# Patient Record
Sex: FEMALE | Race: WHITE | NOT HISPANIC OR LATINO | Employment: UNEMPLOYED | URBAN - NONMETROPOLITAN AREA
[De-identification: names, ages, dates, MRNs, and addresses within clinical notes are randomized per-mention and may not be internally consistent; named-entity substitution may affect disease eponyms.]

---

## 2017-06-14 ENCOUNTER — TRANSCRIBE ORDERS (OUTPATIENT)
Dept: CARDIOLOGY | Facility: CLINIC | Age: 42
End: 2017-06-14

## 2017-06-14 DIAGNOSIS — R55 NEAR SYNCOPE: Primary | ICD-10-CM

## 2017-06-15 ENCOUNTER — CONSULT (OUTPATIENT)
Dept: CARDIOLOGY | Facility: CLINIC | Age: 42
End: 2017-06-15

## 2017-06-15 VITALS
HEIGHT: 64 IN | DIASTOLIC BLOOD PRESSURE: 74 MMHG | BODY MASS INDEX: 27.83 KG/M2 | WEIGHT: 163 LBS | HEART RATE: 100 BPM | SYSTOLIC BLOOD PRESSURE: 102 MMHG

## 2017-06-15 DIAGNOSIS — R00.2 PALPITATIONS: ICD-10-CM

## 2017-06-15 DIAGNOSIS — R00.0 TACHYCARDIA: ICD-10-CM

## 2017-06-15 DIAGNOSIS — R55 NEAR SYNCOPE: Primary | ICD-10-CM

## 2017-06-15 DIAGNOSIS — R42 DIZZINESS: ICD-10-CM

## 2017-06-15 DIAGNOSIS — G43.109 MIGRAINE AURA WITHOUT HEADACHE: ICD-10-CM

## 2017-06-15 DIAGNOSIS — R01.1 MURMUR, CARDIAC: ICD-10-CM

## 2017-06-15 PROCEDURE — 93000 ELECTROCARDIOGRAM COMPLETE: CPT | Performed by: INTERNAL MEDICINE

## 2017-06-15 PROCEDURE — 99244 OFF/OP CNSLTJ NEW/EST MOD 40: CPT | Performed by: INTERNAL MEDICINE

## 2017-06-15 RX ORDER — AMOXICILLIN 500 MG/1
500 CAPSULE ORAL 2 TIMES DAILY
COMMUNITY
End: 2017-07-19

## 2017-06-15 RX ORDER — CYCLOBENZAPRINE HCL 10 MG
20 TABLET ORAL DAILY
COMMUNITY

## 2017-06-15 RX ORDER — TOPIRAMATE 50 MG/1
75 TABLET, FILM COATED ORAL 2 TIMES DAILY
COMMUNITY

## 2017-06-15 RX ORDER — BUTALBITAL, ACETAMINOPHEN AND CAFFEINE 50; 325; 40 MG/1; MG/1; MG/1
1 TABLET ORAL EVERY 4 HOURS PRN
COMMUNITY

## 2017-06-15 NOTE — PROGRESS NOTES
CARDIAC COMPLAINTS  Dizziness and palpitations      Subjective   Dolores Dejesus is a 41 y.o. female came in today for her initial cardiac evaluation.  She has history of significant migraine headache for which she has been on medication for a long time and is now taking Botox injections.  She gets it about every 3 months.  About 2 weeks ago, she was driving with her daughter when she suddenly started hearing ringing noise in both the ears, followed by vision changes there as she started having bright light both sides, and she started feeling dizzy lightheaded and felt like almost going to pass out.  She was holding onto the steering wheel, and in the next few minutes the vision changes but getting better and that ringing noise started getting less.  She started feeling nervous and shaky.  She ate some candies and pulled into a gas station.  She drank some pop after which she started feeling little better.  For the next 2 days, she had multiple episodes of dizziness lasting for a few seconds to minutes.  She was seen at your office, lab work was normal.  Echocardiogram was done as well as a Holter monitor.  Other than slight tachycardia, it was unremarkable.  She did talk with the neurologist and is scheduled to have an EEG done in the next couple weeks.  She is also having a lot of stress, with one of her son who is 2015 years old ,was diagnosed with Kawasaki disease when he was 2 months old. He had stents placed when he was 2 years old.  Had stents recently after an MI and also has an ICD placed.    Past Surgical History:   Procedure Laterality Date   • CONVERTED (HISTORICAL) HOLTER  06/01/2017    @[facility].AVG HR 98 BPM   • ECHO - CONVERTED  06/14/2017    @[facility]. EF 60%. Mild AI       Current Outpatient Prescriptions   Medication Sig Dispense Refill   • amoxicillin (AMOXIL) 500 MG capsule Take 500 mg by mouth 2 (Two) Times a Day.     • butalbital-acetaminophen-caffeine (FIORICET) -40 MG per tablet Take 1 tablet by  "mouth Every 4 (Four) Hours As Needed for Headache.     • cyclobenzaprine (FLEXERIL) 10 MG tablet 2 tablets daily     • topiramate (TOPAMAX) 50 MG tablet 2 tablets a day     • metoprolol tartrate (LOPRESSOR) 25 MG tablet Take 1 tablet by mouth 2 (Two) Times a Day. 60 tablet 11     No current facility-administered medications for this visit.            ALLERGIES:  Carbamates and Carisoprodol    Past Medical History:   Diagnosis Date   • Anxiety    • Asthma    • Chronic kidney disease     hx of kidney stones   • Depression    • History of ear surgery    • Hypoglycemia    • Migraines    • S/P ACL surgery    • S/P removal of ovarian cyst        History   Smoking Status   • Never Smoker   Smokeless Tobacco   • Never Used        Review of Systems   Constitution: Positive for malaise/fatigue. Negative for decreased appetite.   HENT: Positive for tinnitus. Negative for congestion and nosebleeds.    Eyes: Negative for blurred vision and visual disturbance.   Cardiovascular: Positive for palpitations. Negative for chest pain.   Respiratory: Negative for cough.    Endocrine: Negative for cold intolerance.   Hematologic/Lymphatic: Negative for adenopathy.   Skin: Negative for nail changes.   Musculoskeletal: Negative for arthritis and joint pain.   Gastrointestinal: Negative for bloating and dysphagia.   Genitourinary: Negative for bladder incontinence and hesitancy.   Neurological: Positive for dizziness. Negative for excessive daytime sleepiness.   Psychiatric/Behavioral: Negative for altered mental status.       Diabetes- No  Thyroid- normal    Objective     /74 (BP Location: Right arm)  Pulse 100  Ht 64\" (162.6 cm)  Wt 163 lb (73.9 kg)  BMI 27.98 kg/m2    Physical Exam   Constitutional: She appears well-developed.   HENT:   Head: Normocephalic.   Eyes: Pupils are equal, round, and reactive to light.   Neck: Normal range of motion.   Cardiovascular: Normal rate.    Murmur heard.  Pulmonary/Chest: Effort normal. "   Abdominal: Soft. Bowel sounds are normal.   Musculoskeletal: Normal range of motion.   Neurological: She is alert.   Skin: Skin is warm.   Psychiatric: She has a normal mood and affect.         ECG 12 Lead  Date/Time: 6/15/2017 2:45 PM  Performed by: TERESA PENNINGTON  Authorized by: TERESA PENNINGTON   Previous ECG: no previous ECG available  Rhythm: sinus tachycardia  Rate: tachycardic  QRS axis: normal  Clinical impression: abnormal ECG              Assessment/Plan   She appears to be tachycardic.  Blood pressure is normal.  EKG shows sinus tachycardia with no significant changes.  Her clinical examination is unremarkable.  Had a long talk with her about the symptoms.  Not sure whether this is an atypical migraine, or any seizure disorders.  I started her on a low dose of beta blockers to control the heart rate, and hopefully helps with the migraine also.  We will put her extended monitor to see any arrhythmias, since she did not have any symptoms during the Holter monitor.  I also scheduled her to repeat the echocardiogram with bubble study to rule out PFO.  Based on the results of these tests, further recommendations will be made.  Dolores was seen today for establish care and dizziness.    Diagnoses and all orders for this visit:    Near syncope  -     Cardiac Event Monitor; Future    Dizziness  -     Cardiac Event Monitor; Future    Palpitations    Migraine aura without headache  -     Adult Transthoracic Echo Complete With Contrast; Future    Murmur, cardiac    Tachycardia  -     metoprolol tartrate (LOPRESSOR) 25 MG tablet; Take 1 tablet by mouth 2 (Two) Times a Day.  -     Adult Transthoracic Echo Complete With Contrast; Future                    Electronically signed by Teresa Pennington MD Minerva 15, 2017 2:39 PM

## 2017-06-22 ENCOUNTER — OUTSIDE FACILITY SERVICE (OUTPATIENT)
Dept: CARDIOLOGY | Facility: CLINIC | Age: 42
End: 2017-06-22

## 2017-06-22 ENCOUNTER — HOSPITAL ENCOUNTER (OUTPATIENT)
Dept: CARDIOLOGY | Facility: HOSPITAL | Age: 42
Discharge: HOME | End: 2017-06-22
Attending: INTERNAL MEDICINE | Admitting: INTERNAL MEDICINE

## 2017-06-22 DIAGNOSIS — G43.109 MIGRAINE AURA WITHOUT HEADACHE: ICD-10-CM

## 2017-06-22 DIAGNOSIS — R00.0 TACHYCARDIA: ICD-10-CM

## 2017-06-22 PROCEDURE — C8929 TTE W OR WO FOL WCON,DOPPLER: HCPCS

## 2017-06-22 PROCEDURE — 93306 TTE W/DOPPLER COMPLETE: CPT | Performed by: INTERNAL MEDICINE

## 2017-06-22 RX ORDER — SODIUM CHLORIDE 9 MG/ML
8 INJECTION INTRAMUSCULAR; INTRAVENOUS; SUBCUTANEOUS AS NEEDED
Status: DISCONTINUED | OUTPATIENT
Start: 2017-06-22 | End: 2017-06-23 | Stop reason: HOSPADM

## 2017-06-22 RX ADMIN — SODIUM CHLORIDE 8 ML: 9 INJECTION, SOLUTION INTRAMUSCULAR; INTRAVENOUS; SUBCUTANEOUS at 12:04

## 2017-06-30 ENCOUNTER — TELEPHONE (OUTPATIENT)
Dept: CARDIOLOGY | Facility: CLINIC | Age: 42
End: 2017-06-30

## 2017-06-30 DIAGNOSIS — R55 SYNCOPE, UNSPECIFIED SYNCOPE TYPE: Primary | ICD-10-CM

## 2017-07-10 ENCOUNTER — OUTSIDE FACILITY SERVICE (OUTPATIENT)
Dept: CARDIOLOGY | Facility: CLINIC | Age: 42
End: 2017-07-10

## 2017-07-10 PROCEDURE — 93228 REMOTE 30 DAY ECG REV/REPORT: CPT | Performed by: INTERNAL MEDICINE

## 2017-07-19 ENCOUNTER — OFFICE VISIT (OUTPATIENT)
Dept: CARDIOLOGY | Facility: CLINIC | Age: 42
End: 2017-07-19

## 2017-07-19 VITALS
WEIGHT: 162 LBS | SYSTOLIC BLOOD PRESSURE: 110 MMHG | DIASTOLIC BLOOD PRESSURE: 64 MMHG | HEIGHT: 64 IN | HEART RATE: 88 BPM | BODY MASS INDEX: 27.66 KG/M2

## 2017-07-19 DIAGNOSIS — R00.0 TACHYCARDIA: ICD-10-CM

## 2017-07-19 DIAGNOSIS — R55 NEAR SYNCOPE: Primary | ICD-10-CM

## 2017-07-19 DIAGNOSIS — R42 DIZZINESS: ICD-10-CM

## 2017-07-19 DIAGNOSIS — G43.109 MIGRAINE AURA WITHOUT HEADACHE: ICD-10-CM

## 2017-07-19 DIAGNOSIS — E16.2 HYPOGLYCEMIA: ICD-10-CM

## 2017-07-19 PROCEDURE — 99213 OFFICE O/P EST LOW 20 MIN: CPT | Performed by: INTERNAL MEDICINE

## 2017-07-19 RX ORDER — FLUDROCORTISONE ACETATE 0.1 MG/1
0.1 TABLET ORAL DAILY
Qty: 30 TABLET | Refills: 8 | Status: SHIPPED | OUTPATIENT
Start: 2017-07-19 | End: 2018-01-23 | Stop reason: SDUPTHER

## 2017-07-19 NOTE — PROGRESS NOTES
Chief Complaint   Patient presents with   • Follow-up      Still having near syncopal episodes, 1-2 since last here. Pt contributes to her blood sugar dropping.    • Med Refill     No refills needed.        CARDIAC COMPLAINTS  Dizziness and syncope        Subjective   Dolores Dejesus is a 41 y.o. female him in today for her follow-up visit.  She was referred to me for syncopal episodes.  She was found to be little tachycardic.  Low-dose of beta blockers were started.  Her echocardiogram with bubble study showed no evidence of shunt.  Her extended monitor did not show any major arrhythmia.  She did have near syncopal episode while waiting the monitor, but the monitor did not show any arrhythmia during that time.  She was scheduled to undergo a tilt table.  Apparently it was done early in the morning and she is not able to keep the appointment.  She came today stating she had about 2 episode of near syncopal episode but did not pass out.  Still has some dizzy spell.  She thinks it could be due to low sugar.  She doesn't have any diaphoresis prior to that.              Cardiac History  Past Surgical History:   Procedure Laterality Date   • CONVERTED (HISTORICAL) HOLTER  06/01/2017    @[facility].AVG HR 98 BPM   • ECHO - CONVERTED  06/14/2017    @[facility]. EF 60%. Mild AI   • ECHO - CONVERTED  06/22/2017    EF 60%. MIld- Mod AI. No Shunt   • OTHER SURGICAL HISTORY  07/10/2017    Extended Monitor- AVG HR 81 BPM. [place] an Syncope with no EKG changes       Current Outpatient Prescriptions   Medication Sig Dispense Refill   • butalbital-acetaminophen-caffeine (FIORICET) -40 MG per tablet Take 1 tablet by mouth Every 4 (Four) Hours As Needed for Headache.     • cyclobenzaprine (FLEXERIL) 10 MG tablet 2 tablets daily     • metoprolol tartrate (LOPRESSOR) 25 MG tablet Take 1 tablet by mouth 2 (Two) Times a Day. 60 tablet 11   • topiramate (TOPAMAX) 50 MG tablet 2 tablets a day     • fludrocortisone 0.1 MG tablet Take 1 tablet by mouth  Daily. 30 tablet 8     No current facility-administered medications for this visit.        Allergies  :  Carbamates and Carisoprodol       Past Medical History:   Diagnosis Date   • Anxiety    • Asthma    • Chronic kidney disease     hx of kidney stones   • Depression    • History of ear surgery    • Hypoglycemia    • Migraines    • S/P ACL surgery    • S/P removal of ovarian cyst        Social History     Social History   • Marital status:      Spouse name: N/A   • Number of children: N/A   • Years of education: N/A     Occupational History   • Not on file.     Social History Main Topics   • Smoking status: Never Smoker   • Smokeless tobacco: Never Used   • Alcohol use No   • Drug use: No   • Sexual activity: Not on file     Other Topics Concern   • Not on file     Social History Narrative       Family History   Problem Relation Age of Onset   • Diabetes Mother    • Arthritis Father    • Breast cancer Sister    • No Known Problems Sister    • No Known Problems Sister    • Heart disease Son    • Heart attack Son    • Autism Son        Review of Systems   Constitution: Negative for decreased appetite and malaise/fatigue.   HENT: Negative for congestion and sore throat.    Eyes: Negative for blurred vision.   Cardiovascular: Positive for near-syncope. Negative for chest pain and dyspnea on exertion.   Respiratory: Negative for shortness of breath and snoring.    Endocrine: Negative for cold intolerance and heat intolerance.   Hematologic/Lymphatic: Negative for adenopathy. Does not bruise/bleed easily.   Skin: Negative for itching, nail changes and skin cancer.   Musculoskeletal: Negative for arthritis and myalgias.   Gastrointestinal: Negative for abdominal pain, dysphagia and heartburn.   Genitourinary: Negative for bladder incontinence and frequency.   Neurological: Positive for dizziness. Negative for light-headedness, seizures and vertigo.   Psychiatric/Behavioral: Negative for altered mental status.  "  Allergic/Immunologic: Negative for environmental allergies and hives.       Diabetes- No  Thyroid- normal    Objective     /64  Pulse 88  Ht 64\" (162.6 cm)  Wt 162 lb (73.5 kg)  BMI 27.81 kg/m2    Physical Exam   Constitutional: She is oriented to person, place, and time. She appears well-nourished.   HENT:   Head: Normocephalic.   Eyes: Pupils are equal, round, and reactive to light.   Neck: Normal range of motion.   Cardiovascular: Normal rate, regular rhythm, S1 normal and S2 normal.    Murmur heard.  Pulmonary/Chest: Breath sounds normal.   Abdominal: Soft. Bowel sounds are normal.   Musculoskeletal: Normal range of motion.   Neurological: She is alert and oriented to person, place, and time.   Skin: Skin is warm.   Psychiatric: She has a normal mood and affect.     Procedures            Assessment/Plan     Dolores was seen today for follow-up and med refill.    Diagnoses and all orders for this visit:    Near syncope  -     fludrocortisone 0.1 MG tablet; Take 1 tablet by mouth Daily.  -     Comprehensive Metabolic Panel; Future    Tachycardia  -     TSH; Future    Dizziness  -     fludrocortisone 0.1 MG tablet; Take 1 tablet by mouth Daily.  -     CBC & Differential; Future    Migraine aura without headache    Hypoglycemia  -     Hemoglobin A1c; Future     Her heart rate is much better, blood pressure is stable.  I'm going to try to see whether he can do the tilt table at a later time.  Since the symptoms seems to be more often neurocardiac syncope, rather than hypoglycemia, I talked to her about trying Florinef and see whether the symptoms gets better.  If her blood pressure goes up, we can try to increase the dose of the beta blockers.  Also advised her to have lab work to check her hemoglobin, blood sugar, A1c level.                    Electronically signed by Teresa Pennington MD July 19, 2017 4:28 PM      "

## 2017-07-24 DIAGNOSIS — R00.0 TACHYCARDIA: ICD-10-CM

## 2018-01-23 ENCOUNTER — OFFICE VISIT (OUTPATIENT)
Dept: CARDIOLOGY | Facility: CLINIC | Age: 43
End: 2018-01-23

## 2018-01-23 VITALS
HEART RATE: 80 BPM | BODY MASS INDEX: 27.66 KG/M2 | DIASTOLIC BLOOD PRESSURE: 70 MMHG | SYSTOLIC BLOOD PRESSURE: 100 MMHG | HEIGHT: 64 IN | WEIGHT: 162 LBS

## 2018-01-23 DIAGNOSIS — R00.2 PALPITATIONS: ICD-10-CM

## 2018-01-23 DIAGNOSIS — R55 NEAR SYNCOPE: Primary | ICD-10-CM

## 2018-01-23 DIAGNOSIS — R42 DIZZINESS: ICD-10-CM

## 2018-01-23 DIAGNOSIS — Z79.899 MEDICATION MANAGEMENT: ICD-10-CM

## 2018-01-23 DIAGNOSIS — Z86.69 HX OF MIGRAINE HEADACHES: ICD-10-CM

## 2018-01-23 DIAGNOSIS — R00.0 TACHYCARDIA: ICD-10-CM

## 2018-01-23 PROCEDURE — 99213 OFFICE O/P EST LOW 20 MIN: CPT | Performed by: NURSE PRACTITIONER

## 2018-01-23 RX ORDER — FLUDROCORTISONE ACETATE 0.1 MG/1
0.1 TABLET ORAL DAILY
Qty: 90 TABLET | Refills: 3 | Status: SHIPPED | OUTPATIENT
Start: 2018-01-23 | End: 2018-08-13 | Stop reason: SDUPTHER

## 2018-01-23 RX ORDER — SERTRALINE HYDROCHLORIDE 100 MG/1
100 TABLET, FILM COATED ORAL 2 TIMES DAILY
COMMUNITY

## 2018-01-23 RX ORDER — ZOLPIDEM TARTRATE 12.5 MG/1
12.5 TABLET, FILM COATED, EXTENDED RELEASE ORAL NIGHTLY PRN
COMMUNITY
End: 2020-04-24

## 2018-01-23 NOTE — PROGRESS NOTES
"Chief Complaint   Patient presents with   • Follow-up     cardiac management,  no recent labs,    • Med Refill     request 90 day refill's on fludrocortisone. patient brought medication's with visit.    • Palpitations     at times       Subjective       Dolores Dejesus is a 42 y.o. female with a history of syncopal episodes.  She was found to be little tachycardic.  Low-dose of beta blockers were started.  Her echocardiogram with bubble study showed no evidence of shunt.  Her extended monitor did not show any major arrhythmia.  She did have near syncopal episode while wearing the cardiac monitor, but the monitor did not show any arrhythmia during that time.  She was scheduled to undergo a tilt table.  Apparently it was done early in the morning and she was not able to keep the appointment.   Today she comes to the office for a follow up visit. Since her symptoms have improved she did not feel the tilt table test needed to be rescheduled. Overall, she continues to do well and denies reoccurrence of syncope. She maintains active lifestyle. No new medication changes reported. She continues to have \"migraine headaches\", same as prior.     HPI: as noted above         Cardiac History:    Past Surgical History:   Procedure Laterality Date   • CONVERTED (HISTORICAL) HOLTER  06/01/2017    @[facility].AVG HR 98 BPM   • ECHO - CONVERTED  06/14/2017    @[facility]. EF 60%. Mild AI   • ECHO - CONVERTED  06/22/2017    EF 60%. MIld- Mod AI. No Shunt   • OTHER SURGICAL HISTORY  07/10/2017    Extended Monitor- AVG HR 81 BPM. [place] an Syncope with no EKG changes       Current Outpatient Prescriptions   Medication Sig Dispense Refill   • butalbital-acetaminophen-caffeine (FIORICET) -40 MG per tablet Take 1 tablet by mouth Every 4 (Four) Hours As Needed for Headache.     • cyclobenzaprine (FLEXERIL) 10 MG tablet 2 tablets daily     • fludrocortisone 0.1 MG tablet Take 1 tablet by mouth Daily. 90 tablet 3   • metoprolol tartrate (LOPRESSOR) 25 MG " tablet Take 1 tablet by mouth 2 (Two) Times a Day. 180 tablet 4   • sertraline (ZOLOFT) 100 MG tablet Takes 1 and 1/2 tablet's by mouth daily.     • topiramate (TOPAMAX) 50 MG tablet 2 tablets a day     • zolpidem CR (AMBIEN CR) 12.5 MG CR tablet Take 12.5 mg by mouth At Night As Needed for Sleep.       No current facility-administered medications for this visit.        Carbamates and Carisoprodol    Past Medical History:   Diagnosis Date   • Anxiety    • Asthma    • Chronic kidney disease     hx of kidney stones   • Depression    • History of ear surgery    • Hypoglycemia    • Migraines    • S/P ACL surgery    • S/P removal of ovarian cyst        Social History     Social History   • Marital status:      Spouse name: N/A   • Number of children: N/A   • Years of education: N/A     Occupational History   • Not on file.     Social History Main Topics   • Smoking status: Never Smoker   • Smokeless tobacco: Never Used   • Alcohol use No   • Drug use: No   • Sexual activity: Not on file     Other Topics Concern   • Not on file     Social History Narrative       Family History   Problem Relation Age of Onset   • Diabetes Mother    • Arthritis Father    • Breast cancer Sister    • No Known Problems Sister    • No Known Problems Sister    • Heart disease Son    • Heart attack Son    • Autism Son        Review of Systems   Constitution: Negative for decreased appetite, weakness and malaise/fatigue.   HENT: Negative for congestion, hearing loss and hoarse voice.    Eyes: Positive for photophobia (associated with headaches). Negative for visual disturbance.   Cardiovascular: Positive for palpitations (brief, infrequent). Negative for chest pain, dyspnea on exertion, irregular heartbeat, leg swelling and near-syncope.   Respiratory: Negative for shortness of breath and sleep disturbances due to breathing.    Skin: Negative for itching and rash.   Musculoskeletal: Negative for muscle cramps and myalgias.  "  Gastrointestinal: Negative for change in bowel habit, melena and nausea.   Genitourinary: Negative for dysuria and hematuria.   Neurological: Positive for headaches and light-headedness. Negative for tremors.   Psychiatric/Behavioral: Negative for altered mental status. The patient is nervous/anxious. The patient does not have insomnia.         Diabetes- No  Thyroid-normal    Objective     /70 (BP Location: Left arm)  Pulse 80  Ht 162.6 cm (64\")  Wt 73.5 kg (162 lb)  BMI 27.81 kg/m2    Physical Exam   Constitutional: She is oriented to person, place, and time. Vital signs are normal. She appears well-developed and well-nourished. She does not appear ill. No distress.   HENT:   Head: Normocephalic.   Eyes: Conjunctivae are normal. Pupils are equal, round, and reactive to light.   Neck: Normal range of motion. Neck supple. Carotid bruit is not present.   Cardiovascular: Normal rate, regular rhythm, S1 normal and S2 normal.    No murmur heard.  Pulmonary/Chest: Breath sounds normal. She has no wheezes. She has no rales.   Abdominal: Soft. Bowel sounds are normal. There is no tenderness.   Musculoskeletal: Normal range of motion. She exhibits no edema.   Neurological: She is alert and oriented to person, place, and time.   Skin: Skin is warm and dry. No pallor.   Psychiatric: She has a normal mood and affect. Her behavior is normal.      Procedures: none today          Assessment/Plan      Dolores was seen today for follow-up, med refill and palpitations.    Diagnoses and all orders for this visit:    Near syncope  -     fludrocortisone 0.1 MG tablet; Take 1 tablet by mouth Daily.    Tachycardia    Dizziness  -     fludrocortisone 0.1 MG tablet; Take 1 tablet by mouth Daily.    Hx of migraine headaches    Medication management    Palpitations    Headaches, she follows with neurology and manages with medication and rest. Her vital signs today are normal. She denies syncopal episode since last visit. Palpitations " are insignificant. No further cardiac eval recommended at this time. Refills given to continue fludrocortisone. Advised to continue current medications. She avoids caffeine and reports good dietary habits. I complimented healthy choices. We will see her back in 6 months unless symptoms or problems develop sooner. A tilt table test will be considered.               Electronically signed by GIDEON Vallejo,  January 26, 2018 2:03 PM

## 2018-01-26 ENCOUNTER — APPOINTMENT (OUTPATIENT)
Dept: WOMENS IMAGING | Facility: HOSPITAL | Age: 43
End: 2018-01-26

## 2018-01-26 PROBLEM — Z86.69 HX OF MIGRAINE HEADACHES: Status: ACTIVE | Noted: 2018-01-26

## 2018-01-26 PROCEDURE — 77063 BREAST TOMOSYNTHESIS BI: CPT | Performed by: RADIOLOGY

## 2018-01-26 PROCEDURE — 77067 SCR MAMMO BI INCL CAD: CPT | Performed by: RADIOLOGY

## 2018-08-13 ENCOUNTER — OFFICE VISIT (OUTPATIENT)
Dept: CARDIOLOGY | Facility: CLINIC | Age: 43
End: 2018-08-13

## 2018-08-13 VITALS
HEART RATE: 88 BPM | SYSTOLIC BLOOD PRESSURE: 98 MMHG | HEIGHT: 64 IN | BODY MASS INDEX: 28.17 KG/M2 | DIASTOLIC BLOOD PRESSURE: 70 MMHG | WEIGHT: 165 LBS

## 2018-08-13 DIAGNOSIS — R00.0 TACHYCARDIA: ICD-10-CM

## 2018-08-13 DIAGNOSIS — R42 DIZZINESS: ICD-10-CM

## 2018-08-13 DIAGNOSIS — I35.1 NONRHEUMATIC AORTIC VALVE INSUFFICIENCY: ICD-10-CM

## 2018-08-13 DIAGNOSIS — R07.89 CHEST TIGHTNESS: Primary | ICD-10-CM

## 2018-08-13 DIAGNOSIS — R55 NEAR SYNCOPE: ICD-10-CM

## 2018-08-13 PROCEDURE — 99214 OFFICE O/P EST MOD 30 MIN: CPT | Performed by: NURSE PRACTITIONER

## 2018-08-13 RX ORDER — FLUDROCORTISONE ACETATE 0.1 MG/1
0.1 TABLET ORAL DAILY
Qty: 90 TABLET | Refills: 4 | Status: SHIPPED | OUTPATIENT
Start: 2018-08-13 | End: 2018-10-11 | Stop reason: SDUPTHER

## 2018-08-13 NOTE — PATIENT INSTRUCTIONS
Palpitations  A palpitation is the feeling that your heartbeat is irregular or is faster than normal. It may feel like your heart is fluttering or skipping a beat. Palpitations are usually not a serious problem. They may be caused by many things, including smoking, caffeine, alcohol, stress, and certain medicines. Although most causes of palpitations are not serious, palpitations can be a sign of a serious medical problem. In some cases, you may need further medical evaluation.  Follow these instructions at home:  Pay attention to any changes in your symptoms. Take these actions to help with your condition:  · Avoid the following:  ? Caffeinated coffee, tea, soft drinks, diet pills, and energy drinks.  ? Chocolate.  ? Alcohol.  · Do not use any tobacco products, such as cigarettes, chewing tobacco, and e-cigarettes. If you need help quitting, ask your health care provider.  · Try to reduce your stress and anxiety. Things that can help you relax include:  ? Yoga.  ? Meditation.  ? Physical activity, such as swimming, jogging, or walking.  ? Biofeedback. This is a method that helps you learn to use your mind to control things in your body, such as your heartbeats.  · Get plenty of rest and sleep.  · Take over-the-counter and prescription medicines only as told by your health care provider.  · Keep all follow-up visits as told by your health care provider. This is important.    Contact a health care provider if:  · You continue to have a fast or irregular heartbeat after 24 hours.  · Your palpitations occur more often.  Get help right away if:  · You have chest pain or shortness of breath.  · You have a severe headache.  · You feel dizzy or you faint.  This information is not intended to replace advice given to you by your health care provider. Make sure you discuss any questions you have with your health care provider.  Document Released: 12/15/2001 Document Revised: 05/22/2017 Document Reviewed: 09/01/2016  [employer]  [employer] Patient Education © 2018 [employer]

## 2018-08-13 NOTE — PROGRESS NOTES
"Chief Complaint   Patient presents with   • Follow-up     Cardiac management. No recent labs. Patient does not take aspirin.   • Chest Pain     She reports having tightness to mid chest, feels like she can not get breath and will start sweating. She reports having several episodes since last visit.   • Dizziness     She reports has dizziness with episodes of tightness in chest. She reports \"body feels hot\" prior to lightheaded/dizziness.   • Med Refill     Needs refills on cardiac medication-90 day.       Subjective       Dolores Dejesus is a 42 y.o. female with a history of syncopal episodes.  She was found to be little tachycardic.  Low-dose of beta blockers were started.  Her echocardiogram with bubble study showed no evidence of shunt.  Her extended monitor did not show any major arrhythmia.  She did have near syncopal episode while wearing the cardiac monitor, but the monitor did not show any arrhythmia during that time.  She was scheduled to undergo a tilt table.  Apparently it was done early in the morning and she was not able to keep the appointment.  Today she comes to the office for a follow up visit. She underwent hysterectomy earlier this year. She was having symptoms of chest tightness and shortness breath which she thought would improve after surgery. Since that time she reports her symptoms are progressively becoming worse and now accompanied with dizziness and lightheadedness. She is unsure if she has palpitations, but states she just \"feels funny\" in her chest and sometimes her body becomes very \"hot/flused feeling\". A few times she has felt as if she could pass out.     HPI     Cardiac History:    Past Surgical History:   Procedure Laterality Date   • CONVERTED (HISTORICAL) HOLTER  06/01/2017    @[facility].AVG HR 98 BPM   • ECHO - CONVERTED  06/14/2017    @[facility]. EF 60%. Mild AI   • ECHO - CONVERTED  06/22/2017    EF 60%. MIld- Mod AI. No Shunt   • OTHER SURGICAL HISTORY  07/10/2017    Extended Monitor- AVG HR " 81 BPM. [place] an Syncope with no EKG changes       Current Outpatient Prescriptions   Medication Sig Dispense Refill   • butalbital-acetaminophen-caffeine (FIORICET) -40 MG per tablet Take 1 tablet by mouth Every 4 (Four) Hours As Needed for Headache.     • cyclobenzaprine (FLEXERIL) 10 MG tablet 2 tablets daily     • fludrocortisone 0.1 MG tablet Take 1 tablet by mouth Daily. 90 tablet 4   • metoprolol tartrate (LOPRESSOR) 25 MG tablet Take 1 tablet by mouth Every 12 (Twelve) Hours. 180 tablet 4   • OnabotulinumtoxinA (BOTOX IJ) Inject  as directed Every 3 (Three) Months.     • sertraline (ZOLOFT) 100 MG tablet Take 100 mg by mouth 2 (Two) Times a Day.     • topiramate (TOPAMAX) 50 MG tablet 2 tablets a day     • zolpidem CR (AMBIEN CR) 12.5 MG CR tablet Take 12.5 mg by mouth At Night As Needed for Sleep.       No current facility-administered medications for this visit.        Carbamates and Carisoprodol    Past Medical History:   Diagnosis Date   • Anxiety    • Asthma    • Chronic kidney disease     hx of kidney stones   • Depression    • History of ear surgery    • Hx of hysterectomy 06/12/2018   • Hypoglycemia    • Migraines    • S/P ACL surgery    • S/P removal of ovarian cyst        Social History     Social History   • Marital status:      Spouse name: N/A   • Number of children: N/A   • Years of education: N/A     Occupational History   • Not on file.     Social History Main Topics   • Smoking status: Never Smoker   • Smokeless tobacco: Never Used   • Alcohol use Yes      Comment: occasional   • Drug use: No   • Sexual activity: Not on file     Other Topics Concern   • Not on file     Social History Narrative   • No narrative on file       Family History   Problem Relation Age of Onset   • Diabetes Mother    • Arthritis Father    • Breast cancer Sister    • No Known Problems Sister    • No Known Problems Sister    • Heart disease Son    • Heart attack Son    • Autism Son        Review of Systems  "  Constitution: Positive for malaise/fatigue. Negative for decreased appetite.   HENT: Negative for congestion, hoarse voice and nosebleeds.    Eyes: Negative for discharge, redness and visual disturbance.   Cardiovascular: Positive for chest pain (\"tight\"), near-syncope and palpitations. Negative for irregular heartbeat, leg swelling and syncope.   Respiratory: Positive for shortness of breath. Negative for cough and sputum production.    Endocrine: Negative for polydipsia, polyphagia and polyuria.   Hematologic/Lymphatic: Negative for bleeding problem. Does not bruise/bleed easily.   Musculoskeletal: Positive for joint pain and myalgias. Negative for falls.   Gastrointestinal: Negative for abdominal pain, heartburn, melena and nausea.   Genitourinary: Negative for dysuria, hematuria and non-menstrual bleeding.   Neurological: Positive for dizziness, headaches (by history, follows with neurologist) and light-headedness. Negative for loss of balance and tremors.   Psychiatric/Behavioral: Negative for memory loss. The patient has insomnia (at times) and is nervous/anxious (family stress).    Allergic/Immunologic: Negative for hives.        Objective     BP 98/70 (BP Location: Left arm)   Pulse 88   Ht 162.6 cm (64.02\")   Wt 74.8 kg (165 lb)   BMI 28.31 kg/m²     Physical Exam   Constitutional: She is oriented to person, place, and time. Vital signs are normal. She appears well-developed and well-nourished. She does not appear ill. No distress.   HENT:   Head: Normocephalic.   Eyes: Pupils are equal, round, and reactive to light. Conjunctivae are normal.   Neck: Normal range of motion. Neck supple.   Cardiovascular: Normal rate, regular rhythm, S1 normal and S2 normal.    No murmur heard.  Pulmonary/Chest: Breath sounds normal. She has no wheezes. She has no rales.   Abdominal: Soft. Bowel sounds are normal. There is no tenderness.   Musculoskeletal: Normal range of motion. She exhibits no edema.   Neurological: " She is alert and oriented to person, place, and time.   Skin: Skin is warm and dry. No pallor.   Psychiatric: She has a normal mood and affect.      Procedures: none today      Assessment/Plan      Dolores was seen today for follow-up, chest pain, dizziness and med refill.    Diagnoses and all orders for this visit:    Chest tightness  -     Treadmill Stress Test; Future  -     Adult Transthoracic Echo Complete W/ Cont if Necessary Per Protocol; Future    Nonrheumatic aortic valve insufficiency  -     Treadmill Stress Test; Future  -     Adult Transthoracic Echo Complete W/ Cont if Necessary Per Protocol; Future    Dizziness  -     fludrocortisone 0.1 MG tablet; Take 1 tablet by mouth Daily.  -     Treadmill Stress Test; Future  -     Adult Transthoracic Echo Complete W/ Cont if Necessary Per Protocol; Future    Tachycardia  -     metoprolol tartrate (LOPRESSOR) 25 MG tablet; Take 1 tablet by mouth Every 12 (Twelve) Hours.  -     Treadmill Stress Test; Future  -     Adult Transthoracic Echo Complete W/ Cont if Necessary Per Protocol; Future    Near syncope  -     fludrocortisone 0.1 MG tablet; Take 1 tablet by mouth Daily.  -     Treadmill Stress Test; Future  -     Adult Transthoracic Echo Complete W/ Cont if Necessary Per Protocol; Future    Prior to episodes of chest discomfort, dizziness and shortness of breath she admits to sometimes feeling funny heart beats, unsure is irregular. I have asked for treadmill stress test to evaluate for any stress induced arrhythmia as well as EKG changes suggestive of ischemia. Due to history of AI and issues with near syncope, a repeat echocardiogram also advised. We discussed tilt table test, but since she has transportation issues she wants only cardiac testing done locally.     Her vital signs are stable at this time. I advised her to continue same dose of Lopressor and fludrocortisone. Refills given as noted.     For management fo labs she will follow with you.     Patient's  Body mass index is 28.31 kg/m². BMI is above normal parameters. Recommendations include: nutrition counseling. Heart healthy diet and adequate hydration encouraged.      Further recommendations based on test results.            Electronically signed by GIDEON Vallejo,  August 15, 2018 4:05 PM

## 2018-08-21 ENCOUNTER — HOSPITAL ENCOUNTER (OUTPATIENT)
Dept: CARDIOLOGY | Facility: HOSPITAL | Age: 43
Discharge: HOME | End: 2018-08-21
Admitting: NURSE PRACTITIONER

## 2018-08-21 ENCOUNTER — HOSPITAL ENCOUNTER (OUTPATIENT)
Dept: CARDIOLOGY | Facility: HOSPITAL | Age: 43
Discharge: HOME | End: 2018-08-21

## 2018-08-21 DIAGNOSIS — I35.1 NONRHEUMATIC AORTIC VALVE INSUFFICIENCY: ICD-10-CM

## 2018-08-21 DIAGNOSIS — R00.0 TACHYCARDIA: ICD-10-CM

## 2018-08-21 DIAGNOSIS — R42 DIZZINESS: ICD-10-CM

## 2018-08-21 DIAGNOSIS — R07.89 CHEST TIGHTNESS: ICD-10-CM

## 2018-08-21 DIAGNOSIS — R55 NEAR SYNCOPE: ICD-10-CM

## 2018-08-21 LAB
MAXIMAL PREDICTED HEART RATE: 178 BPM
MAXIMAL PREDICTED HEART RATE: 178 BPM
STRESS TARGET HR: 151 BPM
STRESS TARGET HR: 151 BPM

## 2018-08-21 PROCEDURE — 93017 CV STRESS TEST TRACING ONLY: CPT

## 2018-08-21 PROCEDURE — 93306 TTE W/DOPPLER COMPLETE: CPT

## 2018-08-21 PROCEDURE — 93018 CV STRESS TEST I&R ONLY: CPT | Performed by: INTERNAL MEDICINE

## 2018-08-21 PROCEDURE — 93306 TTE W/DOPPLER COMPLETE: CPT | Performed by: INTERNAL MEDICINE

## 2018-08-22 DIAGNOSIS — R55 POSTURAL DIZZINESS WITH PRESYNCOPE: Primary | ICD-10-CM

## 2018-08-22 DIAGNOSIS — R42 POSTURAL DIZZINESS WITH PRESYNCOPE: Primary | ICD-10-CM

## 2018-08-22 NOTE — PROGRESS NOTES
Spoke with patient. She is aware of results. States she is willing to have TTT done. Can you put order in? Order that is in chart is over a year old. Thank you!

## 2018-08-23 ENCOUNTER — TELEPHONE (OUTPATIENT)
Dept: CARDIOLOGY | Facility: CLINIC | Age: 43
End: 2018-08-23

## 2018-08-23 DIAGNOSIS — R07.2 PRECORDIAL PAIN: Primary | ICD-10-CM

## 2018-08-23 NOTE — TELEPHONE ENCOUNTER
Patient aware of regular stress test results and recommendations.    Inconclusive since patient failed to achieve THR.    (6 min. 46 sec. 76%THR)  No significant ST-T changes.  No arrhythmia.  Patient had some chest tightness.    Recommendations, patient may need nuclear stress test.  Patient does report she does still have chest pain and willing to proceed.

## 2018-08-31 ENCOUNTER — WALK IN (OUTPATIENT)
Dept: URGENT CARE | Age: 43
End: 2018-08-31

## 2018-08-31 ENCOUNTER — APPOINTMENT (OUTPATIENT)
Dept: LAB | Age: 43
End: 2018-08-31

## 2018-08-31 VITALS
DIASTOLIC BLOOD PRESSURE: 72 MMHG | WEIGHT: 168.2 LBS | OXYGEN SATURATION: 99 % | HEART RATE: 98 BPM | SYSTOLIC BLOOD PRESSURE: 110 MMHG | RESPIRATION RATE: 12 BRPM | TEMPERATURE: 97.7 F

## 2018-08-31 DIAGNOSIS — N30.01 ACUTE CYSTITIS WITH HEMATURIA: Primary | ICD-10-CM

## 2018-08-31 DIAGNOSIS — J06.9 VIRAL URI: ICD-10-CM

## 2018-08-31 LAB
APPEARANCE UR: ABNORMAL
BACTERIA #/AREA URNS HPF: ABNORMAL /HPF
BILIRUB UR QL STRIP: NEGATIVE
COLOR UR: YELLOW
GLUCOSE UR STRIP-MCNC: NEGATIVE MG/DL
HGB UR QL STRIP: ABNORMAL
HYALINE CASTS #/AREA URNS LPF: ABNORMAL /LPF (ref 0–5)
KETONES UR STRIP-MCNC: NEGATIVE MG/DL
LEUKOCYTE ESTERASE UR QL STRIP: ABNORMAL
NITRITE UR QL STRIP: POSITIVE
PH UR STRIP: 7.5 UNITS (ref 5–7)
PROT UR STRIP-MCNC: NEGATIVE MG/DL
RBC #/AREA URNS HPF: ABNORMAL /HPF (ref 0–3)
SP GR UR STRIP: 1.01 (ref 1–1.03)
SPECIMEN SOURCE: ABNORMAL
SQUAMOUS #/AREA URNS HPF: ABNORMAL /HPF (ref 0–5)
UROBILINOGEN UR STRIP-MCNC: 0.2 MG/DL (ref 0–1)
WBC #/AREA URNS HPF: ABNORMAL /HPF (ref 0–5)

## 2018-08-31 PROCEDURE — 99204 OFFICE O/P NEW MOD 45 MIN: CPT | Performed by: PHYSICIAN ASSISTANT

## 2018-08-31 PROCEDURE — 81001 URINALYSIS AUTO W/SCOPE: CPT | Performed by: INTERNAL MEDICINE

## 2018-08-31 RX ORDER — SULFAMETHOXAZOLE AND TRIMETHOPRIM 800; 160 MG/1; MG/1
1 TABLET ORAL 2 TIMES DAILY
Qty: 6 TABLET | Refills: 0 | Status: SHIPPED | OUTPATIENT
Start: 2018-08-31 | End: 2018-09-03

## 2018-08-31 RX ORDER — FLUTICASONE PROPIONATE 50 MCG
1 SPRAY, SUSPENSION (ML) NASAL 2 TIMES DAILY
Qty: 16 G | Refills: 0 | Status: SHIPPED | OUTPATIENT
Start: 2018-08-31 | End: 2018-09-30

## 2018-08-31 RX ORDER — ONDANSETRON 4 MG/1
4 TABLET, FILM COATED ORAL PRN
COMMUNITY

## 2018-08-31 RX ORDER — CYCLOBENZAPRINE HCL 10 MG
10 TABLET ORAL 2 TIMES DAILY
COMMUNITY

## 2018-08-31 RX ORDER — SERTRALINE HYDROCHLORIDE 100 MG/1
100 TABLET, FILM COATED ORAL 2 TIMES DAILY
COMMUNITY

## 2018-08-31 RX ORDER — ZOLPIDEM TARTRATE 12.5 MG/1
12.5 TABLET, FILM COATED, EXTENDED RELEASE ORAL NIGHTLY
COMMUNITY

## 2018-08-31 ASSESSMENT — ENCOUNTER SYMPTOMS
SORE THROAT: 1
SHORTNESS OF BREATH: 0
ABDOMINAL PAIN: 0
WOUND: 0
NAUSEA: 0
EYE REDNESS: 0
EYE DISCHARGE: 0
HEADACHES: 0
RHINORRHEA: 1
VOMITING: 0
FEVER: 0
COUGH: 1
DIARRHEA: 0

## 2018-09-02 LAB
BACTERIA UR CULT: ABNORMAL
ORGANISM: ABNORMAL
REPORT STATUS (RPT): ABNORMAL
SPECIMEN SOURCE: ABNORMAL

## 2018-09-18 ENCOUNTER — HOSPITAL ENCOUNTER (OUTPATIENT)
Dept: CARDIOLOGY | Facility: HOSPITAL | Age: 43
Discharge: HOME | End: 2018-09-18
Admitting: NURSE PRACTITIONER

## 2018-09-18 DIAGNOSIS — R42 POSTURAL DIZZINESS WITH PRESYNCOPE: ICD-10-CM

## 2018-09-18 DIAGNOSIS — R55 POSTURAL DIZZINESS WITH PRESYNCOPE: ICD-10-CM

## 2018-09-18 LAB — BH CV TILT AGENT USED: NORMAL

## 2018-09-18 PROCEDURE — 93660 TILT TABLE EVALUATION: CPT

## 2018-09-18 PROCEDURE — 93660 TILT TABLE EVALUATION: CPT | Performed by: INTERNAL MEDICINE

## 2018-09-26 ENCOUNTER — TELEPHONE (OUTPATIENT)
Dept: CARDIOLOGY | Facility: CLINIC | Age: 43
End: 2018-09-26

## 2018-09-26 RX ORDER — MIDODRINE HYDROCHLORIDE 2.5 MG/1
2.5 TABLET ORAL 3 TIMES DAILY
Qty: 90 TABLET | Refills: 0 | Status: SHIPPED | OUTPATIENT
Start: 2018-09-26 | End: 2018-10-11 | Stop reason: SDUPTHER

## 2018-09-26 RX ORDER — MIDODRINE HYDROCHLORIDE 2.5 MG/1
2.5 TABLET ORAL 3 TIMES DAILY
Qty: 90 TABLET | Refills: 3 | Status: SHIPPED | OUTPATIENT
Start: 2018-09-26 | End: 2018-09-26 | Stop reason: SDUPTHER

## 2018-09-26 NOTE — TELEPHONE ENCOUNTER
Recent tilt table test positive.     Patient is on beta blocker and SSRI. She has a history of migraines and follows with neurologist. I also had ordered regular stress, no arrhythmia or ischemia but did not reach THR. Echo was unremarkable. Diagnostic for reflex syncope? Consider adding midodrine?

## 2018-09-26 NOTE — TELEPHONE ENCOUNTER
Please advise as noted. I sent in prescription for Midodrine, which should help prevent syncopal episodes. Low dose is 2.5 mg tid. Monitor BP. May benefit in trying 1 to 2 times a day at first then increase dose if needed. Thank you.

## 2018-09-26 NOTE — TELEPHONE ENCOUNTER
Patient aware of Tilt table results and recommendations.  She is aware to start midodrine 2.5 mg.  Advised to start with 1 to 2 times a day and titrate to TID if needed.  Advised to monitor BP and call our office if she has any problems or questions.  Patient requests 30 day script be sent to [facility].  Kenna did sent a script to [facility].

## 2018-10-10 ENCOUNTER — TELEPHONE (OUTPATIENT)
Dept: CARDIOLOGY | Facility: CLINIC | Age: 43
End: 2018-10-10

## 2018-10-10 NOTE — TELEPHONE ENCOUNTER
Patient called asking to schedule appointment with you.  Her follow up is not till February 2019.    She is c/o problems with episodes of fast HR that is lasting hours.  Saturday while cleaning a small RV, she said HR jumps up to around 110 and stayed for 4 hours even with resting assoicated with some SOA.    Since starting the midodrine she her BP is doing better.  She is now taking 2.5 mg TID.    Her last BP readings were   121/90 HR 95  114/82 HR 90  She said her resting HR usually stays around 70-80 BPM.

## 2018-10-11 ENCOUNTER — TELEPHONE (OUTPATIENT)
Dept: CARDIOLOGY | Facility: CLINIC | Age: 43
End: 2018-10-11

## 2018-10-11 ENCOUNTER — OFFICE VISIT (OUTPATIENT)
Dept: CARDIOLOGY | Facility: CLINIC | Age: 43
End: 2018-10-11

## 2018-10-11 VITALS
HEART RATE: 80 BPM | WEIGHT: 170 LBS | HEIGHT: 64 IN | DIASTOLIC BLOOD PRESSURE: 80 MMHG | SYSTOLIC BLOOD PRESSURE: 100 MMHG | BODY MASS INDEX: 29.02 KG/M2

## 2018-10-11 DIAGNOSIS — R00.2 PALPITATIONS: Primary | ICD-10-CM

## 2018-10-11 DIAGNOSIS — R55 NEAR SYNCOPE: ICD-10-CM

## 2018-10-11 DIAGNOSIS — R20.2 NUMBNESS AND TINGLING OF UPPER AND LOWER EXTREMITIES OF BOTH SIDES: ICD-10-CM

## 2018-10-11 DIAGNOSIS — H53.8 BLURRED VISION, BILATERAL: ICD-10-CM

## 2018-10-11 DIAGNOSIS — R42 DIZZINESS: ICD-10-CM

## 2018-10-11 DIAGNOSIS — R06.09 DOE (DYSPNEA ON EXERTION): ICD-10-CM

## 2018-10-11 DIAGNOSIS — R20.0 NUMBNESS AND TINGLING OF UPPER AND LOWER EXTREMITIES OF BOTH SIDES: ICD-10-CM

## 2018-10-11 DIAGNOSIS — E66.3 OVERWEIGHT (BMI 25.0-29.9): ICD-10-CM

## 2018-10-11 PROCEDURE — 99214 OFFICE O/P EST MOD 30 MIN: CPT | Performed by: NURSE PRACTITIONER

## 2018-10-11 RX ORDER — ONDANSETRON 4 MG/1
4 TABLET, FILM COATED ORAL EVERY 8 HOURS PRN
COMMUNITY

## 2018-10-11 RX ORDER — MIDODRINE HYDROCHLORIDE 2.5 MG/1
2.5 TABLET ORAL 3 TIMES DAILY
Qty: 270 TABLET | Refills: 2 | Status: SHIPPED | OUTPATIENT
Start: 2018-10-11 | End: 2018-10-12 | Stop reason: ALTCHOICE

## 2018-10-11 RX ORDER — FLUDROCORTISONE ACETATE 0.1 MG/1
0.1 TABLET ORAL 3 TIMES DAILY
Qty: 270 TABLET | Refills: 3 | Status: SHIPPED | OUTPATIENT
Start: 2018-10-11 | End: 2018-10-11

## 2018-10-11 NOTE — PROGRESS NOTES
Chief Complaint   Patient presents with   • Follow-up     For cardiac management.  see telephone encounter 10/10/18.   • Palpitations     pt is having problems with fast HR while at rest.  Yesterday another episode  while sitting, associated with SOA.   • Dizziness     feels dizzy at time, and vision feels like hard to focus.   • Labs     no recent labs.   • ASA     pt does not take ASA   • Med Refill     if she stays on midodrine will need refill.  [facility]       Subjective       Dolores Dejesus is a 42 y.o. female with a history of syncopal episodes.  She was found to be little tachycardic.  Low-dose of beta blockers were started.  Her echocardiogram with bubble study showed no evidence of shunt.  Her extended monitor did not show any major arrhythmia.  She did have near syncopal episode while wearing the cardiac monitor, but the monitor did not show any arrhythmia during that time.  On 8/21/18, regular stress test and echo done. Stress was inconclusive due to not reaching THR. LVEF was normal and no valvular issues. A tilt table test advised. Initially, she was unable to keep appointment. On 9/20/18, test was completed and reported as positive. Midodrine advised for management of reflex syncope symptoms.     Today she returns to the office concerned over worsening palpitations, dizziness and blurred vision. She continues to have HA and follows with neurologist. When symptoms occur, they make her feel exhausted. With her daily house chores, such as doing laundry and carrying laundry basket, she develops significant shortness of breath.     HPI     Cardiac History:    Past Surgical History:   Procedure Laterality Date   • CARDIOVASCULAR STRESS TEST  08/21/2018    R.Stress- 6 Min. 46 secs. 7.0 METS. 76% THR. BP- 121/73. Pt had CP. Inconclusive test   • CONVERTED (HISTORICAL) HOLTER  06/01/2017    @[email or web address] HR 98 BPM   • ECHO - CONVERTED  06/14/2017    @[facility]. EF 60%. Mild AI   • ECHO - CONVERTED  06/22/2017    EF  60%. MIld- Mod AI. No Shunt   • ECHO - CONVERTED  08/21/2018    EF 55%. Mild AI & MR. RVSP- 26 mmHg.   • OTHER SURGICAL HISTORY  07/10/2017    Extended Monitor- AVG HR 81 BPM. [place] an Syncope with no EKG changes       Current Outpatient Prescriptions   Medication Sig Dispense Refill   • butalbital-acetaminophen-caffeine (FIORICET) -40 MG per tablet Take 1 tablet by mouth Every 4 (Four) Hours As Needed for Headache.     • cyclobenzaprine (FLEXERIL) 10 MG tablet 2 tablets daily     • estradiol (CLIMARA) 0.1 MG/24HR patch Place 1 patch on the skin as directed by provider 2 (Two) Times a Week.     • metoprolol tartrate (LOPRESSOR) 25 MG tablet Take 1 tablet by mouth Every 12 (Twelve) Hours. 180 tablet 4   • midodrine (PROAMATINE) 2.5 MG tablet Take 1 tablet by mouth 3 (Three) Times a Day. 270 tablet 2   • OnabotulinumtoxinA (BOTOX IJ) Inject  as directed Every 3 (Three) Months.     • ondansetron (ZOFRAN) 4 MG tablet Take 4 mg by mouth.     • sertraline (ZOLOFT) 100 MG tablet Take 100 mg by mouth 2 (Two) Times a Day.     • topiramate (TOPAMAX) 50 MG tablet 2 tablets a day     • zolpidem CR (AMBIEN CR) 12.5 MG CR tablet Take 12.5 mg by mouth At Night As Needed for Sleep.       No current facility-administered medications for this visit.        Carbamates and Carisoprodol    Past Medical History:   Diagnosis Date   • Anxiety    • Asthma    • Chronic kidney disease     hx of kidney stones   • Depression    • History of ear surgery    • Hx of hysterectomy 06/12/2018   • Hypoglycemia    • Migraines    • S/P ACL surgery    • S/P removal of ovarian cyst        Social History     Social History   • Marital status:      Spouse name: N/A   • Number of children: N/A   • Years of education: N/A     Occupational History   • Not on file.     Social History Main Topics   • Smoking status: Never Smoker   • Smokeless tobacco: Never Used   • Alcohol use Yes      Comment: occasional   • Drug use: No   • Sexual activity: Not on  "file     Other Topics Concern   • Not on file     Social History Narrative   • No narrative on file       Family History   Problem Relation Age of Onset   • Diabetes Mother    • Arthritis Father    • Breast cancer Sister    • No Known Problems Sister    • No Known Problems Sister    • Heart disease Son    • Heart attack Son    • Autism Son        Review of Systems   Constitution: Positive for malaise/fatigue. Negative for decreased appetite.   Eyes: Positive for blurred vision. Negative for redness.   Cardiovascular: Positive for dyspnea on exertion, near-syncope and palpitations. Negative for leg swelling.   Respiratory: Positive for shortness of breath. Negative for cough.    Endocrine: Negative for polydipsia, polyphagia and polyuria.   Hematologic/Lymphatic: Negative for bleeding problem. Does not bruise/bleed easily.   Skin: Negative for color change and itching.   Musculoskeletal: Negative for joint pain and muscle cramps.   Gastrointestinal: Positive for nausea (sometimes with dizziness). Negative for change in bowel habit, heartburn and melena.   Genitourinary: Negative for dysuria and hematuria.   Neurological: Positive for difficulty with concentration, dizziness and headaches. Negative for tremors.   Psychiatric/Behavioral: Negative for altered mental status. The patient is nervous/anxious.         Objective     /80 (BP Location: Right arm)   Pulse 80   Ht 162.6 cm (64\")   Wt 77.1 kg (170 lb)   BMI 29.18 kg/m²     Physical Exam   Constitutional: She is oriented to person, place, and time. Vital signs are normal. She appears well-developed and well-nourished. She does not have a sickly appearance. No distress.   HENT:   Head: Normocephalic.   Eyes: Pupils are equal, round, and reactive to light. Conjunctivae are normal.   Neck: Normal range of motion. Neck supple. Carotid bruit is not present.   Cardiovascular: Normal rate, regular rhythm, S1 normal and S2 normal.    No murmur heard.  Pulses:     "   Radial pulses are 2+ on the right side, and 2+ on the left side.   Pulmonary/Chest: Breath sounds normal. She has no wheezes. She has no rales.   Abdominal: Soft. Bowel sounds are normal. She exhibits no distension. There is no tenderness.   Musculoskeletal: Normal range of motion. She exhibits no edema.   Neurological: She is alert and oriented to person, place, and time.   Skin: Skin is warm and dry. No pallor.   Psychiatric: She has a normal mood and affect. Her behavior is normal.      Procedures: none today      Assessment/Plan      Dolores was seen today for follow-up, palpitations, dizziness, labs, asa and med refill.    Diagnoses and all orders for this visit:    Palpitations  -     Holter Monitor - 72 Hour Up To 21 Days; Future  -     Stress Test With Myocardial Perfusion One Day; Future    Near syncope  -     Discontinue: fludrocortisone 0.1 MG tablet; Take 1 tablet by mouth 3 (Three) Times a Day.  -     Holter Monitor - 72 Hour Up To 21 Days; Future  -     Stress Test With Myocardial Perfusion One Day; Future    Dizziness  -     Discontinue: fludrocortisone 0.1 MG tablet; Take 1 tablet by mouth 3 (Three) Times a Day.  -     Holter Monitor - 72 Hour Up To 21 Days; Future  -     Stress Test With Myocardial Perfusion One Day; Future    AGUILA (dyspnea on exertion)  -     Holter Monitor - 72 Hour Up To 21 Days; Future  -     Stress Test With Myocardial Perfusion One Day; Future    Numbness and tingling of upper and lower extremities of both sides  -     Holter Monitor - 72 Hour Up To 21 Days; Future  -     Stress Test With Myocardial Perfusion One Day; Future    Blurred vision, bilateral  -     Holter Monitor - 72 Hour Up To 21 Days; Future  -     Stress Test With Myocardial Perfusion One Day; Future    Overweight (BMI 25.0-29.9)    Other orders  -     midodrine (PROAMATINE) 2.5 MG tablet; Take 1 tablet by mouth 3 (Three) Times a Day.    Her recent regular stress treadmill test was inconclusive due to inability  to reach THR. She continues to have persistent symptoms as noted. A nuclear stress test using Lexiscan advised to evaluate for any ischemia.     She has fit bit with heart rate recorded in 120s while sitting eating dinner according to patient. She continues to have episodes of palpitations in form of fast heart beats. A 14 day cardiac monitor ordered to evaluate for arrhythmia. We may consider Loop recorder if inconclusive.     The report of her tilt table test reviewed, appears to have reflex syncope. She will also discuss with her neurologist. At this time, she finds midodrine beneficial. BP stable. Refills given to continue same. She is also on beta blocker. Continue same.     Patient's Body mass index is 29.18 kg/m². BMI is above normal parameters. Recommendations include: nutrition counseling. Heart healthy diet encouraged.      A 6 month follow up visit scheduled. Please call sooner for any cardiac concerns.            Electronically signed by GIDEON Vallejo,  October 11, 2018 1:45 PM

## 2018-10-11 NOTE — TELEPHONE ENCOUNTER
Received [facility] from [facility] reporting Midodrine 2.5mg is unavailable, a possible alternative is Midodrine hcl 5mg (scored tablet), requesting substitue. What is your recommendations? Thanks

## 2018-10-12 RX ORDER — MIDODRINE HYDROCHLORIDE 5 MG/1
TABLET ORAL
Qty: 135 TABLET | Refills: 1 | Status: SHIPPED | OUTPATIENT
Start: 2018-10-12 | End: 2018-12-12

## 2018-10-17 ENCOUNTER — HOSPITAL ENCOUNTER (OUTPATIENT)
Dept: CARDIOLOGY | Facility: HOSPITAL | Age: 43
Discharge: HOME | End: 2018-10-17

## 2018-10-17 DIAGNOSIS — R20.0 NUMBNESS AND TINGLING OF UPPER AND LOWER EXTREMITIES OF BOTH SIDES: ICD-10-CM

## 2018-10-17 DIAGNOSIS — H53.8 BLURRED VISION, BILATERAL: ICD-10-CM

## 2018-10-17 DIAGNOSIS — R06.09 DOE (DYSPNEA ON EXERTION): ICD-10-CM

## 2018-10-17 DIAGNOSIS — R55 NEAR SYNCOPE: ICD-10-CM

## 2018-10-17 DIAGNOSIS — R20.2 NUMBNESS AND TINGLING OF UPPER AND LOWER EXTREMITIES OF BOTH SIDES: ICD-10-CM

## 2018-10-17 DIAGNOSIS — R00.2 PALPITATIONS: ICD-10-CM

## 2018-10-17 DIAGNOSIS — R42 DIZZINESS: ICD-10-CM

## 2018-10-17 LAB
BH CV STRESS COMMENTS STAGE 1: NORMAL
BH CV STRESS DOSE REGADENOSON STAGE 1: 0.4
BH CV STRESS DURATION MIN STAGE 1: 0
BH CV STRESS DURATION SEC STAGE 1: 10
BH CV STRESS PROTOCOL 1: NORMAL
BH CV STRESS RECOVERY BP: NORMAL MMHG
BH CV STRESS RECOVERY HR: 100 BPM
BH CV STRESS STAGE 1: 1
LV EF NUC BP: 74 %
MAXIMAL PREDICTED HEART RATE: 178 BPM
PERCENT MAX PREDICTED HR: 62.36 %
STRESS BASELINE BP: NORMAL MMHG
STRESS BASELINE HR: 83 BPM
STRESS PERCENT HR: 73 %
STRESS POST PEAK BP: NORMAL MMHG
STRESS POST PEAK HR: 111 BPM
STRESS TARGET HR: 151 BPM

## 2018-10-17 PROCEDURE — 93018 CV STRESS TEST I&R ONLY: CPT | Performed by: INTERNAL MEDICINE

## 2018-10-17 PROCEDURE — 0 TECHNETIUM SESTAMIBI: Performed by: INTERNAL MEDICINE

## 2018-10-17 PROCEDURE — 93017 CV STRESS TEST TRACING ONLY: CPT

## 2018-10-17 PROCEDURE — A9500 TC99M SESTAMIBI: HCPCS | Performed by: INTERNAL MEDICINE

## 2018-10-17 PROCEDURE — 78452 HT MUSCLE IMAGE SPECT MULT: CPT

## 2018-10-17 PROCEDURE — 78452 HT MUSCLE IMAGE SPECT MULT: CPT | Performed by: INTERNAL MEDICINE

## 2018-10-17 PROCEDURE — 25010000002 REGADENOSON 0.4 MG/5ML SOLUTION: Performed by: INTERNAL MEDICINE

## 2018-10-17 RX ADMIN — TECHNETIUM TC 99M SESTAMIBI 1 DOSE: 1 INJECTION INTRAVENOUS at 10:03

## 2018-10-17 RX ADMIN — REGADENOSON 0.4 MG: 0.08 INJECTION, SOLUTION INTRAVENOUS at 10:02

## 2018-10-17 RX ADMIN — TECHNETIUM TC 99M SESTAMIBI 1 DOSE: 1 INJECTION INTRAVENOUS at 10:02

## 2018-11-08 ENCOUNTER — OUTSIDE FACILITY SERVICE (OUTPATIENT)
Dept: CARDIOLOGY | Facility: CLINIC | Age: 43
End: 2018-11-08

## 2018-11-08 PROCEDURE — 0298T PR EXT ECG > 48HR TO 21 DAY REVIEW AND INTERPRETATN: CPT | Performed by: INTERNAL MEDICINE

## 2018-11-09 ENCOUNTER — TELEPHONE (OUTPATIENT)
Dept: CARDIOLOGY | Facility: CLINIC | Age: 43
End: 2018-11-09

## 2018-11-09 RX ORDER — METOPROLOL TARTRATE 50 MG/1
50 TABLET, FILM COATED ORAL 2 TIMES DAILY
Qty: 180 TABLET | Refills: 3 | Status: SHIPPED | OUTPATIENT
Start: 2018-11-09 | End: 2018-12-12

## 2018-11-09 NOTE — TELEPHONE ENCOUNTER
----- Message from GIDEON Drew sent at 11/9/2018  8:44 AM EST -----  Please advise to increased metoprolol to 50 mg bid for management of palpitations. Thanks.

## 2018-11-09 NOTE — TELEPHONE ENCOUNTER
See result noted on 14 day extended monitor.  Patient aware of results and recommendations to increase metoprolol to 50 mg BID.  She currently has metoprolol 25 mg tablets.  She is going to take metoprolol 25 mg 2 tablets twice a day.  I sent new script to [facility] with instructions to cancel metoprolol 25 mg script.

## 2018-11-19 ENCOUNTER — TELEPHONE (OUTPATIENT)
Dept: CARDIOLOGY | Facility: CLINIC | Age: 43
End: 2018-11-19

## 2018-11-19 NOTE — TELEPHONE ENCOUNTER
Patient called, she is in [place] for a  and has been there for about a week.  She is c/o bilateral LE edema.  She denies pain in LE.  Advised to elevate LE, drink water and to seek medical attention if she has pain or redness.

## 2018-12-04 ENCOUNTER — TELEPHONE (OUTPATIENT)
Dept: CARDIOLOGY | Facility: CLINIC | Age: 43
End: 2018-12-04

## 2018-12-04 NOTE — TELEPHONE ENCOUNTER
Advised to go to ER secondary to swelling from knees down to feet bilaterally, numbness and tingling in face, hands, arm.  On  she had SOA, weakness to point of wanting to use a wheelchair.  She has not checked BP.  Patient returned from [place] on Saturday from a family member's .

## 2018-12-05 NOTE — TELEPHONE ENCOUNTER
Patient reports she went to a urgent care yesterday.  She said EKG was done and reported as normal.  She reports BP was normal, but does not know exact #'s.  She reports she was advised no testing could be done at the urgent care and was advised to go to ER.  Patient did not go to ER.  She reports she feels some better, but still having numbness in hands, feet, hands.    I called [facility] and requested note and EKG.  Once received, I will forward to Kenna.    Progress note and EKG now in media for review.

## 2018-12-06 NOTE — TELEPHONE ENCOUNTER
Her blood pressure and EKG are normal. A side effect of midodrine is paresthesia. I would advise her to try to wean off the midodrine. She is on fludrocortisone which will help prevent hypotension. Thanks.

## 2018-12-06 NOTE — TELEPHONE ENCOUNTER
I was discussing recommendations to wean off midodrine.  We were discussing her Florinef.  It does not show on her med list here, the 10/11/18 OV shows is was discontinued, but [facility] received a script for Florinef 0.1 mg three times a day.  I just wanted to make sure you knew she has been taking   Florinef 0.1 mg 3 x daily along with midodrine 5 mg 1/2 tab 3 x daily.

## 2018-12-06 NOTE — TELEPHONE ENCOUNTER
I discussed with Dr. Pennington.    He reviewed and advised patient should not be on both Florinef and midodrine.  Dr. Pennington advised to stop Florinef.      Patient aware to stop Florinef.  She request an appointment with Dr. Pennington.  I advised I would get an appt set up for her.

## 2018-12-07 NOTE — TELEPHONE ENCOUNTER
I agree she does not need both. I was going by most common side effects listed for Florinef and Midodrine. Midodrine listed numbness where as Florinef did not. Thank you for update.

## 2018-12-12 ENCOUNTER — OFFICE VISIT (OUTPATIENT)
Dept: CARDIOLOGY | Facility: CLINIC | Age: 43
End: 2018-12-12

## 2018-12-12 VITALS
SYSTOLIC BLOOD PRESSURE: 110 MMHG | BODY MASS INDEX: 28.34 KG/M2 | DIASTOLIC BLOOD PRESSURE: 70 MMHG | WEIGHT: 166 LBS | HEIGHT: 64 IN | HEART RATE: 104 BPM

## 2018-12-12 DIAGNOSIS — E88.81 METABOLIC SYNDROME: ICD-10-CM

## 2018-12-12 DIAGNOSIS — R00.0 TACHYCARDIA: ICD-10-CM

## 2018-12-12 DIAGNOSIS — R42 DIZZINESS: ICD-10-CM

## 2018-12-12 DIAGNOSIS — R55 NEAR SYNCOPE: Primary | ICD-10-CM

## 2018-12-12 DIAGNOSIS — I95.1 ORTHOSTATIC HYPOTENSION: ICD-10-CM

## 2018-12-12 DIAGNOSIS — R00.2 PALPITATIONS: ICD-10-CM

## 2018-12-12 PROBLEM — E88.810 METABOLIC SYNDROME: Status: ACTIVE | Noted: 2018-12-12

## 2018-12-12 PROCEDURE — 99214 OFFICE O/P EST MOD 30 MIN: CPT | Performed by: INTERNAL MEDICINE

## 2018-12-12 PROCEDURE — 93000 ELECTROCARDIOGRAM COMPLETE: CPT | Performed by: INTERNAL MEDICINE

## 2018-12-12 RX ORDER — METOPROLOL TARTRATE 100 MG/1
100 TABLET ORAL 2 TIMES DAILY
Qty: 180 TABLET | Refills: 11 | Status: SHIPPED | OUTPATIENT
Start: 2018-12-12 | End: 2020-04-24

## 2018-12-12 RX ORDER — MIDODRINE HYDROCHLORIDE 5 MG/1
5 TABLET ORAL 3 TIMES DAILY
Qty: 270 TABLET | Refills: 3 | Status: SHIPPED | OUTPATIENT
Start: 2018-12-12 | End: 2019-11-19 | Stop reason: SDUPTHER

## 2018-12-12 RX ORDER — MELOXICAM 15 MG/1
15 TABLET ORAL DAILY
COMMUNITY
End: 2021-12-16

## 2018-12-12 RX ORDER — FLUDROCORTISONE ACETATE 0.1 MG/1
0.1 TABLET ORAL 3 TIMES DAILY
COMMUNITY
End: 2019-03-28 | Stop reason: HOSPADM

## 2018-12-12 RX ORDER — METHOCARBAMOL 750 MG/1
750 TABLET, FILM COATED ORAL EVERY 4 HOURS
COMMUNITY

## 2018-12-12 NOTE — PROGRESS NOTES
"Chief Complaint   Patient presents with   • Follow-up     for cardiac management   • Syncope     has had numerous episodes of near syncope over the past 2-3 weeks, was in [place] for part of the time for her grandmothers .  Starts with chest pressure, SOB,  tingling then starts in her head and then gradually works it's way down her body, will last a few minutes then becomes extremely diaphoretic. Legs just \" go out from under me\" with some episodes  Has numbness in lips, hands and feet all the time. Had no way to check BP during these episodes.     • Fatigue     has been extremely fatigued   • Medication Problem     asking for you to go thru her meds and make sure she is taking correctl, states she has been confused with some of the instructions.  She thought the midodrine said to take 1 1/2 tab 3 times a day on the bottle but has only been taking 1/2 tab 3 times a day, which is what we had down. Confirmed she is still taking both the fludrocortisone and midodrine.  Florinef was stopped on , see telephone encounter.   • Leg Swelling     was increased while in [place], has been much better after getting home.   • Labs     PCP checked labs on Friday       CARDIAC COMPLAINTS  Dizziness, fatigue, lower extremity edema and syncope        Subjective   Dolores Dejesus is a 43 y.o. female came today to be reevaluated.  She was initially referred to me for syncopal episodes and palpitation.  She has undergone multiple investigation including echocardiogram, Holter monitor, stress test and also a Tilt table.  She had normal LV systolic function, mild valvular regurgitation and no ischemia.  The tilt table was positive for hypotension and smears syncopal episode.  She used to be on Florinef which was changed to Midodrine.  Unfortunately, the pharmacist was refill in both of them and she was taking both 3 times a day.  She was in [place] recently has started having more symptoms of dizziness, lightheadedness leg edema and " a bloated feeling.  She called our office about it and then only we realized that she is taking both.  She was advised to stop the Florinef.  She underwent lab work which showed normal renal function and the electrolytes were normal.  Her TSH was within normal limit and so was the B12 and folic acid level.  She still has some occasional palpitation.  She also has significant anxiety.  She is also taking a very high dose of Zoloft.                Cardiac History  Past Surgical History:   Procedure Laterality Date   • CARDIOVASCULAR STRESS TEST  08/21/2018    R.Stress- 6 Min. 46 secs. 7.0 METS. 76% THR. BP- 121/73. Pt had CP. Inconclusive test   • CARDIOVASCULAR STRESS TEST  10/17/2018    L. Cardiolite- Negative.   • CONVERTED (HISTORICAL) HOLTER  06/01/2017    [email or web address] HR 98 BPM   • ECHO - CONVERTED  06/14/2017    @[email or web address]. EF 60%. Mild AI   • ECHO - CONVERTED  06/22/2017    EF 60%. MIld- Mod AI. No Shunt   • ECHO - CONVERTED  08/21/2018    EF 55%. Mild AI & MR. RVSP- 26 mmHg.   • OTHER SURGICAL HISTORY  07/10/2017    Extended Monitor- AVG HR 81 BPM. [place] an Syncope with no EKG changes   • OTHER SURGICAL HISTORY  09/18/2018    Tilt Table- Positive.       Current Outpatient Medications   Medication Sig Dispense Refill   • butalbital-acetaminophen-caffeine (FIORICET) -40 MG per tablet Take 1 tablet by mouth Every 4 (Four) Hours As Needed for Headache.     • cyclobenzaprine (FLEXERIL) 10 MG tablet 2 tablets daily     • estradiol (CLIMARA) 0.1 MG/24HR patch Place 1 patch on the skin as directed by provider 2 (Two) Times a Week.     • fludrocortisone 0.1 MG tablet Take 0.1 mg by mouth 3 (Three) Times a Day.     • meloxicam (MOBIC) 15 MG tablet Take 15 mg by mouth Daily.     • methocarbamol (ROBAXIN) 750 MG tablet Take 750 mg by mouth 3 (Three) Times a Day.     • OnabotulinumtoxinA (BOTOX IJ) Inject  as directed Every 3 (Three) Months.     • ondansetron (ZOFRAN) 4 MG tablet Take 4 mg by mouth Every 8 (Eight) Hours As  Needed.     • sertraline (ZOLOFT) 100 MG tablet Take 100 mg by mouth 2 (Two) Times a Day.     • topiramate (TOPAMAX) 50 MG tablet 1 1/2 tablets twice a day     • zolpidem CR (AMBIEN CR) 12.5 MG CR tablet Take 12.5 mg by mouth At Night As Needed for Sleep.     • metoprolol tartrate (LOPRESSOR) 100 MG tablet Take 1 tablet by mouth 2 (Two) Times a Day. 180 tablet 11   • midodrine (PROAMATINE) 5 MG tablet Take 1 tablet by mouth 3 (Three) Times a Day. 270 tablet 3     No current facility-administered medications for this visit.        Allergies  :  Carbamates and Carisoprodol       Past Medical History:   Diagnosis Date   • Anxiety    • Asthma    • Chronic kidney disease     hx of kidney stones   • Depression    • History of ear surgery    • Hx of hysterectomy 06/12/2018   • Hypoglycemia    • Migraines    • S/P ACL surgery    • S/P removal of ovarian cyst        Social History     Socioeconomic History   • Marital status:      Spouse name: Not on file   • Number of children: Not on file   • Years of education: Not on file   • Highest education level: Not on file   Social Needs   • Financial resource strain: Not on file   • Food insecurity - worry: Not on file   • Food insecurity - inability: Not on file   • Transportation needs - medical: Not on file   • Transportation needs - non-medical: Not on file   Occupational History   • Not on file   Tobacco Use   • Smoking status: Never Smoker   • Smokeless tobacco: Never Used   Substance and Sexual Activity   • Alcohol use: Yes     Comment: occasional   • Drug use: No   • Sexual activity: Not on file   Other Topics Concern   • Not on file   Social History Narrative   • Not on file       Family History   Problem Relation Age of Onset   • Diabetes Mother    • Arthritis Father    • Breast cancer Sister    • No Known Problems Sister    • No Known Problems Sister    • Heart disease Son    • Heart attack Son    • Autism Son        Review of Systems   Constitution: Positive for  "weakness, malaise/fatigue and weight gain. Negative for decreased appetite.   HENT: Negative for congestion and sore throat.    Eyes: Negative for blurred vision.   Cardiovascular: Positive for leg swelling, near-syncope and palpitations. Negative for chest pain.   Respiratory: Negative for shortness of breath and snoring.    Endocrine: Negative for cold intolerance and heat intolerance.   Hematologic/Lymphatic: Negative for adenopathy. Does not bruise/bleed easily.   Skin: Negative for itching, nail changes and skin cancer.   Musculoskeletal: Negative for arthritis and myalgias.   Gastrointestinal: Negative for abdominal pain, dysphagia and heartburn.   Genitourinary: Negative for bladder incontinence and frequency.   Neurological: Negative for dizziness, light-headedness, seizures and vertigo.   Psychiatric/Behavioral: Negative for altered mental status.   Allergic/Immunologic: Negative for environmental allergies and hives.       Diabetes- No  Thyroid- normal    Objective     /70   Pulse 104   Ht 162.6 cm (64\")   Wt 75.3 kg (166 lb)   BMI 28.49 kg/m²     Physical Exam   Constitutional: She is oriented to person, place, and time. She appears well-developed and well-nourished.   HENT:   Head: Normocephalic.   Nose: Nose normal.   Eyes: EOM are normal. Pupils are equal, round, and reactive to light.   Neck: Normal range of motion. Neck supple.   Cardiovascular: Regular rhythm, S1 normal and S2 normal. Tachycardia present.   Murmur heard.  Pulmonary/Chest: Effort normal and breath sounds normal.   Abdominal: Soft. Bowel sounds are normal.   Musculoskeletal: Normal range of motion.   Neurological: She is alert and oriented to person, place, and time.   Skin: Skin is warm and dry.   Psychiatric: She has a normal mood and affect.       ECG 12 Lead  Date/Time: 12/12/2018 3:15 PM  Performed by: Teresa Pennington MD  Authorized by: Teresa Pennington MD   Comparison: compared with previous ECG from " 12/4/2018  Comparison to previous ECG: Rate has increased  Rhythm: sinus tachycardia  Rate: tachycardic  QRS axis: normal  Clinical impression: abnormal ECG                    Assessment/Plan   Patient's Body mass index is 28.49 kg/m². BMI is above normal parameters. Recommendations include: educational material, exercise counseling and nutrition counseling.     Dolores was seen today for follow-up, syncope, fatigue, medication problem, leg swelling and labs.    Diagnoses and all orders for this visit:    Near syncope    Tachycardia  -     metoprolol tartrate (LOPRESSOR) 100 MG tablet; Take 1 tablet by mouth 2 (Two) Times a Day.    Dizziness    Orthostatic hypotension  -     midodrine (PROAMATINE) 5 MG tablet; Take 1 tablet by mouth 3 (Three) Times a Day.    Metabolic syndrome    Palpitations  -     metoprolol tartrate (LOPRESSOR) 100 MG tablet; Take 1 tablet by mouth 2 (Two) Times a Day.       At baseline she is tachycardic with normal blood pressure.  Her BMI is around 29.  Her EKG showed sinus tachycardia.  Her clinical examination is within normal limit.  I had a very long talk with her about taking Florinef and Midodrine at the same time.  Explained to her the dangers associated with it.  At this time, I increased her dose of Lopressor to 100 mg twice a day.  I increased her dose of Midodrine to 5 mg twice a day.  If she continues to have the postural hypotension, then she needs to consider Northera.  Need to get it preauthorized by her insurance.  I also had a long talk with her about the metabolic syndrome.  I gave her papers on Mediterranean diet.  I explained to her about the postural hypotension in detail.  I reviewed her labs with her.  I'll see her back in few months or sooner if needed.                  Electronically signed by Teresa Pennington MD December 12, 2018 3:05 PM

## 2018-12-12 NOTE — PATIENT INSTRUCTIONS
Mediterranean Diet  A Mediterranean diet refers to food and lifestyle choices that are based on the traditions of countries located on the [place]. This way of eating has been shown to help prevent certain conditions and improve outcomes for people who have chronic diseases, like kidney disease and heart disease.  What are tips for following this plan?  Lifestyle  · Cook and eat meals together with your family, when possible.  · Drink enough fluid to keep your urine clear or pale yellow.  · Be physically active every day. This includes:  ? Aerobic exercise like running or swimming.  ? Leisure activities like gardening, walking, or housework.  · Get 7-8 hours of sleep each night.  · If recommended by your health care provider, drink red wine in moderation. This means 1 glass a day for nonpregnant women and 2 glasses a day for men. A glass of wine equals 5 oz (150 mL).  Reading food labels  · Check the serving size of packaged foods. For foods such as rice and pasta, the serving size refers to the amount of cooked product, not dry.  · Check the total fat in packaged foods. Avoid foods that have saturated fat or trans fats.  · Check the ingredients list for added sugars, such as corn syrup.  Shopping  · At the grocery store, buy most of your food from the areas near the walls of the store. This includes:  ? Fresh fruits and vegetables (produce).  ? Grains, beans, nuts, and seeds. Some of these may be available in unpackaged forms or large amounts (in bulk).  ? Fresh seafood.  ? Poultry and eggs.  ? Low-fat dairy products.  · Buy whole ingredients instead of prepackaged foods.  · Buy fresh fruits and vegetables in-season from local farmers markets.  · Buy frozen fruits and vegetables in resealable bags.  · If you do not have access to quality fresh seafood, buy precooked frozen shrimp or canned fish, such as tuna, salmon, or sardines.  · Buy small amounts of raw or cooked vegetables, salads, or olives from the  deli or salad bar at your store.  · Stock your pantry so you always have certain foods on hand, such as olive oil, canned tuna, canned tomatoes, rice, pasta, and beans.  Cooking  · Cook foods with extra-virgin olive oil instead of using butter or other vegetable oils.  · Have meat as a side dish, and have vegetables or grains as your main dish. This means having meat in small portions or adding small amounts of meat to foods like pasta or stew.  · Use beans or vegetables instead of meat in common dishes like chili or lasagna.  · [place] with different cooking methods. Try roasting or broiling vegetables instead of steaming or sautéeing them.  · Add frozen vegetables to soups, stews, pasta, or rice.  · Add nuts or seeds for added healthy fat at each meal. You can add these to yogurt, salads, or vegetable dishes.  · Marinate fish or vegetables using olive oil, lemon juice, garlic, and fresh herbs.  Meal planning  · Plan to eat 1 vegetarian meal one day each week. Try to work up to 2 vegetarian meals, if possible.  · Eat seafood 2 or more times a week.  · Have healthy snacks readily available, such as:  ? Vegetable sticks with hummus.  ? Greek yogurt.  ? Fruit and nut trail mix.  · Eat balanced meals throughout the week. This includes:  ? Fruit: 2-3 servings a day  ? Vegetables: 4-5 servings a day  ? Low-fat dairy: 2 servings a day  ? Fish, poultry, or lean meat: 1 serving a day  ? Beans and legumes: 2 or more servings a week  ? Nuts and seeds: 1-2 servings a day  ? Whole grains: 6-8 servings a day  ? Extra-virgin olive oil: 3-4 servings a day  · Limit red meat and sweets to only a few servings a month  What are my food choices?  · Mediterranean diet  ? Recommended  ? Grains: Whole-grain pasta. Brown rice. Bulgar wheat. Polenta. Couscous. Whole-wheat bread. Oatmeal. Quinoa.  ? Vegetables: Artichokes. Beets. Broccoli. Cabbage. Carrots. Eggplant. Green beans. Chard. Kale. Spinach. Onions. Leeks. Peas. Squash.  Tomatoes. Peppers. Radishes.  ? Fruits: Apples. Apricots. Avocado. Berries. Bananas. Cherries. Dates. Figs. Grapes. Magda. Melon. Oranges. Peaches. Plums. Pomegranate.  ? Meats and other protein foods: Beans. Almonds. Sunflower seeds. Pine nuts. Peanuts. Cod. [place]. Scallops. Shrimp. Tuna. Tilapia. Clams. Oysters. Eggs.  ? Dairy: Low-fat milk. Cheese. Greek yogurt.  ? Beverages: Water. Red wine. Herbal tea.  ? Fats and oils: Extra virgin olive oil. Avocado oil. Grape seed oil.  ? Sweets and desserts: Greek yogurt with honey. Baked apples. Poached pears. Trail mix.  ? Seasoning and other foods: Basil. Cilantro. Coriander. Cumin. Mint. Parsley. Humberto. Rosemary. Tarragon. Garlic. Oregano. Thyme. Pepper. Balsalmic vinegar. Tahini. Hummus. Tomato sauce. Olives. Mushrooms.  ? Limit these  ? Grains: Prepackaged pasta or rice dishes. Prepackaged cereal with added sugar.  ? Vegetables: Deep fried potatoes (french fries).  ? Fruits: Fruit canned in syrup.  ? Meats and other protein foods: Beef. Pork. Lamb. Poultry with skin. Hot dogs. Bansal.  ? Dairy: Ice cream. Sour cream. Whole milk.  ? Beverages: Juice. Sugar-sweetened soft drinks. Beer. Liquor and spirits.  ? Fats and oils: Butter. Canola oil. Vegetable oil. Beef fat (tallow). Lard.  ? Sweets and desserts: Cookies. Cakes. Pies. Candy.  ? Seasoning and other foods: Mayonnaise. Premade sauces and marinades.  ? The items listed may not be a complete list. Talk with your dietitian about what dietary choices are right for you.  Summary  · The Mediterranean diet includes both food and lifestyle choices.  · Eat a variety of fresh fruits and vegetables, beans, nuts, seeds, and whole grains.  · Limit the amount of red meat and sweets that you eat.  · Talk with your health care provider about whether it is safe for you to drink red wine in moderation. This means 1 glass a day for nonpregnant women and 2 glasses a day for men. A glass of wine equals 5 oz (150 mL).  This information  is not intended to replace advice given to you by your health care provider. Make sure you discuss any questions you have with your health care provider.  Document Released: 08/10/2017 Document Revised: 09/12/2017 Document Reviewed: 08/10/2017  [employer] Interactive Patient Education © 2018 [employer]

## 2019-03-28 ENCOUNTER — OFFICE VISIT (OUTPATIENT)
Dept: CARDIOLOGY | Facility: CLINIC | Age: 44
End: 2019-03-28

## 2019-03-28 ENCOUNTER — APPOINTMENT (OUTPATIENT)
Dept: WOMENS IMAGING | Facility: HOSPITAL | Age: 44
End: 2019-03-28

## 2019-03-28 VITALS
DIASTOLIC BLOOD PRESSURE: 78 MMHG | HEART RATE: 76 BPM | WEIGHT: 171 LBS | BODY MASS INDEX: 29.19 KG/M2 | SYSTOLIC BLOOD PRESSURE: 110 MMHG | HEIGHT: 64 IN

## 2019-03-28 DIAGNOSIS — R00.2 PALPITATIONS: Primary | ICD-10-CM

## 2019-03-28 DIAGNOSIS — G90.A POTS (POSTURAL ORTHOSTATIC TACHYCARDIA SYNDROME): ICD-10-CM

## 2019-03-28 DIAGNOSIS — R00.0 TACHYCARDIA: ICD-10-CM

## 2019-03-28 DIAGNOSIS — E66.3 OVERWEIGHT: ICD-10-CM

## 2019-03-28 DIAGNOSIS — R42 DIZZINESS: ICD-10-CM

## 2019-03-28 DIAGNOSIS — I95.1 ORTHOSTATIC HYPOTENSION: ICD-10-CM

## 2019-03-28 PROCEDURE — 77067 SCR MAMMO BI INCL CAD: CPT | Performed by: RADIOLOGY

## 2019-03-28 PROCEDURE — 77063 BREAST TOMOSYNTHESIS BI: CPT | Performed by: RADIOLOGY

## 2019-03-28 PROCEDURE — 99214 OFFICE O/P EST MOD 30 MIN: CPT | Performed by: NURSE PRACTITIONER

## 2019-03-28 NOTE — PROGRESS NOTES
Chief Complaint   Patient presents with   • Follow-up     For cardiac management. Patient is not on aspirin. Reports that she has been having more of the dizzy spells. Reports having a fast heart rate and then goes down fast, reports that she gets hot and starts sweating. Last lab work was done on 12/07/18 per PCP, in chart under media.    • Med Refill     Did not bring medication list.        Cardiac Complaints  Dizziness and palpitations      Subjective   Dolores Dejesus is a 43 y.o. female with anxiety, migraines, palpitations, and syncope.  She was initially referred in 2017 for syncope and palpitations.   She underwent multiple investigations including echocardiogram, Holter monitor, stress test, and a Tilt table.  She had normal LV systolic function, mild valvular regurgitation and no ischemia.  The tilt table was positive for hypotension and near syncopal episode.  She used to be on Florinef which was changed to Midodrine.  Unfortunately, the pharmacist was refilled both of them and she was taking both 3 times a day.  She then went to [place] and started having more symptoms of dizziness, lightheadedness, leg edema, and a bloated feeling.  She called the office and  was advised to stop the Florinef. She returns today for follow up and does report some dizzy spells and states they are becoming more and more frequent.  She does report checking her blood pressure at home but only in a sitting position, with them she states they have been normal.  She denies any orthostatic blood pressure checks at home.  She does admit to issues with palpitations that she describes as a tachy/brachy syndrome.  She admits to fast heart rate that will slow down and feel like it pauses.  She states when these events happen, she will become diaphoretic.  She also reports just an overall weakness and states menial tasks like lifting her arms up make her take a 2 hour nap.  Most recent labs available from PCP in December show:   H/H  13.3/39.8, BUN 8, Na 143, K 3.5, B12 434, TSH 0.647, ALT 13, and AST 16.          Cardiac History  Past Surgical History:   Procedure Laterality Date   • CARDIOVASCULAR STRESS TEST  08/21/2018    R.Stress- 6 Min. 46 secs. 7.0 METS. 76% THR. BP- 121/73. Pt had CP. Inconclusive test   • CARDIOVASCULAR STRESS TEST  10/17/2018    L. Cardiolite- Negative.   • CONVERTED (HISTORICAL) HOLTER  06/01/2017    @[email or web address] HR 98 BPM   • ECHO - CONVERTED  06/14/2017    @[facility]. EF 60%. Mild AI   • ECHO - CONVERTED  06/22/2017    EF 60%. MIld- Mod AI. No Shunt   • ECHO - CONVERTED  08/21/2018    EF 55%. Mild AI & MR. RVSP- 26 mmHg.   • OTHER SURGICAL HISTORY  07/10/2017    Extended Monitor- AVG HR 81 BPM. [place] an Syncope with no EKG changes   • OTHER SURGICAL HISTORY  09/18/2018    Tilt Table- Positive.       Current Outpatient Medications   Medication Sig Dispense Refill   • butalbital-acetaminophen-caffeine (FIORICET) -40 MG per tablet Take 1 tablet by mouth Every 4 (Four) Hours As Needed for Headache.     • cyclobenzaprine (FLEXERIL) 10 MG tablet 2 tablets daily     • estradiol (CLIMARA) 0.1 MG/24HR patch Place 1 patch on the skin as directed by provider 2 (Two) Times a Week.     • meloxicam (MOBIC) 15 MG tablet Take 15 mg by mouth Daily.     • methocarbamol (ROBAXIN) 750 MG tablet Take 750 mg by mouth 3 (Three) Times a Day.     • metoprolol tartrate (LOPRESSOR) 100 MG tablet Take 1 tablet by mouth 2 (Two) Times a Day. 180 tablet 11   • midodrine (PROAMATINE) 5 MG tablet Take 1 tablet by mouth 3 (Three) Times a Day. 270 tablet 3   • OnabotulinumtoxinA (BOTOX IJ) Inject  as directed Every 3 (Three) Months.     • ondansetron (ZOFRAN) 4 MG tablet Take 4 mg by mouth Every 8 (Eight) Hours As Needed.     • sertraline (ZOLOFT) 100 MG tablet Take 100 mg by mouth 2 (Two) Times a Day.     • topiramate (TOPAMAX) 50 MG tablet 1 1/2 tablets twice a day     • zolpidem CR (AMBIEN CR) 12.5 MG CR tablet Take 12.5 mg by mouth At Night As Needed  "for Sleep.       No current facility-administered medications for this visit.        Carbamates and Carisoprodol    Past Medical History:   Diagnosis Date   • Anxiety    • Asthma    • Chronic kidney disease     hx of kidney stones   • Depression    • History of ear surgery    • Hx of hysterectomy 06/12/2018   • Hypoglycemia    • Migraines    • S/P ACL surgery    • S/P removal of ovarian cyst        Social History     Socioeconomic History   • Marital status:      Spouse name: Not on file   • Number of children: Not on file   • Years of education: Not on file   • Highest education level: Not on file   Tobacco Use   • Smoking status: Never Smoker   • Smokeless tobacco: Never Used   Substance and Sexual Activity   • Alcohol use: Yes     Comment: occasional   • Drug use: No       Family History   Problem Relation Age of Onset   • Diabetes Mother    • Arthritis Father    • Breast cancer Sister    • No Known Problems Sister    • No Known Problems Sister    • Heart disease Son    • Heart attack Son    • Autism Son        Review of Systems   Constitution: Positive for malaise/fatigue. Negative for weakness.   Cardiovascular: Positive for irregular heartbeat, near-syncope and palpitations. Negative for chest pain, claudication, dyspnea on exertion, leg swelling, orthopnea and syncope.   Respiratory: Negative for shortness of breath and wheezing.    Musculoskeletal: Negative for back pain, joint pain and joint swelling.   Gastrointestinal: Negative for anorexia, heartburn, nausea and vomiting.   Genitourinary: Negative for dysuria, hematuria, hesitancy and nocturia.   Neurological: Positive for dizziness, light-headedness and loss of balance.   Psychiatric/Behavioral: Negative for depression. The patient is nervous/anxious.            Objective     /78 (BP Location: Left arm)   Pulse 76   Ht 162.6 cm (64.02\")   Wt 77.6 kg (171 lb)   BMI 29.34 kg/m²     Physical Exam   Constitutional: She is oriented to " person, place, and time. She appears well-developed and well-nourished.   HENT:   Head: Normocephalic and atraumatic.   Eyes: EOM are normal. Pupils are equal, round, and reactive to light.   Neck: Normal range of motion. Neck supple.   Cardiovascular: Normal rate and regular rhythm.   Murmur heard.  Pulmonary/Chest: Effort normal and breath sounds normal.   Abdominal: Soft.   Musculoskeletal: Normal range of motion.   Neurological: She is alert and oriented to person, place, and time.   Skin: Skin is warm and dry.   Psychiatric: She has a normal mood and affect. Her behavior is normal.       Procedures    Assessment/Plan     HR is stable and regular.  Patient does admit to worsening palpitations,tachycardia, and dizziness.  Since holter did not show any arrythmia, she will be advised to wear an MCOT monitor for 30 days to assess for any tachyarrythmias/pausing that may be causing symptoms.  More recommendations to follow. For now, she will continue on current beta blocker therapy. Blood pressure is well managed today but patient does have a history of POTS as was found on tilt table.  She does use her midodrine for hypotension.  She has not checked any postural blood pressures and was advised to do so at home so any orthostatic changes can be assessed. If noted, she will be urged to discontinue the midodrine and begin northera therapy.  Repeat syncopal episodes are denied but she does admit to dizziness and pre-syncope. Order for carotid US provided to assess for any carotid stenosis. [place] the [place] and no longer taking both florinef and midodrine, patient reports edema has been well managed.  Labs were done with your office in December and copy available to our office showed no significant abnormalities.  Thank you for the copy.  No refills currently needed. BMI noted at 29.34, patient urged to limit her caloric intake and get adequate exercise with walking at least 3-4 times weekly for 30 minutes each  time recommended. Adequate fluid intake advised. 6 month follow up scheduled or sooner if needed as well as limited caffeine.        Problems Addressed this Visit        Cardiovascular and Mediastinum    Tachycardia    Orthostatic hypotension    Palpitations - Primary    Relevant Orders    Mobile Cardiac Outpatient Telemetry       Other    Dizziness    Relevant Orders    Mobile Cardiac Outpatient Telemetry    US Carotid Bilateral      Other Visit Diagnoses     POTS (postural orthostatic tachycardia syndrome)        Overweight              Patient's Body mass index is 29.34 kg/m². BMI is above normal parameters. Recommendations include: nutrition counseling.            Electronically signed by GIDEON Pete March 29, 2019 1:04 PM

## 2019-04-01 ENCOUNTER — HOSPITAL ENCOUNTER (OUTPATIENT)
Dept: CARDIOLOGY | Facility: HOSPITAL | Age: 44
Discharge: HOME | End: 2019-04-01
Admitting: NURSE PRACTITIONER

## 2019-04-01 DIAGNOSIS — R42 DIZZINESS: ICD-10-CM

## 2019-04-01 PROCEDURE — 93880 EXTRACRANIAL BILAT STUDY: CPT | Performed by: RADIOLOGY

## 2019-04-01 PROCEDURE — 93880 EXTRACRANIAL BILAT STUDY: CPT

## 2019-04-18 ENCOUNTER — TELEPHONE (OUTPATIENT)
Dept: CARDIOLOGY | Facility: CLINIC | Age: 44
End: 2019-04-18

## 2019-04-18 NOTE — TELEPHONE ENCOUNTER
Patient states that she went to see psychiatrist yesterday who prescribed Xanax to be taken as needed. She was told not to take until she had made you aware and got the ok to take.

## 2019-04-18 NOTE — TELEPHONE ENCOUNTER
Patient was made aware that it would be ok to take xanax at low dose. Patient states that dose is 0.5 mg.

## 2019-05-09 ENCOUNTER — OUTSIDE FACILITY SERVICE (OUTPATIENT)
Dept: CARDIOLOGY | Facility: CLINIC | Age: 44
End: 2019-05-09

## 2019-05-09 PROCEDURE — 93228 REMOTE 30 DAY ECG REV/REPORT: CPT | Performed by: INTERNAL MEDICINE

## 2019-05-10 ENCOUNTER — TELEPHONE (OUTPATIENT)
Dept: CARDIOLOGY | Facility: CLINIC | Age: 44
End: 2019-05-10

## 2019-05-10 NOTE — TELEPHONE ENCOUNTER
"Called and spoke with patient about addition of Metoprolol 25 mg PRN with increased palpitations . She states \" so I'm just supposed to  just medicate the problem \" I explained to her the extra PRN dose of medication could help with her palpitations . Patient did not continue conversation , she hang up the phone at this time. Prescription sent to [facility] .  "

## 2019-05-10 NOTE — TELEPHONE ENCOUNTER
Please let her know if that is what she is doing continue on the 100mg BID, for some reason, I thought we had decreased the metoprolol therapy. Also, please advise to have adequate fluid intake as well as limiting her caffeine.  She may have an additional 25mg to use as needed on days palpitations are more bothersome.

## 2019-05-14 ENCOUNTER — APPOINTMENT (OUTPATIENT)
Dept: WOMENS IMAGING | Facility: HOSPITAL | Age: 44
End: 2019-05-14

## 2019-05-14 PROCEDURE — 76641 ULTRASOUND BREAST COMPLETE: CPT | Performed by: RADIOLOGY

## 2019-05-14 PROCEDURE — 77061 BREAST TOMOSYNTHESIS UNI: CPT | Performed by: RADIOLOGY

## 2019-05-14 PROCEDURE — 77065 DX MAMMO INCL CAD UNI: CPT | Performed by: RADIOLOGY

## 2019-05-17 ENCOUNTER — TELEPHONE (OUTPATIENT)
Dept: CARDIOLOGY | Facility: CLINIC | Age: 44
End: 2019-05-17

## 2019-05-17 NOTE — TELEPHONE ENCOUNTER
Per verbal order from GIDEON San, patient is to discontinue metoprolol 25 mg PRN and to add diltiazem 30 mg PRN for palpitations/tachycardia up to 2 doses a day.     Patient verbalized understanding.     Script sent to [facility] with 180 tablets and 1 refill.

## 2019-05-17 NOTE — TELEPHONE ENCOUNTER
Patient called and states that she has not been feeling well this week. Patient states that she took her blood pressure on Wednesday and it was 138/90 and HR was 110 and that was after having regular morning dose of metoprolol 100 mg and a PRN dose of the 25 mg. She states that her heart rate stayed between 97 and 146. She states that she was trying to work in her garden and she started feeling really bad, got dizzy and had to go inside and lay down. Patient would like to know what you recommend?    Patient also wants to know how many doses of the metprolol 25 mg PRN can she take a day?

## 2019-09-30 ENCOUNTER — OFFICE VISIT (OUTPATIENT)
Dept: CARDIOLOGY | Facility: CLINIC | Age: 44
End: 2019-09-30

## 2019-09-30 VITALS
HEART RATE: 80 BPM | DIASTOLIC BLOOD PRESSURE: 80 MMHG | WEIGHT: 177 LBS | BODY MASS INDEX: 30.22 KG/M2 | HEIGHT: 64 IN | SYSTOLIC BLOOD PRESSURE: 120 MMHG

## 2019-09-30 DIAGNOSIS — R42 DIZZINESS: Primary | ICD-10-CM

## 2019-09-30 DIAGNOSIS — G90.A POTS (POSTURAL ORTHOSTATIC TACHYCARDIA SYNDROME): ICD-10-CM

## 2019-09-30 DIAGNOSIS — H91.8X1 OTHER SPECIFIED HEARING LOSS OF RIGHT EAR, UNSPECIFIED HEARING STATUS ON CONTRALATERAL SIDE: ICD-10-CM

## 2019-09-30 DIAGNOSIS — R00.0 TACHYCARDIA: ICD-10-CM

## 2019-09-30 DIAGNOSIS — R00.2 PALPITATIONS: ICD-10-CM

## 2019-09-30 DIAGNOSIS — Z86.69 HX OF MIGRAINE HEADACHES: ICD-10-CM

## 2019-09-30 DIAGNOSIS — F41.9 ANXIETY: ICD-10-CM

## 2019-09-30 DIAGNOSIS — E66.9 OBESITY (BMI 30.0-34.9): ICD-10-CM

## 2019-09-30 DIAGNOSIS — R51.9 HEADACHE DISORDER: ICD-10-CM

## 2019-09-30 DIAGNOSIS — H54.61 VISION LOSS OF RIGHT EYE: ICD-10-CM

## 2019-09-30 DIAGNOSIS — R55 NEAR SYNCOPE: ICD-10-CM

## 2019-09-30 PROCEDURE — 99213 OFFICE O/P EST LOW 20 MIN: CPT | Performed by: NURSE PRACTITIONER

## 2019-09-30 RX ORDER — ALPRAZOLAM 0.5 MG/1
0.5 TABLET ORAL DAILY
COMMUNITY
End: 2020-04-24

## 2019-09-30 NOTE — PROGRESS NOTES
Chief Complaint   Patient presents with   • Follow-up     For cardiac management. Patient is not on aspirin. Reports that her eardrum ruptured a couple of weeks ago, but did finally heal. Last lab work was done on 04/09/19, in chart under media. Reports that she had some chest tightness with fluttering. Reports that she does get short of breath with exertion, tires her.    • Med Refill     Does not need refills at time. Brought medication list with visit.        Cardiac Complaints  dyspnea, fatigue and palpitations      Subjective   Dolores Dejesus is a 43 y.o. female with anxiety, migraines, palpitations, POTS, and syncope.  She was initially referred in 2017 for syncope and palpitations.   She underwent multiple investigations including echocardiogram, Holter monitor, stress test, and a Tilt table.  She had normal LV systolic function, mild valvular regurgitation and no ischemia.  The tilt table was positive for hypotension and near syncopal episode.  She used to be on Florinef which was changed to Midodrine.  Unfortunately, the pharmacist was refilled both of them and she was taking both 3 times a day.  She then went to [place] and started having more symptoms of dizziness, lightheadedness, leg edema, and a bloated feeling.  She called the office and  was advised to stop the Florinef. She returned last visit with more frequent dizzy spells and palpitations. MCOT was recommended for 30 days to assess for any arrhythmia. MCOT showed an average heart rate of 82 with no arrythmia and several instances of sinus tachycardia for which patient was symptomatic.  She was advised to add metoprolol prn but became symptomatic with medication. She was urged to continue with daily BB use and change BB to CCB prn.   It was then changed to CCB (diltiazem 30mg)  to be used prn if palpitations should occur. Carotid US was negative for flow limiting stenosis bilaterally.    Patient returns today for follow up and reports continued issues  with dizziness and what she reports as visual disturbances of the right eye.  When questioned, she is unsure if this may be an aura related to her migraines but does report still receiving botox injections with MD in [place] in regards. Fluttering persists as well as palpitations but she admits with the extra CCB as needed for palpitations symptoms are well managed. Shortness of breath remains a continued problem as well as fatigue.  Patient denies either are any worse than before but admits no marked improvement.  Patient does report a loss of hearing of the right ear over the summer as she had a traumatic ear drum rupture but admits it has slowly been improving. Labs from April 2019 with PCP show:  H/H 13.2/40.3, GLU 86, BUN 10, Creatinine 0.71, , Na 140, K 4.5, Ca 9.2, AST 12, ALT 12.  No refills needed.           Cardiac History  Past Surgical History:   Procedure Laterality Date   • CARDIOVASCULAR STRESS TEST  08/21/2018    R.Stress- 6 Min. 46 secs. 7.0 METS. 76% THR. BP- 121/73. Pt had CP. Inconclusive test   • CARDIOVASCULAR STRESS TEST  10/17/2018    L. Cardiolite- Negative.   • CONVERTED (HISTORICAL) HOLTER  06/01/2017    @[email or web address].AVG HR 98 BPM   • ECHO - CONVERTED  06/14/2017    @[facility]. EF 60%. Mild AI   • ECHO - CONVERTED  06/22/2017    EF 60%. MIld- Mod AI. No Shunt   • ECHO - CONVERTED  08/21/2018    EF 55%. Mild AI & MR. RVSP- 26 mmHg.   • OTHER SURGICAL HISTORY  07/10/2017    Extended Monitor- AVG HR 81 BPM. [place] an Syncope with no EKG changes   • OTHER SURGICAL HISTORY  09/18/2018    Tilt Table- Positive.   • OTHER SURGICAL HISTORY  05/09/2019    MCOT:  High , low 51, AVG 82, sinus noted, rare PVC, no arrythymia   • US CAROTID UNILATERAL  04/09/2019    [facility]:  No flow limiting stenosis bilaterally       Current Outpatient Medications   Medication Sig Dispense Refill   • ALPRAZolam (XANAX) 0.5 MG tablet Take 0.5 mg by mouth Daily.     • butalbital-acetaminophen-caffeine (FIORICET) -40 MG  per tablet Take 1 tablet by mouth Every 4 (Four) Hours As Needed for Headache.     • cyclobenzaprine (FLEXERIL) 10 MG tablet 2 tablets daily     • diltiaZEM (CARDIZEM) 30 MG tablet Take 1 tablet by mouth as needed for palpitations or tachycardia. No more than two doses a day. 180 tablet 1   • Erenumab-aooe (AIMOVIG, 140 MG DOSE, SC) Inject 1 dose under the skin into the appropriate area as directed Every 30 (Thirty) Days.     • estradiol (CLIMARA) 0.1 MG/24HR patch Place 1 patch on the skin as directed by provider 2 (Two) Times a Week.     • meloxicam (MOBIC) 15 MG tablet Take 15 mg by mouth Daily.     • methocarbamol (ROBAXIN) 750 MG tablet Take 750 mg by mouth Daily.     • metoprolol tartrate (LOPRESSOR) 100 MG tablet Take 1 tablet by mouth 2 (Two) Times a Day. 180 tablet 11   • midodrine (PROAMATINE) 5 MG tablet Take 1 tablet by mouth 3 (Three) Times a Day. 270 tablet 3   • OnabotulinumtoxinA (BOTOX IJ) Inject  as directed Every 3 (Three) Months.     • ondansetron (ZOFRAN) 4 MG tablet Take 4 mg by mouth Every 8 (Eight) Hours As Needed.     • sertraline (ZOLOFT) 100 MG tablet Take 100 mg by mouth 2 (Two) Times a Day.     • topiramate (TOPAMAX) 50 MG tablet 1 1/2 tablets twice a day     • zolpidem CR (AMBIEN CR) 12.5 MG CR tablet Take 12.5 mg by mouth At Night As Needed for Sleep.       No current facility-administered medications for this visit.        Carbamates and Carisoprodol    Past Medical History:   Diagnosis Date   • Anxiety    • Asthma    • Chronic kidney disease     hx of kidney stones   • Depression    • History of ear surgery    • Hx of hysterectomy 06/12/2018   • Hypoglycemia    • Migraines    • S/P ACL surgery    • S/P removal of ovarian cyst        Social History     Socioeconomic History   • Marital status:      Spouse name: Not on file   • Number of children: Not on file   • Years of education: Not on file   • Highest education level: Not on file   Tobacco Use   • Smoking status: Never  "Smoker   • Smokeless tobacco: Never Used   Substance and Sexual Activity   • Alcohol use: Yes     Comment: occasional   • Drug use: No       Family History   Problem Relation Age of Onset   • Diabetes Mother    • Arthritis Father    • Breast cancer Sister    • No Known Problems Sister    • No Known Problems Sister    • Heart disease Son    • Heart attack Son    • Autism Son        Review of Systems   Constitution: Positive for malaise/fatigue. Negative for weakness.   HENT: Positive for hearing loss. Negative for sore throat.    Eyes: Positive for vision loss in right eye and visual disturbance.   Cardiovascular: Positive for dyspnea on exertion, irregular heartbeat, near-syncope and palpitations. Negative for chest pain, claudication, cyanosis, leg swelling and syncope.   Respiratory: Positive for shortness of breath. Negative for cough and wheezing.    Musculoskeletal: Negative for back pain, joint pain and joint swelling.   Gastrointestinal: Negative for anorexia, heartburn, nausea and vomiting.   Genitourinary: Negative for dysuria, hematuria, hesitancy and nocturia.   Neurological: Positive for dizziness and light-headedness. Negative for loss of balance.   Psychiatric/Behavioral: Negative for depression and memory loss. The patient is nervous/anxious.            Objective     /80 (BP Location: Left arm)   Pulse 80   Ht 161.5 cm (63.58\")   Wt 80.3 kg (177 lb)   BMI 30.78 kg/m²     Physical Exam   Constitutional: She is oriented to person, place, and time. She appears well-developed and well-nourished.   HENT:   Head: Normocephalic and atraumatic.   Eyes: EOM are normal. Pupils are equal, round, and reactive to light.   Neck: Normal range of motion. Neck supple.   Cardiovascular: Normal rate and regular rhythm.   Murmur heard.  Pulmonary/Chest: Effort normal and breath sounds normal.   Abdominal: Soft.   Musculoskeletal: Normal range of motion.   Neurological: She is alert and oriented to person, " place, and time.   Skin: Skin is warm and dry.   Psychiatric: She has a normal mood and affect. Her behavior is normal.       Procedures    Assessment/Plan     HR is stable today with regular rhythm noted. Patient reports that with the use of CCB prn, palpitations are well managed. Most recent MCOT showed mostly NSR with avg HR of 82 and rare instances of sinus tachycardia. She was urged to continue with daily BB therapy for management of POTS as well as midodrine therapy TID.  Patient urged to discuss pulmonary workup, TSH, VIT B 12, folate, with your office as she continues to report shortness of breath and fatigue. EF has remained normal with only mild AI noted on echo, no ischemic burden. Carotid US that was done in the spring showed no flow limiting stenosis bilaterally of either carotid artery.  Patient was urged to discuss possible CT of the head in regards to right eye visual disturbance with you vs neurology whom she sees for migraine therapy botox injections.  BMI noted at 30.78, good cardiac diet with limited carbs, calories, caffeine intake, increased water intake, and activity as tolerated advised. 6 month follow up recommended or sooner if needed. 6 month follow up advised or sooner if needed.         Problems Addressed this Visit        Cardiovascular and Mediastinum    Near syncope    Tachycardia    Palpitations       Other    Dizziness - Primary    Hx of migraine headaches      Other Visit Diagnoses     Vision loss of right eye        Headache disorder        Relevant Medications    Erenumab-aooe (AIMOVIG, 140 MG DOSE, SC)    POTS (postural orthostatic tachycardia syndrome)        Anxiety        Obesity (BMI 30.0-34.9)        Other specified hearing loss of right ear, unspecified hearing status on contralateral side              Patient's Body mass index is 30.78 kg/m². BMI is above normal parameters. Recommendations include: nutrition counseling.                Electronically signed by Clarisa GONZALEZ  GIDEON Stephens September 30, 2019 4:51 PM

## 2019-11-19 DIAGNOSIS — I95.1 ORTHOSTATIC HYPOTENSION: ICD-10-CM

## 2019-11-20 RX ORDER — MIDODRINE HYDROCHLORIDE 5 MG/1
TABLET ORAL
Qty: 270 TABLET | Refills: 3 | Status: SHIPPED | OUTPATIENT
Start: 2019-11-20 | End: 2020-04-24

## 2020-01-27 ENCOUNTER — TELEPHONE (OUTPATIENT)
Dept: CARDIOLOGY | Facility: CLINIC | Age: 45
End: 2020-01-27

## 2020-01-27 DIAGNOSIS — R55 SYNCOPE AND COLLAPSE: Primary | ICD-10-CM

## 2020-01-27 DIAGNOSIS — R00.2 PALPITATIONS: ICD-10-CM

## 2020-01-27 NOTE — TELEPHONE ENCOUNTER
Patient called stating that she has had a couple of episodes of fainting. She states that the last time it happened was 2 days ago. She started feeling dizzy and felt like she needed to sit down, and passed out for a few seconds. She states that she did go to [facility] and they detected an abnormal HR when she first arrived, but then it went back to normal. She states that her blood pressure was elevated when she got to hospital and then started coming down. She states that she felt like she was having a heart attack, she had broken out in a sweat. She states that [facility] did not do anything for her and did not recognizes that she had POTS and told her she was over exaggerating.     She is asking what recommendations that you have?

## 2020-01-27 NOTE — TELEPHONE ENCOUNTER
Continue to monitor. She may need to wear a holter. Chioma can put that on her. Keep hydrated.  Limit sodium, monitor blood pressure.

## 2020-01-27 NOTE — TELEPHONE ENCOUNTER
Patient was made aware of recommendations. She states that she saw PCP after the last episode of fainting and they wanted her to let our office know to see what we wanted to do. If you want to order holter, patient states that she could come to [place] to have it put on.    She states that she drinks a lot of water, has had two UTI's and has been drinking a lot to help keep those away.

## 2020-01-28 ENCOUNTER — CLINICAL SUPPORT (OUTPATIENT)
Dept: CARDIOLOGY | Facility: CLINIC | Age: 45
End: 2020-01-28

## 2020-01-28 DIAGNOSIS — R00.2 PALPITATIONS: ICD-10-CM

## 2020-01-28 DIAGNOSIS — R55 SYNCOPE AND COLLAPSE: ICD-10-CM

## 2020-01-28 PROCEDURE — 0296T PR EXT ECG > 48HR TO 21 DAY RCRD W/CONECT INTL RCRD: CPT | Performed by: INTERNAL MEDICINE

## 2020-02-18 ENCOUNTER — OUTSIDE FACILITY SERVICE (OUTPATIENT)
Dept: CARDIOLOGY | Facility: CLINIC | Age: 45
End: 2020-02-18

## 2020-02-18 PROCEDURE — 0298T PR EXT ECG > 48HR TO 21 DAY REVIEW AND INTERPRETATN: CPT | Performed by: INTERNAL MEDICINE

## 2020-04-24 ENCOUNTER — OFFICE VISIT (OUTPATIENT)
Dept: CARDIOLOGY | Facility: CLINIC | Age: 45
End: 2020-04-24

## 2020-04-24 VITALS
WEIGHT: 182 LBS | SYSTOLIC BLOOD PRESSURE: 102 MMHG | HEIGHT: 64 IN | BODY MASS INDEX: 31.07 KG/M2 | DIASTOLIC BLOOD PRESSURE: 85 MMHG | HEART RATE: 101 BPM

## 2020-04-24 DIAGNOSIS — G90.A POTS (POSTURAL ORTHOSTATIC TACHYCARDIA SYNDROME): Primary | ICD-10-CM

## 2020-04-24 PROCEDURE — 99443 PR PHYS/QHP TELEPHONE EVALUATION 21-30 MIN: CPT | Performed by: INTERNAL MEDICINE

## 2020-04-24 RX ORDER — DIAZEPAM 5 MG/1
5 TABLET ORAL 3 TIMES DAILY
COMMUNITY
Start: 2020-04-01 | End: 2023-03-22

## 2020-04-24 NOTE — PROGRESS NOTES
Cardiac Electrophysiology Outpatient Consult Note            [facility]     Consult Note     Dolores Dejesus  [id number]  04/24/2020       Primary Care Physician: Adin Lundy APRN    Referred By: Ladi Jay DO    Subjective     Chief Complaint:   Chief Complaint   Patient presents with   • Dizziness     fallen twice in the shower within the last couple weeks    • Chest Pain   • Fatigue       Please note that this is a telephone health clinic.  The patient consented to have a tele-health visit due to the global viral pandemic.  The patient understands that the risk-benefit profile of coming into the office physically is quite unfavorable at this point, that patient safety concerns are foremost and that this tele-health visit is consistent with recommendations from the Centers for Disease Control ( CDC ).  We spent a total of 29 minutes 4 seconds in tele-clinic with the patient today.    History of Present Illness:   Mrs. Dolores Dejesus is a 44 y.o. female who presents to my electrophysiology clinic for evaluation of passing out.  Very pleasant female patient who moved in 2015 from [place] and then after that started to have severe episodes of presyncope.  Medially prior to this she underwent a hysterectomy for dysfunctional uterine bleeding.  Since then she has had episodes where she feels warm all over flushed and then a profound sense of presyncope.  These will occur while standing but also occasionally while seateed, and have even occurred while driving a car on a few occasions.  The episodes happen so frequently now that she is completely debilitated.  She is a  mother of several children.  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  She has multiple children who when they lived in [place] were highly active in sports and she was also highly active as well.  This is occurred she has been unable to do any of these  activities.  With even minimal physical exercise she feels like she is going to pass out.  She has terrible migraine headaches as well.  She sees a headache and neurology specialist for this.    She is never had any significant physical injury while falling apart from time when she fell down a flight of stairs and hurt her hand which required surgery.    She has been prescribed multiple medications including midodrine and metoprolol however none of these have made her feel better.  She has indeed stopped these on her own accord due to making her feel worse rather than better.    She does take Zoloft for depression and follows with a psychiatrist.    Takes Topamax for headaches and follows with a headache specialist for this.    When these episodes happen she will feel her heart beat forcefully sometimes even fast.  The sensation is always associated with his profound sense of fatigue afterwards.        Review of Systems:   Constitutional: weight gain  Eyes: No visual loss, blurred vision, double vision, yellow sclerae.  ENT: chronic ear drum  Cardiovascular: Per HPI  Respiratory: No cough or wheezing, no sputum production, no hematemesis   Gastrointestinal: No abdominal pain, no nausea, vomiting, constipation, diarrhea, melena.   Genitourinary: No dysuria, hematuria or increased frequency.  Musculoskeletal:  No gait disturbance, weakness or joint pain or stiffness  Integumentary: No rashes, urticaria, ulcers or sores.   Neurological: No headache, dizziness, syncope, paralysis, ataxia, no prior CVA/TIA  Psychiatric: No anxiety, or depression  Endocrine: No diaphoresis, cold or heat intolerance. No polyuria or polydipsia.   Hematologic/Lymphatic: No anemia, abnormal bruising or bleeding. No history of DVT/PE.    Past Medical History:   Past Medical History:   Diagnosis Date   • Anxiety    • Asthma    • Chronic kidney disease     hx of kidney stones   • Depression    • History of ear surgery    • Hx of hysterectomy  06/12/2018   • Hypoglycemia    • Migraines    • S/P ACL surgery    • S/P removal of ovarian cyst        Past Surgical History:   Past Surgical History:   Procedure Laterality Date   • CARDIOVASCULAR STRESS TEST  08/21/2018    R.Stress- 6 Min. 46 secs. 7.0 METS. 76% THR. BP- 121/73. Pt had CP. Inconclusive test   • CARDIOVASCULAR STRESS TEST  10/17/2018    L. Cardiolite- Negative.   • CONVERTED (HISTORICAL) HOLTER  06/01/2017    @[facility].AVG HR 98 BPM   • ECHO - CONVERTED  06/14/2017    @[facility]. EF 60%. Mild AI   • ECHO - CONVERTED  06/22/2017    EF 60%. MIld- Mod AI. No Shunt   • ECHO - CONVERTED  08/21/2018    EF 55%. Mild AI & MR. RVSP- 26 mmHg.   • OTHER SURGICAL HISTORY  07/10/2017    Extended Monitor- AVG HR 81 BPM. [place] an Syncope with no EKG changes   • OTHER SURGICAL HISTORY  09/18/2018    Tilt Table- Positive.   • OTHER SURGICAL HISTORY  05/09/2019    MCOT:  High , low 51, AVG 82, sinus noted, rare PVC, no arrythymia   • US CAROTID UNILATERAL  04/09/2019    Jensen:  No flow limiting stenosis bilaterally       Family History:   Family History   Problem Relation Age of Onset   • Diabetes Mother    • Arthritis Father    • Breast cancer Sister    • No Known Problems Sister    • No Known Problems Sister    • Heart disease Son    • Heart attack Son    • Autism Son        Social History:   Social History     Socioeconomic History   • Marital status:      Spouse name: Not on file   • Number of children: Not on file   • Years of education: Not on file   • Highest education level: Not on file   Tobacco Use   • Smoking status: Never Smoker   • Smokeless tobacco: Never Used   Substance and Sexual Activity   • Alcohol use: Yes     Comment: occasional   • Drug use: No       Medications:     Current Outpatient Medications:   •  butalbital-acetaminophen-caffeine (FIORICET) -40 MG per tablet, Take 1 tablet by mouth Every 4 (Four) Hours As Needed for Headache., Disp: , Rfl:   •  cyclobenzaprine (FLEXERIL) 10 MG  "tablet, 2 tablets daily, Disp: , Rfl:   •  diazePAM (VALIUM) 5 MG tablet, Take 5 mg by mouth Daily., Disp: , Rfl:   •  diltiaZEM (CARDIZEM) 30 MG tablet, Take 1 tablet by mouth as needed for palpitations or tachycardia. No more than two doses a day., Disp: 180 tablet, Rfl: 1  •  Erenumab-aooe (AIMOVIG, 140 MG DOSE, SC), Inject 1 dose under the skin into the appropriate area as directed Every 30 (Thirty) Days., Disp: , Rfl:   •  estradiol (CLIMARA) 0.1 MG/24HR patch, Place 1 patch on the skin as directed by provider 2 (Two) Times a Week., Disp: , Rfl:   •  meloxicam (MOBIC) 15 MG tablet, Take 15 mg by mouth Daily., Disp: , Rfl:   •  methocarbamol (ROBAXIN) 750 MG tablet, Take 750 mg by mouth 3 (Three) Times a Day., Disp: , Rfl:   •  metoprolol tartrate (LOPRESSOR) 100 MG tablet, Take 1 tablet by mouth 2 (Two) Times a Day., Disp: 180 tablet, Rfl: 11  •  midodrine (PROAMATINE) 5 MG tablet, TAKE 1 TABLET THREE TIMES A DAY, Disp: 270 tablet, Rfl: 3  •  OnabotulinumtoxinA (BOTOX IJ), Inject  as directed Every 3 (Three) Months., Disp: , Rfl:   •  ondansetron (ZOFRAN) 4 MG tablet, Take 4 mg by mouth Every 8 (Eight) Hours As Needed., Disp: , Rfl:   •  sertraline (ZOLOFT) 100 MG tablet, Take 100 mg by mouth 2 (Two) Times a Day., Disp: , Rfl:   •  topiramate (TOPAMAX) 50 MG tablet, 1 1/2 tablets twice a day, Disp: , Rfl:     Allergies:   Allergies   Allergen Reactions   • Carbamates Hives   • Carisoprodol Hives       Objective     Physical Exam:  Vital Signs:   Vitals:    04/24/20 0926   BP: 102/85   BP Location: Left arm   Patient Position: Sitting   Pulse: 101   Weight: 82.6 kg (182 lb)   Height: 162.6 cm (64\")       NEURO: No focal deficits, alert and oriented x 3  PSYCHIATRIC: Normal affect and mood, appropriate use of semantics and logic.      No results found for: GLUCOSE, CALCIUM, NA, K, CO2, CL, BUN, CREATININE, EGFRIFAFRI, EGFRIFNONA, BCR, ANIONGAP  Lab Results   Component Value Date    WBC 26 to 100 08/31/2018     No " results found for: INR, PROTIME  No results found for: TSH, T3VGEYL, Q1LQKOJ, THYROIDAB    Cardiac Testing:      I personally viewed and interpreted the patient's EKG/Telemetry/lab data    Procedures    Tobacco Cessation: N/A  Obstructive Sleep Apnea Screening: N/A    Assessment & Plan      This is a 44-year-old female patient whose life has been significantly disrupted by what seems to be a rather profound ACE of autonomic dysfunction.  I am not sure at all of the whether she has postural orthostatic tachycardia syndrome versus just severe vasovagal syncope.  At certain level on not sure that this distinction really matters.  It is clear however that everything that has been tried thus far really has not aided her at all.  I did discuss with her really that is important not only to increase fluid intake but also dramatically increase salt intake.  She admits that this is not something she has done before.  We discussed exercise however she notes to me that even with a minimal degree of walking she feels presyncopal.  Beta-blockers have been entirely ineffective midodrine is been entirely ineffective.  She is already on a very large dose of Zoloft.    I was diego with this very pleasant patient and I really think that she would be best served by referral to a clinic at a quaternary referral center that specializes in autonomic dysfunction.  I am familiar with the team at the [facility].  The patient also had mentioned to me that she given some thought to perhaps going down to [place].  We will refer her to the team the [facility] under Dr. Herminio Archuleta ( Aut[facility]ysfunction [facility] ) with another option being [place].  She will need a referral and I will ask our office to help arrange this.    There are no diagnoses linked to this encounter.        Follow Up:       Thank you for allowing me to participate in the care of your patient. Please to not hesitate to contact me with additional  questions or concerns.        Kalia Martínez, DO, FACC, RS  Cardiac Electrophysiologist

## 2020-04-29 ENCOUNTER — DOCUMENTATION (OUTPATIENT)
Dept: CARDIOLOGY | Facility: CLINIC | Age: 45
End: 2020-04-29

## 2020-07-13 ENCOUNTER — APPOINTMENT (OUTPATIENT)
Dept: WOMENS IMAGING | Facility: HOSPITAL | Age: 45
End: 2020-07-13

## 2020-07-13 PROCEDURE — 77063 BREAST TOMOSYNTHESIS BI: CPT | Performed by: RADIOLOGY

## 2020-07-13 PROCEDURE — 77067 SCR MAMMO BI INCL CAD: CPT | Performed by: RADIOLOGY

## 2020-07-30 PROCEDURE — 93306 TTE W/DOPPLER COMPLETE: CPT | Performed by: INTERNAL MEDICINE

## 2020-08-03 ENCOUNTER — OUTSIDE FACILITY SERVICE (OUTPATIENT)
Dept: CARDIOLOGY | Facility: CLINIC | Age: 45
End: 2020-08-03

## 2021-01-21 ENCOUNTER — OFFICE VISIT (OUTPATIENT)
Dept: CARDIOLOGY | Facility: CLINIC | Age: 46
End: 2021-01-21

## 2021-01-21 VITALS
HEART RATE: 92 BPM | DIASTOLIC BLOOD PRESSURE: 82 MMHG | BODY MASS INDEX: 31.41 KG/M2 | HEIGHT: 64 IN | TEMPERATURE: 97.7 F | SYSTOLIC BLOOD PRESSURE: 114 MMHG | WEIGHT: 184 LBS

## 2021-01-21 DIAGNOSIS — I95.1 ORTHOSTATIC HYPOTENSION: ICD-10-CM

## 2021-01-21 DIAGNOSIS — R00.2 PALPITATIONS: ICD-10-CM

## 2021-01-21 DIAGNOSIS — Z86.69 HX OF MIGRAINE HEADACHES: ICD-10-CM

## 2021-01-21 DIAGNOSIS — I47.1 PAROXYSMAL SVT (SUPRAVENTRICULAR TACHYCARDIA) (HCC): Primary | ICD-10-CM

## 2021-01-21 DIAGNOSIS — Z79.899 MEDICATION MANAGEMENT: ICD-10-CM

## 2021-01-21 PROCEDURE — 99214 OFFICE O/P EST MOD 30 MIN: CPT | Performed by: NURSE PRACTITIONER

## 2021-01-21 RX ORDER — FLUTICASONE PROPIONATE 50 MCG
2 SPRAY, SUSPENSION (ML) NASAL NIGHTLY
COMMUNITY

## 2021-01-21 RX ORDER — LORATADINE 10 MG/1
10 CAPSULE, LIQUID FILLED ORAL DAILY
COMMUNITY

## 2021-01-21 NOTE — PROGRESS NOTES
Chief Complaint   Patient presents with   • Follow-up     for cardiac management   • Rapid Heart Rate     episode while on vacation, transported to ER, was only taking Cardizem prn, told her to take daily, no more problems since increasing except increased fatigue.    • Cardiologist     PCP sent to cardio in [place] for second opinion, was going to send her to [facility] but Covid came, was never    • Loss of Consciousness     numerous episodes since last visit, went to [facility]. saw neurologist, diagnosed with POTS    • Labs     pt brought most recent labs.        Subjective       Dolores Dejesus is a 45 y.o. female  with anxiety, migraines, palpitations, POTS, and syncope.  She was initially referred in 2017 for syncope and palpitations.   She underwent multiple investigations including echocardiogram, Holter monitor, stress test, and a Tilt table.  She had normal LV systolic function, mild valvular regurgitation and no ischemia.  The tilt table was positive for hypotension and near syncopal episode. Symptoms persisted and MCOT ordered. Results showed an average heart rate of 82 with no arrythmia and several instances of sinus tachycardia for which patient was symptomatic.  She was advised to add metoprolol prn but became symptomatic with medication. It was then changed to CCB (diltiazem 30mg)  to be used prn if palpitations should occur. Carotid US was negative.    In 2020 she went to cardiologist, Dr. Jay, for second opinion. She was then referred to Dr. Martínez whom she seen in April. It was noted beta-blocker and midodrine had been ineffective.  She continued to be treated with SSRI in form of Zoloft. He recommended referral to autonomic dysfunction clinic, specifically  at [facility]. She was seen at [facility] in October and underwent Extensive workup was done including normal echocardiogram, 26 day cardiac telemetry in which symptoms correlated with sinus tach, and unremarkable carotid  ultrasound. Neuro cardio autonomic test panel reported no evidence of orthostatic tachycardia or orthostatic tachycardia or hypotension.     Today she returns to the office due to documented symptomatic SVT. At the time she was visiting in [place]. Her heart rate increased and she developed shortness of breath and significant generalized weakness. The symptoms progressively worsened therefore 911 was called. According to patient she experienced the tachycardia for about an hour prior to Adenosine being given. After successful chemical cardioversion her symptoms began to subside. She was discharged home with daily CCB. No reoccurrence of tachycardiac noted. She now wants to proceed with EP study and ablation if needed. She request referral to Dr. Dee since her son is currently his patient.     Cardiac History:    Past Surgical History:   Procedure Laterality Date   • CARDIOVASCULAR STRESS TEST  08/21/2018    R.Stress- 6 Min. 46 secs. 7.0 METS. 76% THR. BP- 121/73. Pt had CP. Inconclusive test   • CARDIOVASCULAR STRESS TEST  10/17/2018    L. Cardiolite- Negative.   • CONVERTED (HISTORICAL) HOLTER  06/01/2017    @[facility].AVG HR 98 BPM   • ECHO - CONVERTED  06/14/2017    @[facility]. EF 60%. Mild AI   • ECHO - CONVERTED  06/22/2017    EF 60%. MIld- Mod AI. No Shunt   • ECHO - CONVERTED  08/21/2018    EF 55%. Mild AI & MR. RVSP- 26 mmHg.   • ECHO - CONVERTED  08/03/2020    @ [facility]. EF 60%. Mild MR & AI. No AS. RVSP- 26 mmHg   • OTHER SURGICAL HISTORY  07/10/2017    Extended Monitor- AVG HR 81 BPM. [place] an Syncope with no EKG changes   • OTHER SURGICAL HISTORY  09/18/2018    Tilt Table- Positive.   • OTHER SURGICAL HISTORY  05/09/2019    MCOT:  High , low 51, AVG 82, sinus noted, rare PVC, no arrythymia   • US CAROTID UNILATERAL  04/09/2019    Jensen:  No flow limiting stenosis bilaterally       Current Outpatient Medications   Medication Sig Dispense Refill   • butalbital-acetaminophen-caffeine (FIORICET) -40 MG per  tablet Take 1 tablet by mouth Every 4 (Four) Hours As Needed for Headache.     • cyclobenzaprine (FLEXERIL) 10 MG tablet 2 tablets daily     • diazePAM (VALIUM) 5 MG tablet Take 5 mg by mouth Daily.     • diltiaZEM (CARDIZEM) 30 MG tablet Take 1 tablet by mouth as needed for palpitations or tachycardia. No more than two doses a day. (Patient taking differently: Daily. Take 1 tablet by mouth as needed for palpitations or tachycardia. No more than two doses a day.) 180 tablet 1   • Erenumab-aooe (AIMOVIG, 140 MG DOSE, SC) Inject 1 dose under the skin into the appropriate area as directed Every 30 (Thirty) Days.     • estradiol (CLIMARA) 0.1 MG/24HR patch Place 1 patch on the skin as directed by provider 2 (Two) Times a Week.     • fluticasone (FLONASE) 50 MCG/ACT nasal spray 2 sprays into the nostril(s) as directed by provider Daily.     • Loratadine (Claritin) 10 MG capsule Take  by mouth.     • meloxicam (MOBIC) 15 MG tablet Take 15 mg by mouth Daily.     • methocarbamol (ROBAXIN) 750 MG tablet Take 750 mg by mouth 3 (Three) Times a Day.     • OnabotulinumtoxinA (BOTOX IJ) Inject  as directed Every 3 (Three) Months.     • ondansetron (ZOFRAN) 4 MG tablet Take 4 mg by mouth Every 8 (Eight) Hours As Needed.     • sertraline (ZOLOFT) 100 MG tablet Take 100 mg by mouth 2 (Two) Times a Day.     • topiramate (TOPAMAX) 50 MG tablet 1 1/2 tablets twice a day       No current facility-administered medications for this visit.        Carbamates and Carisoprodol    Past Medical History:   Diagnosis Date   • Anxiety    • Asthma    • Chronic kidney disease     hx of kidney stones   • Depression    • History of ear surgery    • Hx of hysterectomy 06/12/2018   • Hypoglycemia    • Migraines    • S/P ACL surgery    • S/P removal of ovarian cyst        Social History     Socioeconomic History   • Marital status:      Spouse name: Not on file   • Number of children: Not on file   • Years of education: Not on file   • Highest  "education level: Not on file   Tobacco Use   • Smoking status: Never Smoker   • Smokeless tobacco: Never Used   Substance and Sexual Activity   • Alcohol use: Yes     Comment: occasional   • Drug use: No       Family History   Problem Relation Age of Onset   • Diabetes Mother    • Arthritis Father    • Breast cancer Sister    • No Known Problems Sister    • No Known Problems Sister    • Heart disease Son    • Heart attack Son    • Autism Son        Review of Systems   Constitution: Positive for malaise/fatigue. Negative for decreased appetite and diaphoresis.   HENT: Negative for nosebleeds.    Eyes: Negative for blurred vision.   Cardiovascular: Positive for near-syncope (with palpitations) and palpitations. Negative for chest pain, claudication, cyanosis, dyspnea on exertion, irregular heartbeat, leg swelling, orthopnea, paroxysmal nocturnal dyspnea and syncope.   Respiratory: Positive for shortness of breath (with palpitations).    Endocrine: Negative for cold intolerance and heat intolerance.   Hematologic/Lymphatic: Does not bruise/bleed easily.   Skin: Negative for rash.   Musculoskeletal: Negative for myalgias.   Gastrointestinal: Positive for bloating and abdominal pain. Negative for heartburn, melena and nausea.   Genitourinary: Negative for dysuria and hematuria.   Neurological: Positive for headaches (improved with Botox and aimovig) and weakness (with palpitations). Negative for dizziness and light-headedness.   Psychiatric/Behavioral: The patient is nervous/anxious. The patient does not have insomnia.         BP Readings from Last 5 Encounters:   01/21/21 114/82   04/24/20 102/85   09/30/19 120/80   03/28/19 110/78   12/12/18 110/70       Wt Readings from Last 5 Encounters:   01/21/21 83.5 kg (184 lb)   04/24/20 82.6 kg (182 lb)   09/30/19 80.3 kg (177 lb)   03/28/19 77.6 kg (171 lb)   12/12/18 75.3 kg (166 lb)       Objective     /82   Pulse 92   Temp 97.7 °F (36.5 °C)   Ht 162.6 cm (64\")   " Wt 83.5 kg (184 lb)   BMI 31.58 kg/m²     Vitals signs and nursing note reviewed.   Eyes:      Pupils: Pupils are equal, round, and reactive to light.   HENT:      Head: Normocephalic.   Neck:      Musculoskeletal: Normal range of motion.   Pulmonary:      Breath sounds: Normal breath sounds.   Cardiovascular:      Normal rate. Regular rhythm.   Edema:     Peripheral edema absent.   Abdominal:      General: Bowel sounds are normal.      Palpations: Abdomen is soft.   Musculoskeletal: Normal range of motion.   Skin:     General: Skin is warm.   Neurological:      Mental Status: Alert and oriented to person, place, and time.            ECG 12 Lead    Date/Time: 1/22/2021 12:33 PM  Performed by: Kenna Harvey APRN  Authorized by: Kenna Harvey APRN   Comparison: compared with previous ECG from 12/12/2018  Comparison to previous ECG: Sinus tach, 104 bpm  Rhythm: sinus rhythm  BPM: 97                   Assessment/Plan   Diagnoses and all orders for this visit:    1. Paroxysmal SVT (supraventricular tachycardia) (CMS/HCC) (Primary)  -     Ambulatory Referral to Cardiac Electrophysiology    2. Palpitations  -     Ambulatory Referral to Cardiac Electrophysiology    3. Orthostatic hypotension    4. Hx of migraine headaches    5. Medication management    Other orders  -     ECG 12 Lead    PSVT- EKG today sinus with normal PA and QT intervals. Continue Cardizem. Referral to Dr. Dee per patient request for further evaluation and recommendations.     POTS- had not tolerated medication for BP support or beta blocker. She is taking SSRI without issue. Continue hydration and adequate dietary salt intake.     Migraines- followed by neurologist. Patient admits improvement of headaches with current treatment.       Patient's Body mass index is 31.58 kg/m². BMI is above normal parameters. Recommendations include: nutrition counseling.     A 6 month follow up visit scheduled. Please call sooner for cardiac concerns.              Electronically signed by GIDEON Vallejo,  January 22, 2021 12:38 EST

## 2021-01-22 PROBLEM — I47.10 PSVT (PAROXYSMAL SUPRAVENTRICULAR TACHYCARDIA): Status: ACTIVE | Noted: 2021-01-22

## 2021-01-22 PROBLEM — I47.1 PSVT (PAROXYSMAL SUPRAVENTRICULAR TACHYCARDIA) (HCC): Status: ACTIVE | Noted: 2021-01-22

## 2021-01-22 PROCEDURE — 93000 ELECTROCARDIOGRAM COMPLETE: CPT | Performed by: NURSE PRACTITIONER

## 2021-05-05 ENCOUNTER — OFFICE VISIT (OUTPATIENT)
Dept: CARDIOLOGY | Facility: CLINIC | Age: 46
End: 2021-05-05

## 2021-05-05 VITALS
HEIGHT: 64 IN | TEMPERATURE: 97.3 F | HEART RATE: 93 BPM | BODY MASS INDEX: 29.71 KG/M2 | DIASTOLIC BLOOD PRESSURE: 84 MMHG | SYSTOLIC BLOOD PRESSURE: 116 MMHG | WEIGHT: 174 LBS

## 2021-05-05 DIAGNOSIS — I95.1 ORTHOSTATIC HYPOTENSION: ICD-10-CM

## 2021-05-05 DIAGNOSIS — R00.0 TACHYCARDIA: ICD-10-CM

## 2021-05-05 DIAGNOSIS — I47.1 PSVT (PAROXYSMAL SUPRAVENTRICULAR TACHYCARDIA) (HCC): Primary | ICD-10-CM

## 2021-05-05 DIAGNOSIS — G90.A POTS (POSTURAL ORTHOSTATIC TACHYCARDIA SYNDROME): ICD-10-CM

## 2021-05-05 PROCEDURE — 99213 OFFICE O/P EST LOW 20 MIN: CPT | Performed by: INTERNAL MEDICINE

## 2021-05-05 RX ORDER — COLESEVELAM 180 1/1
1250 TABLET ORAL
COMMUNITY
Start: 2021-02-25 | End: 2022-05-18

## 2021-05-05 NOTE — PROGRESS NOTES
Dolores Dejesus  1975  [phone]    05/05/2021    [facility] CARDIOLOGY     Referring Provider: Kenna Harvey APRN Partin, GIDEON Souza   [id number]  [facility] [id number]    Chief Complaint   Patient presents with   • Paroxysmal SVT       Problem List:   1. Syncope/presyncope/POTS:   a. Failed metoprolol and midodrine  b. Stress test 8/21/2018: Patient walked 6 minutes 46 seconds, rate 76% of target heart rate, patient had chest pain-inconclusive test  c. Cardiolite stress test 10/17/2018: Negative for ischemia  d. Echocardiogram 8/21/2018 EF 55% mild AI and mild MR RVSP 26 mmHg  e. Positive tilt table test 9/18/2018  f. MCOT 5/9/2019 sinus rhythm 51 to 147 bpm, average 82 bpm  2. SVT  a. SVT requiring Adenosine while in [place] 1/2021  b. EPS + RFA of SVT 2/25/2021   3. Dysfunctional uterine bleeding status post hysterectomy  4. Depression on Zoloft  5. Anxiety  6. Chronic headaches  7. Surgical history  a. Ear surgery  b. Hysterectomy  c. ACL surgery  d. Removal of ovarian cyst        Allergies  Allergies   Allergen Reactions   • Carbamates Hives   • Carisoprodol Hives       Current Medications    Current Outpatient Medications:   •  butalbital-acetaminophen-caffeine (FIORICET) -40 MG per tablet, Take 1 tablet by mouth Every 4 (Four) Hours As Needed for Headache., Disp: , Rfl:   •  colesevelam (WELCHOL) 625 MG tablet, Take 1,250 mg by mouth., Disp: , Rfl:   •  cyclobenzaprine (FLEXERIL) 10 MG tablet, 2 tablets daily, Disp: , Rfl:   •  diazePAM (VALIUM) 5 MG tablet, Take 5 mg by mouth Daily., Disp: , Rfl:   •  Erenumab-aooe (AIMOVIG, 140 MG DOSE, SC), Inject 1 dose under the skin into the appropriate area as directed Every 30 (Thirty) Days., Disp: , Rfl:   •  estradiol (CLIMARA) 0.1 MG/24HR patch, Place 1 patch on the skin as directed by provider 2 (Two) Times a Week., Disp: , Rfl:   •  fluticasone (FLONASE) 50 MCG/ACT nasal spray, 2 sprays into the nostril(s) as directed  by provider Daily., Disp: , Rfl:   •  Loratadine (Claritin) 10 MG capsule, Take  by mouth., Disp: , Rfl:   •  meloxicam (MOBIC) 15 MG tablet, Take 15 mg by mouth Daily., Disp: , Rfl:   •  methocarbamol (ROBAXIN) 750 MG tablet, Take 750 mg by mouth 3 (Three) Times a Day., Disp: , Rfl:   •  NON FORMULARY, Epidural-Bupivicane-lidocaine injections every 3 months, Disp: , Rfl:   •  OnabotulinumtoxinA (BOTOX IJ), Inject  as directed Every 3 (Three) Months., Disp: , Rfl:   •  ondansetron (ZOFRAN) 4 MG tablet, Take 4 mg by mouth Every 8 (Eight) Hours As Needed., Disp: , Rfl:   •  sertraline (ZOLOFT) 100 MG tablet, Take 100 mg by mouth 2 (Two) Times a Day., Disp: , Rfl:   •  topiramate (TOPAMAX) 50 MG tablet, 1 1/2 tablets twice a day, Disp: , Rfl:     History of Present Illness   45 year old WF with history of syncope with possible POTS vs syncope who presents to [facility] care.  She underwent multiple investigations including echocardiogram, Holter monitor, stress test, and a Tilt table.  She had normal LV systolic function, mild valvular regurgitation and no ischemia.  The tilt table was positive for hypotension and near syncopal episode. Symptoms persisted and MCOT ordered. Results showed an average heart rate of 82 with no arrythmia and several instances of sinus tachycardia for which patient was symptomatic.  She was advised to add metoprolol prn but became symptomatic with medication. It was then changed to CCB (diltiazem 30mg)  to be used prn if palpitations should occur. Carotid US was negative. She was seen by Dr. Martínez last April and recommended she see an autonomic specialist in [place] or [place]. She was seen at [facility] in October and underwent Extensive workup was done including normal echocardiogram, 26 day cardiac telemetry in which symptoms correlated with sinus tach, and unremarkable carotid ultrasound. Neuro cardio autonomic test panel reported no evidence of orthostatic tachycardia or  "orthostatic tachycardia or hypotension  Recently, she was in [place] in January, when she had an episode of tightening in her chest with fluttering in her chest, associated with dizziness with significant weakness. Her HR on her watch showed HR was 200 bpm. 911 was called and she was given Adenosine which converted her rhythm. She has had SVT ablation at [facility] in February and has been well since. In regards to her POTS, she is doing well without syncope. She occasionally has chest pain and SOB, which is normal for her. Her average HR is now running in 70-80s. However, she still has issues with her HR jumping up quickly. Beta blockers did not help her in the past.     ROS:  General:  Denies fatigue, weight gain or loss  Cardiovascular:  Denies CP, PND, syncope, near syncope, edema or palpitations.  Pulmonary:  Denies AGUILA, cough, or wheezing      Vitals:    05/05/21 1402   BP: 116/84   BP Location: Right arm   Patient Position: Sitting   Pulse: 93   Temp: 97.3 °F (36.3 °C)   Weight: 78.9 kg (174 lb)   Height: 162.6 cm (64\")     Body mass index is 29.87 kg/m².  PE:  General: NAD  Neck: no JVD, no carotid bruits, no TM  Heart RRR, NL S1, S2, S4 present, no rubs, murmurs  Lungs: CTA, no wheezes, rhonchi, or rales  Abd: soft, non-tender, NL BS  Ext: No musculoskeletal deformities, no edema, cyanosis, or clubbing  Psych: normal mood and affect    Diagnostic Data:      Procedures    1. PSVT (paroxysmal supraventricular tachycardia) (CMS/HCC)    2. POTS (postural orthostatic tachycardia syndrome)    3. Orthostatic hypotension    4. Tachycardia          Plan:  1. SVT:  - doing well s/p RFA at [facility]    2. POTS:  - doing well currently, no syncope    F/up in 12 months    Scribed for Bucky Dee MD by Taya Schofield PA-C. 5/5/2021  14:37 EDT     I, Bucky Dee MD, personally performed the services described in this documentation as scribed by the above named individual in my presence, and it is " both accurate and complete.  5/5/2021  14:37 EDT

## 2021-06-07 ENCOUNTER — TELEPHONE (OUTPATIENT)
Dept: CARDIOLOGY | Facility: CLINIC | Age: 46
End: 2021-06-07

## 2021-06-07 NOTE — TELEPHONE ENCOUNTER
Patient called and said she had pitting edema to both lower legs and feet. She had been camping over the weekend .  She adds that this has happened a couple other times recently. She wears compression socks daily.   No diuretics on medication list

## 2021-06-07 NOTE — TELEPHONE ENCOUNTER
Is she still taking Cardizem? It could be side effect. Hot weather can also contribute. Due to her history of low blood pressure I am hesitant to give her diuretic. If she has persistent swelling despite conservative management including the stockings, avoid prolonged sitting or standing and maintaining good water intake we could try low dose Lasix and consider venous doppler ultrasound.

## 2021-06-08 NOTE — TELEPHONE ENCOUNTER
Patient is no longer taking Cardizem. She is advised to drink plenty fluids wear compression socks and to avoid prolonged sitting or standing. She is advised reasons to hold off on starting diuretic at this time , she verbalizes understanding . She  is aware if symptoms persist to call

## 2021-07-22 ENCOUNTER — OFFICE VISIT (OUTPATIENT)
Dept: CARDIOLOGY | Facility: CLINIC | Age: 46
End: 2021-07-22

## 2021-07-22 VITALS
HEART RATE: 106 BPM | BODY MASS INDEX: 32.68 KG/M2 | DIASTOLIC BLOOD PRESSURE: 62 MMHG | WEIGHT: 191.4 LBS | HEIGHT: 64 IN | SYSTOLIC BLOOD PRESSURE: 116 MMHG

## 2021-07-22 DIAGNOSIS — R07.89 CHEST TIGHTNESS: ICD-10-CM

## 2021-07-22 DIAGNOSIS — R00.2 PALPITATIONS: ICD-10-CM

## 2021-07-22 DIAGNOSIS — R42 DIZZINESS: ICD-10-CM

## 2021-07-22 DIAGNOSIS — I49.5 TACHY-BRADY SYNDROME (HCC): ICD-10-CM

## 2021-07-22 DIAGNOSIS — I47.1 PSVT (PAROXYSMAL SUPRAVENTRICULAR TACHYCARDIA) (HCC): Primary | ICD-10-CM

## 2021-07-22 DIAGNOSIS — G90.A POTS (POSTURAL ORTHOSTATIC TACHYCARDIA SYNDROME): ICD-10-CM

## 2021-07-22 DIAGNOSIS — R00.0 TACHYCARDIA: ICD-10-CM

## 2021-07-22 PROCEDURE — 99214 OFFICE O/P EST MOD 30 MIN: CPT | Performed by: NURSE PRACTITIONER

## 2021-07-22 RX ORDER — DILTIAZEM HYDROCHLORIDE 60 MG/1
CAPSULE, EXTENDED RELEASE ORAL
Qty: 60 CAPSULE | Refills: 3 | Status: SHIPPED | OUTPATIENT
Start: 2021-07-22 | End: 2021-09-09 | Stop reason: SDUPTHER

## 2021-07-22 NOTE — PROGRESS NOTES
Chief Complaint   Patient presents with   • Follow-up     Cardiac managment. Had Ablation Febreuary 2021 at [facility]   • LABS     Had labs January 2021   • Chest Pain     Reports having stabbing pain and at times has achy pain in mid chest . Occurs usually with exertion   • Med Refill     No refills needed today.  Had medication list today       Subjective       Dolores Dejesus is a 45 y.o. female with anxiety, migraines, palpitations, POTS, and syncope.  She was initially referred in 2017 for syncope and palpitations.   She underwent multiple investigations including echocardiogram, Holter monitor, stress test, and a Tilt table.  She had normal LV systolic function, mild valvular regurgitation and no ischemia.  The tilt table was positive for hypotension and near syncopal episode. Symptoms persisted and MCOT ordered. Results showed an average heart rate of 82 with no arrythmia and several instances of sinus tachycardia for which patient was symptomatic.  She was advised to add metoprolol prn but became symptomatic with medication. It was then changed to CCB (diltiazem 30mg)  to be used prn if palpitations should occur. Carotid US was negative.  In April 2020 she seen Dr. Martínez. It was noted beta-blocker and midodrine had been ineffective.  She continued to be treated with SSRI in form of Zoloft. He recommended referral to autonomic dysfunction clinic, specifically  at [facility]. She was seen at [facility] in October and underwent Extensive workup was done including normal echocardiogram, 26 day cardiac telemetry in which symptoms correlated with sinus tach, and unremarkable carotid ultrasound. Neuro cardio autonomic test panel reported no evidence of orthostatic tachycardia or orthostatic tachycardia or hypotension.   In 2021 she had documented episode of SVT while visiting [place], converted with adenosine.  At her follow-up visit with us she was referred to EP.  In February 2020 she  underwent SVT ablation at [facility].  At follow-up visit with Dr. Dee in May 2021 she was doing well maintaining sinus rhythm with occasional episodes of increased heart rate.  No medication changes were advised at that time.    Today she presents to the office for follow-up visit.  She monitors her heart rate closely at home.  According to her fitness watch trends she has spikes of tachycardia throughout the day.  At rest her heart rate is sometimes in the 120s and the highest being 140s.  With increased heart rates she feels fatigued and short of breath.  She admits to routine activities such as housework causing the symptoms.      Cardiac History:    Past Surgical History:   Procedure Laterality Date   • CARDIOVASCULAR STRESS TEST  08/21/2018    R.Stress- 6 Min. 46 secs. 7.0 METS. 76% THR. BP- 121/73. Pt had CP. Inconclusive test   • CARDIOVASCULAR STRESS TEST  10/17/2018    L. Cardiolite- Negative.   • CONVERTED (HISTORICAL) HOLTER  06/01/2017    @[facility].AVG HR 98 BPM   • ECHO - CONVERTED  06/14/2017    @[facility]. EF 60%. Mild AI   • ECHO - CONVERTED  06/22/2017    EF 60%. MIld- Mod AI. No Shunt   • ECHO - CONVERTED  08/21/2018    EF 55%. Mild AI & MR. RVSP- 26 mmHg.   • ECHO - CONVERTED  08/03/2020    @ [facility]. EF 60%. Mild MR & AI. No AS. RVSP- 26 mmHg   • OTHER SURGICAL HISTORY  07/10/2017    Extended Monitor- AVG HR 81 BPM. [place] an Syncope with no EKG changes   • OTHER SURGICAL HISTORY  09/18/2018    Tilt Table- Positive.   • OTHER SURGICAL HISTORY  05/09/2019    MCOT:  High , low 51, AVG 82, sinus noted, rare PVC, no arrythymia   • US CAROTID UNILATERAL  04/09/2019    Jensen:  No flow limiting stenosis bilaterally       Current Outpatient Medications   Medication Sig Dispense Refill   • butalbital-acetaminophen-caffeine (FIORICET) -40 MG per tablet Take 1 tablet by mouth Every 4 (Four) Hours As Needed for Headache.     • cyclobenzaprine (FLEXERIL) 10 MG tablet 2 tablets daily     • diazePAM  (VALIUM) 5 MG tablet Take 5 mg by mouth Daily.     • Erenumab-aooe (AIMOVIG, 140 MG DOSE, SC) Inject 1 dose under the skin into the appropriate area as directed Every 30 (Thirty) Days.     • estradiol (CLIMARA) 0.1 MG/24HR patch Place 1 patch on the skin as directed by provider 2 (Two) Times a Week.     • fluticasone (FLONASE) 50 MCG/ACT nasal spray 2 sprays into the nostril(s) as directed by provider Daily.     • Loratadine (Claritin) 10 MG capsule Take  by mouth.     • meloxicam (MOBIC) 15 MG tablet Take 15 mg by mouth Daily.     • methocarbamol (ROBAXIN) 750 MG tablet Take 750 mg by mouth 3 (Three) Times a Day.     • NON FORMULARY Epidural-Bupivicane-lidocaine injections every 3 months     • OnabotulinumtoxinA (BOTOX IJ) Inject  as directed Every 3 (Three) Months.     • ondansetron (ZOFRAN) 4 MG tablet Take 4 mg by mouth Every 8 (Eight) Hours As Needed.     • sertraline (ZOLOFT) 100 MG tablet Take 100 mg by mouth 2 (Two) Times a Day.     • topiramate (TOPAMAX) 50 MG tablet 1 1/2 tablets twice a day     • colesevelam (WELCHOL) 625 MG tablet Take 1,250 mg by mouth.     • dilTIAZem SR (CARDIZEM SR) 60 MG 12 hr capsule Once daily with extra tablet if needed for increased heart rate 60 capsule 3     No current facility-administered medications for this visit.       Carbamates and Carisoprodol    Past Medical History:   Diagnosis Date   • Anxiety    • Asthma    • Chronic kidney disease     hx of kidney stones   • Depression    • History of ear surgery    • Hx of hysterectomy 06/12/2018   • Hypoglycemia    • Migraines    • Monoclonal (M) protein disease, 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   • S/P ACL surgery    • S/P removal of ovarian cyst        Social History     Socioeconomic History   • Marital status:      Spouse name: Not on file   • Number of children: Not on file   • Years of education: Not on file   • Highest education level: Not on file   Tobacco Use   • Smoking status: Never Smoker   • Smokeless tobacco:  "Never Used   Vaping Use   • Vaping Use: Never used   Substance and Sexual Activity   • Alcohol use: Yes     Comment: occasional   • Drug use: No       Family History   Problem Relation Age of Onset   • Diabetes Mother    • Arthritis Father    • Breast cancer Sister    • No Known Problems Sister    • No Known Problems Sister    • Heart disease Son    • Heart attack Son    • Autism Son        Review of Systems   Constitutional: Positive for diaphoresis, malaise/fatigue and weight gain. Negative for fever.   Cardiovascular: Positive for chest pain, dyspnea on exertion, leg swelling, near-syncope and palpitations.   Respiratory: Positive for shortness of breath.    Endocrine: Negative for polydipsia, polyphagia and polyuria.   Hematologic/Lymphatic: Negative for bleeding problem.   Skin: Negative for color change.   Musculoskeletal: Positive for falls and myalgias.   Gastrointestinal: Positive for change in bowel habit, constipation and diarrhea. Negative for melena.   Genitourinary: Negative for hematuria.   Neurological: Positive for dizziness and headaches.   Psychiatric/Behavioral: Negative for suicidal ideas. The patient is nervous/anxious.         BP Readings from Last 5 Encounters:   07/22/21 116/62   05/05/21 116/84   01/21/21 114/82   04/24/20 102/85   09/30/19 120/80       Wt Readings from Last 5 Encounters:   07/22/21 86.8 kg (191 lb 6.4 oz)   05/05/21 78.9 kg (174 lb)   01/21/21 83.5 kg (184 lb)   04/24/20 82.6 kg (182 lb)   09/30/19 80.3 kg (177 lb)       Objective     /62 (BP Location: Left arm)   Pulse 106   Ht 162.6 cm (64\")   Wt 86.8 kg (191 lb 6.4 oz)   BMI 32.85 kg/m²     Vitals and nursing note reviewed.   Eyes:      Pupils: Pupils are equal, round, and reactive to light.   HENT:      Head: Normocephalic.   Pulmonary:      Breath sounds: Normal breath sounds.   Cardiovascular:      Normal rate. Regular rhythm.      Murmurs: There is a grade 2/6 systolic murmur.   Abdominal:      General: " Bowel sounds are normal.      Palpations: Abdomen is soft.   Musculoskeletal: Normal range of motion.      Cervical back: Normal range of motion. Skin:     General: Skin is warm.   Neurological:      Mental Status: Alert and oriented to person, place, and time.            ECG 12 Lead    Date/Time: 7/23/2021 5:30 PM  Performed by: Kenna Harvey APRN  Authorized by: Kenna Harvey APRN   Comparison: compared with previous ECG from 1/22/2021  Similar to previous ECG  Comparison to previous ECG: Previous sinus 94 bpm  Rhythm: sinus tachycardia  BPM: 106                   Assessment/Plan   Diagnoses and all orders for this visit:    1. PSVT (paroxysmal supraventricular tachycardia) (CMS/HCC) (Primary)    2. Tachycardia  -     dilTIAZem SR (CARDIZEM SR) 60 MG 12 hr capsule; Once daily with extra tablet if needed for increased heart rate  Dispense: 60 capsule; Refill: 3    3. Palpitations  -     dilTIAZem SR (CARDIZEM SR) 60 MG 12 hr capsule; Once daily with extra tablet if needed for increased heart rate  Dispense: 60 capsule; Refill: 3    4. POTS (postural orthostatic tachycardia syndrome)    5. Dizziness  -     dilTIAZem SR (CARDIZEM SR) 60 MG 12 hr capsule; Once daily with extra tablet if needed for increased heart rate  Dispense: 60 capsule; Refill: 3    6. Chest tightness    7. Tachy-semaj syndrome (CMS/HCC)  -     dilTIAZem SR (CARDIZEM SR) 60 MG 12 hr capsule; Once daily with extra tablet if needed for increased heart rate  Dispense: 60 capsule; Refill: 3    Other orders  -     ECG 12 Lead    Patient presents today with concern over persistent episodes of increased heart rate.  The tachycardia is interfering with her normal daily activities including light housework and minor activities with her children.  She maintains good hydration and avoid caffeine.  She admits to episodes of lightheadedness and recently passed out in the middle of the night after going to the bathroom.  At home she had a leftover  prescription of Diltiazem and took a couple of doses on day she was most symptomatic and felt there was some benefit from the medication.  We will try adding back low-dose Diltiazem.  She will monitor blood pressure and heart rate over the next week or 2 and call back to the office to report how she is doing.  If symptoms persist or she has side effects of the medication we will proceed with placing cardiac monitor.  We will consider repeating labs.  I would advise that she follow-up with EP sooner appointment.    She agrees to call back in week or two. If symptoms persist then cardiac monitor will be ordered.     Patient's Body mass index is 32.85 kg/m². indicating that she is obese (BMI >30). Obesity-related health conditions include the following: none. Obesity is worsening. BMI is is above average; BMI management plan is completed. We discussed low calorie, low carb based diet program and portion control..     A 3-month follow-up visit scheduled.  Please call sooner for chronic concerns.           Electronically signed by GIDEON Vallejo,  July 23, 2021 17:32 EDT

## 2021-07-23 PROCEDURE — 93000 ELECTROCARDIOGRAM COMPLETE: CPT | Performed by: NURSE PRACTITIONER

## 2021-07-30 ENCOUNTER — TELEPHONE (OUTPATIENT)
Dept: CARDIOLOGY | Facility: CLINIC | Age: 46
End: 2021-07-30

## 2021-07-30 DIAGNOSIS — R42 DIZZINESS: ICD-10-CM

## 2021-07-30 DIAGNOSIS — R53.83 OTHER FATIGUE: ICD-10-CM

## 2021-07-30 DIAGNOSIS — R00.2 PALPITATIONS: ICD-10-CM

## 2021-07-30 DIAGNOSIS — R07.89 CHEST TIGHTNESS: ICD-10-CM

## 2021-07-30 DIAGNOSIS — I47.1 PSVT (PAROXYSMAL SUPRAVENTRICULAR TACHYCARDIA) (HCC): Primary | ICD-10-CM

## 2021-07-30 DIAGNOSIS — R00.0 TACHYCARDIA: ICD-10-CM

## 2021-07-30 NOTE — TELEPHONE ENCOUNTER
Recommend wear Holter monitor. Order place to be worn one week. Once results in she may need medication change and/or sooner follow up with EP.

## 2021-07-30 NOTE — TELEPHONE ENCOUNTER
Received a call from patient stating that her heart rate continues to stay in the 100's especially midday, continues with being lightheaded after medication change.

## 2021-08-17 ENCOUNTER — APPOINTMENT (OUTPATIENT)
Dept: WOMENS IMAGING | Facility: HOSPITAL | Age: 46
End: 2021-08-17

## 2021-08-17 PROCEDURE — 77067 SCR MAMMO BI INCL CAD: CPT | Performed by: RADIOLOGY

## 2021-08-17 PROCEDURE — 77063 BREAST TOMOSYNTHESIS BI: CPT | Performed by: RADIOLOGY

## 2021-08-30 ENCOUNTER — TELEPHONE (OUTPATIENT)
Dept: CARDIOLOGY | Facility: CLINIC | Age: 46
End: 2021-08-30

## 2021-08-30 NOTE — TELEPHONE ENCOUNTER
"Patient called to ask if you would review her monitor scanned in from Dr. Pennington's office. She states she has been told their office can't help her any further with her tachycardia. I explained that her monitor only showed one episode of tachycardia. She states the week she wore it she was in bed 3 days with a migraine and it was not a good representation. She states her HR is in the \"140s all the time doing light housework\" according to her watch.  "

## 2021-08-30 NOTE — TELEPHONE ENCOUNTER
Please call back.  Her monitor was fine.  Mainly sinus rhythm and sinus tachycardia.  If she continues to have problems we can try Corlanor 5 mg p.o. twice daily for her sinus tachycardia.

## 2021-09-09 DIAGNOSIS — R42 DIZZINESS: ICD-10-CM

## 2021-09-09 DIAGNOSIS — I49.5 TACHY-BRADY SYNDROME (HCC): ICD-10-CM

## 2021-09-09 DIAGNOSIS — R00.2 PALPITATIONS: ICD-10-CM

## 2021-09-09 DIAGNOSIS — R00.0 TACHYCARDIA: ICD-10-CM

## 2021-09-10 RX ORDER — DILTIAZEM HYDROCHLORIDE 60 MG/1
CAPSULE, EXTENDED RELEASE ORAL
Qty: 180 CAPSULE | Refills: 3 | Status: SHIPPED | OUTPATIENT
Start: 2021-09-10 | End: 2021-12-16 | Stop reason: SDUPTHER

## 2021-09-10 NOTE — TELEPHONE ENCOUNTER
Please inform patient I have [facility]. It appears EP is trying to get PA on Corlanor. Please make sure patient clarifies with Dr. Dee if he wants her to continue ditiazem if she starts Corlanor. Thank you.

## 2021-09-15 NOTE — TELEPHONE ENCOUNTER
I spoke with patient  about medication, she  said she had received Corlanor  and had been taking it.  She did not have instructions to discontinue Diltiazem at  this time .

## 2021-09-28 ENCOUNTER — TELEPHONE (OUTPATIENT)
Dept: CARDIOLOGY | Facility: CLINIC | Age: 46
End: 2021-09-28

## 2021-09-28 NOTE — TELEPHONE ENCOUNTER
Patient called to ask if she needs to keep her appointment with you  10-  .She is now following with  Dr. Watson  .

## 2021-09-29 NOTE — TELEPHONE ENCOUNTER
Left message on voicemail  with information about appointment in office with Cardiology , she is scheduled with EP .  She is advised to call us if needed .

## 2021-09-29 NOTE — TELEPHONE ENCOUNTER
She can cancel her appointment since she is following with EP. We can change her appointment status to prn if she is okay with that.

## 2021-12-16 ENCOUNTER — OFFICE VISIT (OUTPATIENT)
Dept: CARDIOLOGY | Facility: CLINIC | Age: 46
End: 2021-12-16

## 2021-12-16 VITALS
DIASTOLIC BLOOD PRESSURE: 84 MMHG | SYSTOLIC BLOOD PRESSURE: 124 MMHG | TEMPERATURE: 96.9 F | BODY MASS INDEX: 34.15 KG/M2 | HEART RATE: 100 BPM | HEIGHT: 64 IN | WEIGHT: 200 LBS

## 2021-12-16 DIAGNOSIS — M25.50 MULTIPLE JOINT PAIN: ICD-10-CM

## 2021-12-16 DIAGNOSIS — F41.9 ANXIETY: ICD-10-CM

## 2021-12-16 DIAGNOSIS — R42 DIZZINESS: ICD-10-CM

## 2021-12-16 DIAGNOSIS — Z86.69 HX OF MIGRAINE HEADACHES: ICD-10-CM

## 2021-12-16 DIAGNOSIS — R00.0 TACHYCARDIA: ICD-10-CM

## 2021-12-16 DIAGNOSIS — R53.83 OTHER FATIGUE: Primary | ICD-10-CM

## 2021-12-16 DIAGNOSIS — I49.5 TACHY-BRADY SYNDROME (HCC): ICD-10-CM

## 2021-12-16 DIAGNOSIS — R63.5 WEIGHT GAIN: ICD-10-CM

## 2021-12-16 DIAGNOSIS — R00.2 PALPITATIONS: ICD-10-CM

## 2021-12-16 DIAGNOSIS — E66.2 CLASS 1 OBESITY WITH ALVEOLAR HYPOVENTILATION, SERIOUS COMORBIDITY, AND BODY MASS INDEX (BMI) OF 30.0 TO 30.9 IN ADULT (HCC): ICD-10-CM

## 2021-12-16 DIAGNOSIS — G90.A POTS (POSTURAL ORTHOSTATIC TACHYCARDIA SYNDROME): ICD-10-CM

## 2021-12-16 PROCEDURE — 99214 OFFICE O/P EST MOD 30 MIN: CPT | Performed by: NURSE PRACTITIONER

## 2021-12-16 RX ORDER — DILTIAZEM HYDROCHLORIDE 60 MG/1
CAPSULE, EXTENDED RELEASE ORAL
Qty: 180 CAPSULE | Refills: 3 | Status: SHIPPED | OUTPATIENT
Start: 2021-12-16 | End: 2023-02-23 | Stop reason: SDUPTHER

## 2021-12-16 RX ORDER — DILTIAZEM HYDROCHLORIDE 60 MG/1
CAPSULE, EXTENDED RELEASE ORAL
Qty: 180 CAPSULE | Refills: 3 | Status: SHIPPED | OUTPATIENT
Start: 2021-12-16 | End: 2021-12-16 | Stop reason: SDUPTHER

## 2021-12-16 RX ORDER — TRAZODONE HYDROCHLORIDE 50 MG/1
50 TABLET ORAL NIGHTLY
COMMUNITY

## 2021-12-16 NOTE — PROGRESS NOTES
Chief Complaint   Patient presents with   • Follow-up     Pt is here for cardiac follow up.  Pt reports CP, dizziness & palpitations, but denies SOB.  Pt does not take a daily ASA.   • Med Refill     Pt request 90 day refills [facility]   • Lab Work     Pt reports her last labs were this week with her PCP.   • Palpitations     Pt reports that her palpitaitons last about 10 minutes, sometimes a little long.  Pt states that excertions causes them-.   • Chest Pain     Pt reports that anytime her HR gets >110 she experiences CP.   • Dizziness     Pt states she gets dizzy with CP, if the CP is prolonged.       Cardiac Complaints  Dizziness and palpitations      Subjective   Dolores Dejesus is a 46 y.o. female with anxiety, migraines, palpitations, POTS, and syncope.  She was initially referred in 2017 for syncope and palpitations.   She underwent multiple investigations including echocardiogram, Holter monitor, stress test, and a Tilt table.  She had normal LV systolic function, mild valvular regurgitation and no ischemia.  The tilt table was positive for hypotension and near syncopal episode. Symptoms persisted and MCOT ordered. Results showed an average heart rate of 82 with no arrythmia and several instances of sinus tachycardia for which patient was symptomatic.  She was advised to add metoprolol prn but became symptomatic with medication. It was then changed to CCB (diltiazem 30mg)  to be used prn if palpitations should occur. Carotid US was negative.  In April 2020 she seen Dr. Martínez. It was noted beta-blocker and midodrine had been ineffective.  She continued to be treated with SSRI in form of Zoloft. He recommended referral to autonomic dysfunction clinic, specifically  at [facility]. She was seen at [facility] in October and underwent Extensive workup was done including normal echocardiogram, 26 day cardiac telemetry in which symptoms correlated with sinus tach, and  unremarkable carotid ultrasound. Neuro cardio autonomic test panel reported no evidence of orthostatic tachycardia or orthostatic tachycardia or hypotension.   In 2021 she had documented episode of SVT while visiting [place], converted with adenosine.  At her follow-up visit with us she was referred to EP.  In February 2020 she underwent SVT ablation at [facility].  At follow-up visit with Dr. Dee in May 2021 she was doing well maintaining sinus rhythm with occasional episodes of increased heart rate.  No medication changes were advised at that time. In July 2021 more palpitations reported and elevated HR, holter advised, baseline tach with HR of 100 noted, only rare PAC/PVC noted. She was encouraged to continue with current dosing of CCB and follow with EP.     She comes today with continued palpitations and elevated HR. Most recent appt with Leila for October was cancelled. She will follow again in May. She admits that her palpitations often last for several minutes, usually around 10 minutes. She admits that this has been ongoing.  She admits to chest pain with the palpitations. With her HR, she states she is often too tired and fatigued to do much of anything. She has a difficult time cleaning or doing anything around the home. She was advised on new medication per EP in August for palpitation management, but it appears, it was a lower dosing than cardizem she was taking with you. She does have follow up in May with EP. She does have some pain in the RUQ area and admits that her get Labs recently done with PCP, no current available. She has not gotten results from recent labs, but admits full panel done. She is to have gallbladder scan soon. Refills requested.           Cardiac History  Past Surgical History:   Procedure Laterality Date   • ABLATION OF DYSRHYTHMIC FOCUS  02/2021    [facility]   • CARDIOVASCULAR STRESS TEST  08/21/2018    R.Stress- 6 Min. 46 secs. 7.0 METS. 76% THR. BP-  121/73. Pt had CP. Inconclusive test   • CARDIOVASCULAR STRESS TEST  10/17/2018    L. Cardiolite- Negative.   • CONVERTED (HISTORICAL) HOLTER  06/01/2017    @[facility].AVG HR 98 BPM   • CONVERTED (HISTORICAL) HOLTER  08/25/2021    <8 Days. .. 1 run of SVT   • CONVERTED (HISTORICAL) HOLTER  07/30/2021    Baseline sinus tach, AVG , current advised, follow with EP   • ECHO - CONVERTED  06/14/2017    @[facility]. EF 60%. Mild AI   • ECHO - CONVERTED  06/22/2017    EF 60%. MIld- Mod AI. No Shunt   • ECHO - CONVERTED  08/21/2018    EF 55%. Mild AI & MR. RVSP- 26 mmHg.   • ECHO - CONVERTED  08/03/2020    @ [facility]. EF 60%. Mild MR & AI. No AS. RVSP- 26 mmHg   • OTHER SURGICAL HISTORY  07/10/2017    Extended Monitor- AVG HR 81 BPM. [place] an Syncope with no EKG changes   • OTHER SURGICAL HISTORY  09/18/2018    Tilt Table- Positive.   • OTHER SURGICAL HISTORY  05/09/2019    MCOT:  High , low 51, AVG 82, sinus noted, rare PVC, no arrythymia   • US CAROTID UNILATERAL  04/09/2019    Jensen:  No flow limiting stenosis bilaterally       Current Outpatient Medications   Medication Sig Dispense Refill   • butalbital-acetaminophen-caffeine (FIORICET) -40 MG per tablet Take 1 tablet by mouth Every 4 (Four) Hours As Needed for Headache.     • cyclobenzaprine (FLEXERIL) 10 MG tablet 2 tablets daily     • diazePAM (VALIUM) 5 MG tablet Take 5 mg by mouth Daily.     • diclofenac (VOLTAREN) 50 MG EC tablet Take 50 mg by mouth 2 (Two) Times a Day.     • Diclofenac Sodium (VOLTAREN) 1 % gel gel Apply 4 g topically to the appropriate area as directed Daily.     • dilTIAZem SR (CARDIZEM SR) 60 MG 12 hr capsule Once daily with extra tablet if needed for increased heart rate 180 capsule 3   • Erenumab-aooe (AIMOVIG, 140 MG DOSE, SC) Inject 1 dose under the skin into the appropriate area as directed Every 30 (Thirty) Days.     • estradiol (CLIMARA) 0.1 MG/24HR patch Place 1 patch on the skin as directed by provider 2 (Two) Times a Week.      • fluticasone (FLONASE) 50 MCG/ACT nasal spray 2 sprays into the nostril(s) as directed by provider Daily.     • Loratadine (Claritin) 10 MG capsule Take  by mouth.     • methocarbamol (ROBAXIN) 750 MG tablet Take 750 mg by mouth Every 4 (Four) Hours.     • NON FORMULARY Epidural-Bupivicane-lidocaine injections every 3 months     • OnabotulinumtoxinA (BOTOX IJ) Inject  as directed Every 3 (Three) Months.     • ondansetron (ZOFRAN) 4 MG tablet Take 4 mg by mouth Every 8 (Eight) Hours As Needed.     • sertraline (ZOLOFT) 100 MG tablet Take 100 mg by mouth 2 (Two) Times a Day.     • topiramate (TOPAMAX) 50 MG tablet 1 1/2 tablets twice a day     • traZODone (DESYREL) 50 MG tablet Take 50 mg by mouth Every Night.     • colesevelam (WELCHOL) 625 MG tablet Take 1,250 mg by mouth.       No current facility-administered medications for this visit.       Carbamates and Carisoprodol    Past Medical History:   Diagnosis Date   • Anxiety    • Asthma    • Chronic kidney disease     hx of kidney stones   • Depression    • History of ear surgery    • Hx of hysterectomy 06/12/2018   • Hypoglycemia    • Migraines    • Monoclonal (M) protein disease, 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   • S/P ACL surgery    • S/P removal of ovarian cyst        Social History     Socioeconomic History   • Marital status:    Tobacco Use   • Smoking status: Never Smoker   • Smokeless tobacco: Never Used   Vaping Use   • Vaping Use: Never used   Substance and Sexual Activity   • Alcohol use: Yes     Comment: occasional   • Drug use: No       Family History   Problem Relation Age of Onset   • Diabetes Mother    • Arthritis Father    • Breast cancer Sister    • No Known Problems Sister    • No Known Problems Sister    • Heart disease Son    • Heart attack Son    • Autism Son        Review of Systems   Constitutional: Positive for malaise/fatigue. Negative for night sweats.   Cardiovascular: Positive for chest pain and palpitations. Negative for  "claudication, dyspnea on exertion, irregular heartbeat, leg swelling, near-syncope, orthopnea and syncope.   Respiratory: Negative for cough, shortness of breath and wheezing.    Musculoskeletal: Positive for stiffness. Negative for back pain and joint pain.   Gastrointestinal: Negative for anorexia, heartburn, melena, nausea and vomiting.   Genitourinary: Negative for dysuria, hematuria, hesitancy and nocturia.   Neurological: Positive for dizziness. Negative for light-headedness and loss of balance.   Psychiatric/Behavioral: Negative for depression and memory loss. The patient is nervous/anxious.            Objective     /84 (BP Location: Left arm, Patient Position: Sitting)   Pulse 100   Temp 96.9 °F (36.1 °C)   Ht 162.6 cm (64\")   Wt 90.7 kg (200 lb)   BMI 34.33 kg/m²     Constitutional:       Appearance: Not in distress.   Eyes:      Pupils: Pupils are equal, round, and reactive to light.   HENT:      Nose: Nose normal.   Pulmonary:      Effort: Pulmonary effort is normal.      Breath sounds: Normal breath sounds.   Cardiovascular:      PMI at left midclavicular line. Tachycardia present. Regular rhythm.      Murmurs: There is a systolic murmur.   Abdominal:      Palpations: Abdomen is soft.   Musculoskeletal: Normal range of motion.      Cervical back: Normal range of motion and neck supple. Skin:     General: Skin is warm and dry.   Neurological:      Mental Status: Oriented to person, place and time.         Procedures    Assessment/Plan     Palpitations/Tachycardia/POTS:  Reported today, similar to prior. Tachycardia noted. She brings log that shows elevation as high as 140. BP not too low. She does report Tomassoni changed her cardizem to lower dosing? Than she was on prior. She was urged to begin her cardizem back at 60mg in AM with extra 30mg in PM. Limited caffeine advised as well as adequate fluid intake. Most recent holter report reviewed with patient with sinus tach/only 6 beat run of PSVT " seen.     Chest discomfort:  Noted with palpitations. She also admits to RUQ pain, being evaluated by your office, has a gallbladder study soon. Repeat stress advised. Will be done as lexiscan as she admits too much fatigue, SOA, and inability to do much activity due to joint pain and fatigue. This will be done to rule out ischemic burden. More recommendations to follow.     Anxiety:  Using valium and zoloft in regards.     Labs including TSH recently done with your office. Can we have for review?      BMI noted at 34.33, good cardiac diet with limited caffeine intake advised. Adequate fluid intake advised.     Refills per request.    6 month follow up advised or sooner if needed.           Problems Addressed this Visit        Cardiac and Vasculature    Tachycardia    Relevant Medications    dilTIAZem SR (CARDIZEM SR) 60 MG 12 hr capsule    Other Relevant Orders    Stress Test With Myocardial Perfusion One Day    POTS (postural orthostatic tachycardia syndrome)    Palpitations    Relevant Medications    dilTIAZem SR (CARDIZEM SR) 60 MG 12 hr capsule    Other Relevant Orders    Stress Test With Myocardial Perfusion One Day       Neuro    Hx of migraine headaches       Symptoms and Signs    Dizziness    Relevant Medications    dilTIAZem SR (CARDIZEM SR) 60 MG 12 hr capsule    Other Relevant Orders    Stress Test With Myocardial Perfusion One Day      Other Visit Diagnoses     Other fatigue    -  Primary    Relevant Orders    Stress Test With Myocardial Perfusion One Day    Tachy-semaj syndrome (HCC)        Relevant Medications    dilTIAZem SR (CARDIZEM SR) 60 MG 12 hr capsule    Other Relevant Orders    Stress Test With Myocardial Perfusion One Day    Multiple joint pain        Relevant Orders    Stress Test With Myocardial Perfusion One Day    Anxiety        Weight gain        Class 1 obesity with alveolar hypoventilation, serious comorbidity, and body mass index (BMI) of 30.0 to 30.9 in adult ([facility])           Diagnoses       Codes Comments    Other fatigue    -  Primary ICD-10-CM: R53.83  ICD-9-CM: 780.79     Tachycardia     ICD-10-CM: R00.0  ICD-9-CM: 785.0     Palpitations     ICD-10-CM: R00.2  ICD-9-CM: 785.1     Dizziness     ICD-10-CM: R42  ICD-9-CM: 780.4     Tachy-semaj syndrome (HCC)     ICD-10-CM: I49.5  ICD-9-CM: 427.81     POTS (postural orthostatic tachycardia syndrome)     ICD-10-CM: I49.8  ICD-9-CM: 427.89     Multiple joint pain     ICD-10-CM: M25.50  ICD-9-CM: 719.49     Hx of migraine headaches     ICD-10-CM: Z86.69  ICD-9-CM: V12.49     Anxiety     ICD-10-CM: F41.9  ICD-9-CM: 300.00     Weight gain     ICD-10-CM: R63.5  ICD-9-CM: 783.1     Class 1 obesity with alveolar hypoventilation, serious comorbidity, and body mass index (BMI) of 30.0 to 30.9 in adult (HCC)     ICD-10-CM: E66.2, Z68.30  ICD-9-CM: 278.03, V85.30           Patient's Body mass index is 34.33 kg/m². indicating that she is           Electronically signed by Clarisa Stephens, APRN December 16, 2021 16:58 EST

## 2021-12-20 ENCOUNTER — TELEPHONE (OUTPATIENT)
Dept: CARDIOLOGY | Facility: CLINIC | Age: 46
End: 2021-12-20

## 2022-01-03 ENCOUNTER — TELEPHONE (OUTPATIENT)
Dept: CARDIOLOGY | Facility: CLINIC | Age: 47
End: 2022-01-03

## 2022-01-03 NOTE — TELEPHONE ENCOUNTER
Received fax from Dr. Bertrand Ch for cardiac clearance for patient to have a laparoscopic cholecystectomy. According to our records, I do not see where patient is on any blood thinners or has had any stenting.         Fax [phone]

## 2022-01-25 ENCOUNTER — HOSPITAL ENCOUNTER (OUTPATIENT)
Dept: CARDIOLOGY | Facility: HOSPITAL | Age: 47
Discharge: HOME | End: 2022-01-25

## 2022-01-25 DIAGNOSIS — R53.83 OTHER FATIGUE: ICD-10-CM

## 2022-01-25 DIAGNOSIS — M25.50 MULTIPLE JOINT PAIN: ICD-10-CM

## 2022-01-25 DIAGNOSIS — I49.5 TACHY-BRADY SYNDROME: ICD-10-CM

## 2022-01-25 DIAGNOSIS — R00.2 PALPITATIONS: ICD-10-CM

## 2022-01-25 DIAGNOSIS — R00.0 TACHYCARDIA: ICD-10-CM

## 2022-01-25 DIAGNOSIS — R42 DIZZINESS: ICD-10-CM

## 2022-01-25 LAB
BH CV REST NUCLEAR ISOTOPE DOSE: 10 MCI
BH CV STRESS COMMENTS STAGE 1: NORMAL
BH CV STRESS DOSE REGADENOSON STAGE 1: 0.4
BH CV STRESS DURATION MIN STAGE 1: 0
BH CV STRESS DURATION SEC STAGE 1: 10
BH CV STRESS NUCLEAR ISOTOPE DOSE: 30 MCI
BH CV STRESS PROTOCOL 1: NORMAL
BH CV STRESS RECOVERY BP: NORMAL MMHG
BH CV STRESS RECOVERY HR: 95 BPM
BH CV STRESS STAGE 1: 1
LV EF NUC BP: 78 %
MAXIMAL PREDICTED HEART RATE: 174 BPM
PERCENT MAX PREDICTED HR: 59.2 %
STRESS BASELINE BP: NORMAL MMHG
STRESS BASELINE HR: 90 BPM
STRESS PERCENT HR: 70 %
STRESS POST PEAK BP: NORMAL MMHG
STRESS POST PEAK HR: 103 BPM
STRESS TARGET HR: 148 BPM

## 2022-01-25 PROCEDURE — 0 TECHNETIUM SESTAMIBI: Performed by: INTERNAL MEDICINE

## 2022-01-25 PROCEDURE — 25010000002 REGADENOSON 0.4 MG/5ML SOLUTION: Performed by: INTERNAL MEDICINE

## 2022-01-25 PROCEDURE — A9500 TC99M SESTAMIBI: HCPCS | Performed by: INTERNAL MEDICINE

## 2022-01-25 PROCEDURE — 93017 CV STRESS TEST TRACING ONLY: CPT

## 2022-01-25 PROCEDURE — 93018 CV STRESS TEST I&R ONLY: CPT | Performed by: INTERNAL MEDICINE

## 2022-01-25 PROCEDURE — 78452 HT MUSCLE IMAGE SPECT MULT: CPT

## 2022-01-25 PROCEDURE — 78452 HT MUSCLE IMAGE SPECT MULT: CPT | Performed by: INTERNAL MEDICINE

## 2022-01-25 RX ADMIN — REGADENOSON 0.4 MG: 0.08 INJECTION, SOLUTION INTRAVENOUS at 11:54

## 2022-01-25 RX ADMIN — TECHNETIUM TC 99M SESTAMIBI 1 DOSE: 1 INJECTION INTRAVENOUS at 11:54

## 2022-01-25 RX ADMIN — TECHNETIUM TC 99M SESTAMIBI 1 DOSE: 1 INJECTION INTRAVENOUS at 10:05

## 2022-05-18 ENCOUNTER — OFFICE VISIT (OUTPATIENT)
Dept: CARDIOLOGY | Facility: CLINIC | Age: 47
End: 2022-05-18

## 2022-05-18 VITALS
SYSTOLIC BLOOD PRESSURE: 118 MMHG | OXYGEN SATURATION: 99 % | WEIGHT: 195 LBS | HEART RATE: 83 BPM | HEIGHT: 64 IN | DIASTOLIC BLOOD PRESSURE: 70 MMHG | BODY MASS INDEX: 33.29 KG/M2

## 2022-05-18 DIAGNOSIS — R00.0 TACHYCARDIA: ICD-10-CM

## 2022-05-18 DIAGNOSIS — I47.1 PSVT (PAROXYSMAL SUPRAVENTRICULAR TACHYCARDIA): Primary | ICD-10-CM

## 2022-05-18 DIAGNOSIS — G90.A POTS (POSTURAL ORTHOSTATIC TACHYCARDIA SYNDROME): ICD-10-CM

## 2022-05-18 PROCEDURE — 99214 OFFICE O/P EST MOD 30 MIN: CPT | Performed by: INTERNAL MEDICINE

## 2022-05-18 RX ORDER — PROMETHAZINE HYDROCHLORIDE 25 MG/1
TABLET ORAL
COMMUNITY
Start: 2022-04-14 | End: 2022-12-06

## 2022-05-18 RX ORDER — BISOPROLOL FUMARATE 5 MG/1
2.5 TABLET, FILM COATED ORAL
Qty: 30 TABLET | Refills: 6 | Status: SHIPPED | OUTPATIENT
Start: 2022-05-18

## 2022-05-18 RX ORDER — FLUDROCORTISONE ACETATE 0.1 MG/1
0.1 TABLET ORAL EVERY MORNING
Qty: 30 TABLET | Refills: 6 | Status: SHIPPED | OUTPATIENT
Start: 2022-05-18 | End: 2023-02-16

## 2022-05-18 RX ORDER — METOCLOPRAMIDE 10 MG/1
10 TABLET ORAL
COMMUNITY
Start: 2022-04-26

## 2022-05-18 RX ORDER — IVABRADINE 5 MG/1
TABLET, FILM COATED ORAL
COMMUNITY
Start: 2022-02-24 | End: 2022-05-18 | Stop reason: ALTCHOICE

## 2022-05-18 NOTE — PROGRESS NOTES
Dolores Oleg  1975  [phone]    05/18/2022    [facility] CARDIOLOGY     Referring Provider: No ref. provider found     Adin Lundy APRN  [address]  [facility] [id number]    Chief Complaint   Patient presents with   • PSVT (paroxysmal supraventricular tachycardia)       Problem List:     1. Syncope/presyncope/POTS:   a. Failed metoprolol and midodrine  b. Stress test 8/21/2018: Patient walked 6 minutes 46 seconds, rate 76% of target heart rate, patient had chest pain-inconclusive test  c. Cardiolite stress test 10/17/2018: Negative for ischemia  d. Echocardiogram 8/21/2018 EF 55% mild AI and mild MR RVSP 26 mmHg  e. Positive tilt table test 9/18/2018  f. MCOT 5/9/2019 sinus rhythm 51 to 147 bpm, average 82 bpm  g. Event Monitor 8/5- 8/13/2021: ST and NSR   h. Event Monitor 1/25/2022: EF 70%, no ischemia, low risk study.   i. Lexiscan 1/25/2022 LVEF 70%, no ischemia  2. SVT  a. SVT requiring Adenosine while in [place] 1/2021  b. EPS + RFA of SVT 2/25/2021   3. Dysfunctional uterine bleeding status post hysterectomy  4. Depression on Zoloft  5. Anxiety  6. Chronic headaches  7. Gastroparesis   8. Surgical history  a. Ear surgery  b. Hysterectomy  c. ACL surgery  d. Removal of ovarian cyst  e. Cholecystectomy 1/2022     Allergies  Allergies   Allergen Reactions   • Carbamates Hives   • Carisoprodol Hives       Current Medications    Current Outpatient Medications:   •  butalbital-acetaminophen-caffeine (FIORICET, ESGIC) -40 MG per tablet, Take 1 tablet by mouth Every 4 (Four) Hours As Needed for Headache., Disp: , Rfl:   •  cyclobenzaprine (FLEXERIL) 10 MG tablet, 2 tablets daily, Disp: , Rfl:   •  diazePAM (VALIUM) 5 MG tablet, Take 5 mg by mouth Daily., Disp: , Rfl:   •  diclofenac (VOLTAREN) 50 MG EC tablet, Take 50 mg by mouth 2 (Two) Times a Day., Disp: , Rfl:   •  Diclofenac Sodium (VOLTAREN) 1 % gel gel, Apply 4 g topically to the appropriate area as directed Daily.,  Disp: , Rfl:   •  dilTIAZem SR (CARDIZEM SR) 60 MG 12 hr capsule, Once daily with extra tablet if needed for increased heart rate, Disp: 180 capsule, Rfl: 3  •  Erenumab-aooe (AIMOVIG, 140 MG DOSE, SC), Inject 1 dose under the skin into the appropriate area as directed Every 30 (Thirty) Days., Disp: , Rfl:   •  estradiol (CLIMARA) 0.1 MG/24HR patch, Place 1 patch on the skin as directed by provider 2 (Two) Times a Week., Disp: , Rfl:   •  fluticasone (FLONASE) 50 MCG/ACT nasal spray, 2 sprays into the nostril(s) as directed by provider Daily., Disp: , Rfl:   •  Loratadine 10 MG capsule, Take  by mouth., Disp: , Rfl:   •  methocarbamol (ROBAXIN) 750 MG tablet, Take 750 mg by mouth Every 4 (Four) Hours., Disp: , Rfl:   •  metoclopramide (REGLAN) 10 MG tablet, Take 10 mg by mouth 2 (Two) Times a Day Before Meals., Disp: , Rfl:   •  NON FORMULARY, Epidural-Bupivicane-lidocaine injections every 3 months, Disp: , Rfl:   •  OnabotulinumtoxinA (BOTOX IJ), Inject  as directed Every 3 (Three) Months., Disp: , Rfl:   •  ondansetron (ZOFRAN) 4 MG tablet, Take 4 mg by mouth Every 8 (Eight) Hours As Needed., Disp: , Rfl:   •  promethazine (PHENERGAN) 25 MG tablet, TAKE 1 TABLET BY MOUTH EVERY 4-6 HOURS AS NEEDED FOR FORNAUSEA AND VOMITING, Disp: , Rfl:   •  sertraline (ZOLOFT) 100 MG tablet, Take 100 mg by mouth 2 (Two) Times a Day., Disp: , Rfl:   •  topiramate (TOPAMAX) 50 MG tablet, 1 1/2 tablets twice a day, Disp: , Rfl:   •  traZODone (DESYREL) 50 MG tablet, Take 50 mg by mouth Every Night., Disp: , Rfl:     History of Present Illness:      Pt presents for follow up of SVT, POTS. Since we last saw the pt, she had her gallbladder removed in January. She was also diagnosed with gastroparesis and is on Reglan but it is now helping her. She continues to have issues with her heart racing. She was prescribed Corlanor in August but was not able to tolerate it or afford it. She is only taking Diltiazem every other day, as she states  "that it drops her BP. She is having near syncope 2-3 times per week, for past few months and has to lay down to keep from passing out. She has warning signs of tachycardia, lightheaded, diaphoretic and hot. She drinks water all day long. She has had routine lab work a year ago to check for anemia and it was negative. She had negative stress test in 1/2022.     ROS:  General:  +  Fatigue,-  weight gain or loss  Cardiovascular:  Denies CP, PND, syncope, near syncope, edema + palpitations.  Pulmonary:  +  AGUILA, - cough, or wheezing      Vitals:    05/18/22 1419   BP: 118/70   BP Location: Left arm   Patient Position: Sitting   Pulse: 83   SpO2: 99%   Weight: 88.5 kg (195 lb)   Height: 162.6 cm (64\")     Body mass index is 33.47 kg/m².  PE:  General: NAD. A & O x 3   Neck: no JVD, no carotid bruits, no TM  Heart RRR, NL S1, S2, S4 present, no rubs, murmurs  Lungs: CTA, no wheezes, rhonchi, or rales  Abd: soft, non-tender, NL BS  Ext: No musculoskeletal deformities, no edema, cyanosis, or clubbing  Psych: normal mood and affect    Diagnostic Data:      Procedures    1. PSVT (paroxysmal supraventricular tachycardia) (HCC)    2. POTS (postural orthostatic tachycardia syndrome)    3. Tachycardia          Plan:       1. POTS:  - doing poorly, prob worsened by recent GB surgery unable to take Corlanor and cannot tolerate Diltiazem due to low BP.   - try Zebeta 2.5 mg po qPM and florinef 0.1 mg po qAM: yoga exercises, salt intake  - continue hydration     2.SVT:  - doing well s/p RFA at [facility] with no recurrences     F/up in 6 months    Scribed for Bucky Dee MD by Taya Schofield PA-C. 5/18/2022  14:56 EDT     I, Bucky Dee MD, personally performed the services described in this documentation as scribed by the above named individual in my presence, and it is both accurate and complete.  5/18/2022  14:59 EDT      "

## 2022-06-23 ENCOUNTER — OFFICE VISIT (OUTPATIENT)
Dept: CARDIOLOGY | Facility: CLINIC | Age: 47
End: 2022-06-23

## 2022-06-23 ENCOUNTER — TELEPHONE (OUTPATIENT)
Dept: CARDIOLOGY | Facility: CLINIC | Age: 47
End: 2022-06-23

## 2022-06-23 VITALS
HEIGHT: 64 IN | HEART RATE: 64 BPM | BODY MASS INDEX: 34.08 KG/M2 | SYSTOLIC BLOOD PRESSURE: 122 MMHG | DIASTOLIC BLOOD PRESSURE: 82 MMHG | WEIGHT: 199.6 LBS

## 2022-06-23 DIAGNOSIS — Z86.69 HX OF MIGRAINE HEADACHES: ICD-10-CM

## 2022-06-23 DIAGNOSIS — Z98.890 HISTORY OF CARDIAC RADIOFREQUENCY ABLATION (RFA): ICD-10-CM

## 2022-06-23 DIAGNOSIS — I47.1 PSVT (PAROXYSMAL SUPRAVENTRICULAR TACHYCARDIA): Primary | ICD-10-CM

## 2022-06-23 DIAGNOSIS — R00.2 PALPITATIONS: ICD-10-CM

## 2022-06-23 PROCEDURE — 99214 OFFICE O/P EST MOD 30 MIN: CPT | Performed by: NURSE PRACTITIONER

## 2022-06-23 RX ORDER — HYDROCODONE BITARTRATE AND ACETAMINOPHEN 5; 325 MG/1; MG/1
1 TABLET ORAL EVERY 8 HOURS PRN
COMMUNITY
End: 2022-12-06

## 2022-06-23 NOTE — PROGRESS NOTES
Chief Complaint   Patient presents with   • Follow-up     Cardiac management   • LABS     No current labs    • Palpitations     Reports she still has palpitations, have improved some but still has them.  She said fatigue has gotten worse . HR continues to fluctuate    • Med Refill     No refills needed today.        Subjective       Dolores Dejesus is a 46 y.o. female with anxiety, migraines, palpitations, POTS, and syncope.  She was initially referred in 2017 for syncope and palpitations.   She underwent multiple investigations including echocardiogram, Holter monitor, stress test, and a Tilt table.  She had normal LV systolic function, mild valvular regurgitation and no ischemia.  The tilt table was positive for hypotension and near syncopal episode. Symptoms persisted and MCOT ordered. Results showed an average heart rate of 82 with no arrythmia and several instances of sinus tachycardia for which patient was symptomatic.  She was advised to add metoprolol prn but became symptomatic with medication. It was then changed to CCB (diltiazem 30mg)  to be used prn if palpitations should occur. Carotid US was negative.  In April 2020 she seen Dr. Martínez. It was noted beta-blocker and midodrine had been ineffective.  She continued to be treated with SSRI in form of Zoloft. He recommended referral to autonomic dysfunction clinic, specifically  at [facility]. She was seen at [facility] in October and underwent extensive workup including normal echocardiogram, 26 day cardiac telemetry in which symptoms correlated with sinus tach, and unremarkable carotid ultrasound. Neuro cardio autonomic test panel reported no evidence of orthostatic tachycardia or orthostatic tachycardia or hypotension.   In 2021 she had documented episode of SVT while visiting [place], converted with adenosine.  At her follow-up visit with us she was referred to EP.  In February 2020 she underwent SVT ablation at [facility].   At follow-up visit with Dr. Dee in May 2021 she was doing well maintaining sinus rhythm with occasional episodes of increased heart rate.  No medication changes were advised at that time. In July 2021 more palpitations reported and elevated HR, holter advised, baseline tach with HR of 100 noted, only rare PAC/PVC noted. She was encouraged to continue with current dosing of CCB and follow with EP.      On 1/25/2022 she underwent Lexiscan stress test and results showed normal myocardial perfusion. She then underwent surgical removal of her gallbladder. In May 2022, she seen EP, Dr. Dee.  It was noted she was not able to take Corlanor and could not tolerate Diltiazem due to low blood pressure.  She was advised to try Zebeta at night and Florinef in the morning as well as good salt intake and yoga exercises.  She was advised to continue good hydration.  There appeared to be no recurrence of SVT status post RFA at [facility].    She also has gastroparesis and takes Reglan for management.    Today she returns for a follow up visit.  She denies new or worsening cardiac symptoms.  She notices an increase in heart rate mostly in the evenings prior to time to take beta-blocker.  She has not had to take as needed dose of Diltiazem.  [place] did not tolerate well as she became very fatigued.  She is also concerned over difficulty losing weight.    Cardiac History:    Past Surgical History:   Procedure Laterality Date   • ABLATION OF DYSRHYTHMIC FOCUS  02/2021    [facility]   • CARDIOVASCULAR STRESS TEST  08/21/2018    R.Stress- 6 Min. 46 secs. 7.0 METS. 76% THR. BP- 121/73. Pt had CP. Inconclusive test   • CARDIOVASCULAR STRESS TEST  10/17/2018    L. Cardiolite- Negative.   • CARDIOVASCULAR STRESS TEST  01/25/2022    Lexiscan- EF > 70%. Negative   • CHOLECYSTECTOMY     • CONVERTED (HISTORICAL) HOLTER  06/01/2017    @[facility].AVG HR 98 BPM   • CONVERTED (HISTORICAL)  HOLTER  08/25/2021    <8 Days. .. 1 run of SVT   • CONVERTED (HISTORICAL) HOLTER  07/30/2021    Baseline sinus tach, AVG , current advised, follow with EP   • ECHO - CONVERTED  06/14/2017    @[facility]. EF 60%. Mild AI   • ECHO - CONVERTED  06/22/2017    EF 60%. MIld- Mod AI. No Shunt   • ECHO - CONVERTED  08/21/2018    EF 55%. Mild AI & MR. RVSP- 26 mmHg.   • ECHO - CONVERTED  08/03/2020    @ [facility]. EF 60%. Mild MR & AI. No AS. RVSP- 26 mmHg   • OTHER SURGICAL HISTORY  07/10/2017    Extended Monitor- AVG HR 81 BPM. [place] an Syncope with no EKG changes   • OTHER SURGICAL HISTORY  09/18/2018    Tilt Table- Positive.   • OTHER SURGICAL HISTORY  05/09/2019    MCOT:  High , low 51, AVG 82, sinus noted, rare PVC, no arrythymia   • US CAROTID UNILATERAL  04/09/2019    [facility]:  No flow limiting stenosis bilaterally       Current Outpatient Medications   Medication Sig Dispense Refill   • bisoprolol (ZEBeta) 5 MG tablet Take 0.5 tablets by mouth every night at bedtime. 30 tablet 6   • butalbital-acetaminophen-caffeine (FIORICET, ESGIC) -40 MG per tablet Take 1 tablet by mouth Every 4 (Four) Hours As Needed for Headache.     • cyclobenzaprine (FLEXERIL) 10 MG tablet 2 tablets daily     • diazePAM (VALIUM) 5 MG tablet Take 5 mg by mouth Daily.     • diclofenac (VOLTAREN) 50 MG EC tablet Take 50 mg by mouth 2 (Two) Times a Day.     • Diclofenac Sodium (VOLTAREN) 1 % gel gel Apply 4 g topically to the appropriate area as directed Daily.     • dilTIAZem SR (CARDIZEM SR) 60 MG 12 hr capsule Once daily with extra tablet if needed for increased heart rate (Patient taking differently: Take 60 mg by mouth Daily As Needed. Once daily with extra tablet if needed for increased heart rate) 180 capsule 3   • Erenumab-aooe (AIMOVIG, 140 MG DOSE, SC) Inject 1 dose under the skin into the appropriate area as directed Every 30 (Thirty) Days.     • estradiol (CLIMARA) 0.1 MG/24HR patch Place 1 patch on the skin as directed by  provider 2 (Two) Times a Week.     • fludrocortisone 0.1 MG tablet Take 1 tablet by mouth Every Morning. 30 tablet 6   • fluticasone (FLONASE) 50 MCG/ACT nasal spray 2 sprays into the nostril(s) as directed by provider Daily.     • HYDROcodone-acetaminophen (NORCO) 5-325 MG per tablet Take 1 tablet by mouth Every 8 (Eight) Hours As Needed.     • Loratadine 10 MG capsule Take  by mouth.     • methocarbamol (ROBAXIN) 750 MG tablet Take 750 mg by mouth Every 4 (Four) Hours.     • metoclopramide (REGLAN) 10 MG tablet Take 10 mg by mouth 2 (Two) Times a Day Before Meals.     • NON FORMULARY Epidural-Bupivicane-lidocaine injections every 3 months     • OnabotulinumtoxinA (BOTOX IJ) Inject  as directed Every 3 (Three) Months.     • ondansetron (ZOFRAN) 4 MG tablet Take 4 mg by mouth Every 8 (Eight) Hours As Needed.     • promethazine (PHENERGAN) 25 MG tablet TAKE 1 TABLET BY MOUTH EVERY 4-6 HOURS AS NEEDED FOR FORNAUSEA AND VOMITING     • sertraline (ZOLOFT) 100 MG tablet Take 100 mg by mouth 2 (Two) Times a Day.     • topiramate (TOPAMAX) 50 MG tablet 1 1/2 tablets twice a day     • traZODone (DESYREL) 50 MG tablet Take 50 mg by mouth Every Night.       No current facility-administered medications for this visit.       Carbamates and Carisoprodol    Past Medical History:   Diagnosis Date   • Anxiety    • Asthma    • Chronic kidney disease     hx of kidney stones   • Depression    • History of ear surgery    • Hx of hysterectomy 06/12/2018   • Hypoglycemia    • Migraines    • Monoclonal (M) protein disease, 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   • S/P ACL surgery    • S/P removal of ovarian cyst        Social History     Socioeconomic History   • Marital status:    Tobacco Use   • Smoking status: Never Smoker   • Smokeless tobacco: Never Used   Vaping Use   • Vaping Use: Never used   Substance and Sexual Activity   • Alcohol use: Yes     Comment: occasional   • Drug use: No   • Sexual activity: Defer       Family History  "  Problem Relation Age of Onset   • Diabetes Mother    • Arthritis Father    • Breast cancer Sister    • No Known Problems Sister    • No Known Problems Sister    • Heart disease Son    • Heart attack Son    • Autism Son        Review of Systems   Constitutional: Positive for malaise/fatigue. Negative for decreased appetite, diaphoresis and fever.   HENT: Negative for nosebleeds.    Eyes: Negative for blurred vision.   Cardiovascular: Positive for palpitations (in form of increased heart rate). Negative for chest pain, claudication, cyanosis, dyspnea on exertion, irregular heartbeat, leg swelling, near-syncope, orthopnea, paroxysmal nocturnal dyspnea and syncope.   Respiratory: Negative for shortness of breath.    Endocrine: Negative for cold intolerance and heat intolerance.   Hematologic/Lymphatic: Negative for bleeding problem. Does not bruise/bleed easily.   Skin: Negative for rash.   Musculoskeletal: Positive for back pain, joint pain and stiffness. Negative for myalgias.   Gastrointestinal: Negative for heartburn, melena and nausea.   Genitourinary: Negative for dysuria and hematuria.   Neurological: Positive for headaches and light-headedness. Negative for dizziness.   Psychiatric/Behavioral: The patient does not have insomnia and is not nervous/anxious.         BP Readings from Last 5 Encounters:   06/23/22 122/82   05/18/22 118/70   12/16/21 124/84   07/22/21 116/62   05/05/21 116/84       Wt Readings from Last 5 Encounters:   06/23/22 90.5 kg (199 lb 9.6 oz)   05/18/22 88.5 kg (195 lb)   12/16/21 90.7 kg (200 lb)   07/22/21 86.8 kg (191 lb 6.4 oz)   05/05/21 78.9 kg (174 lb)       Objective     /82 (BP Location: Left arm)   Pulse 64   Ht 162.6 cm (64\")   Wt 90.5 kg (199 lb 9.6 oz)   BMI 34.26 kg/m²     Vitals and nursing note reviewed.   Eyes:      Pupils: Pupils are equal, round, and reactive to light.   HENT:      Head: Normocephalic.   Neck:      Vascular: No carotid bruit.   Pulmonary:      " Breath sounds: Normal breath sounds.   Cardiovascular:      Normal rate. Regular rhythm.   Pulses:     Intact distal pulses.   Edema:     Peripheral edema absent.   Abdominal:      General: Bowel sounds are normal.      Palpations: Abdomen is soft.   Musculoskeletal: Normal range of motion.      Cervical back: Normal range of motion. Skin:     General: Skin is warm.   Neurological:      Mental Status: Alert and oriented to person, place, and time.          Procedures: none today          Assessment & Plan   Diagnoses and all orders for this visit:    1. PSVT (paroxysmal supraventricular tachycardia) (HCC) (Primary)    2. History of cardiac radiofrequency ablation (RFA)    3. Palpitations    4. Hx of migraine headaches      For cardiac management of blood pressure and tachycardia continue current dose bisoprolol, fludrocortisone, and Zoloft.  Continue calcium channel blocker in form of Diltiazem on a as needed basis.  We also discussed benefit of exercise including yoga.  Due to her back she feels water aerobics may be a better option.  I provided her with statement to see if physical therapy could establish a water exercise program with her.    For management of migraines she follows with neurologist    For management of degenerative disc disease she follows with Dr. Raymundo for epidural injections.    For management of gastroparesis she is followed by gastroenterologist.  Current she is on Reglan and finds beneficial.    The report of her recent nuclear stress test done in January of this year was reviewed.  No ischemia was noted.    Advised to keep appointment with EP in December.  We will plan to see her on an annual basis but sooner should cardiac problems develop.

## 2022-06-30 DIAGNOSIS — M25.50 MULTIPLE JOINT PAIN: ICD-10-CM

## 2022-06-30 DIAGNOSIS — I49.5 TACHY-BRADY SYNDROME: ICD-10-CM

## 2022-06-30 DIAGNOSIS — R63.5 WEIGHT GAIN: ICD-10-CM

## 2022-06-30 DIAGNOSIS — R53.83 OTHER FATIGUE: ICD-10-CM

## 2022-06-30 DIAGNOSIS — I47.1 PSVT (PAROXYSMAL SUPRAVENTRICULAR TACHYCARDIA): Primary | ICD-10-CM

## 2022-06-30 DIAGNOSIS — R42 DIZZINESS: ICD-10-CM

## 2022-06-30 DIAGNOSIS — I95.1 ORTHOSTATIC HYPOTENSION: ICD-10-CM

## 2022-07-22 ENCOUNTER — TELEPHONE (OUTPATIENT)
Dept: CARDIOLOGY | Facility: CLINIC | Age: 47
End: 2022-07-22

## 2022-07-22 NOTE — TELEPHONE ENCOUNTER
Received fax from [facility] requesting her medications be filled through them. I called the patient and she does not wish to proceed with [facility].

## 2022-09-12 RX ORDER — IVABRADINE 5 MG/1
TABLET, FILM COATED ORAL
Qty: 180 TABLET | Refills: 3 | OUTPATIENT
Start: 2022-09-12

## 2022-10-06 ENCOUNTER — TELEPHONE (OUTPATIENT)
Dept: CARDIOLOGY | Facility: CLINIC | Age: 47
End: 2022-10-06

## 2022-10-12 ENCOUNTER — APPOINTMENT (OUTPATIENT)
Dept: WOMENS IMAGING | Facility: HOSPITAL | Age: 47
End: 2022-10-12

## 2022-10-12 PROCEDURE — 76642 ULTRASOUND BREAST LIMITED: CPT | Performed by: RADIOLOGY

## 2022-10-12 PROCEDURE — 77066 DX MAMMO INCL CAD BI: CPT | Performed by: RADIOLOGY

## 2022-11-03 ENCOUNTER — TELEPHONE (OUTPATIENT)
Dept: CARDIOLOGY | Facility: CLINIC | Age: 47
End: 2022-11-03

## 2022-11-03 NOTE — TELEPHONE ENCOUNTER
Caller: GARRET    Relationship: SELF     Best call back number: [phone]    What is the best time to reach you: ANYTIME    Who are you requesting to speak with (clinical staff, provider,  specific staff member): CLINICAL      What was the call regarding: PATIENT CALLED IN REGARDING SWELLING OF ANKLES. TAKING MEDICINE AS DIRECTED AND EXTRA WATER INTAKE AND LOTS OF WALKING.  HAD 2 DRINKS YESTERDAY  BUT NOTHING TODAY AND DID LIGHT YOGA THIS MORNING. [place] AND NEED TO KNOW WHAT CAN BE DONE TO HELP WITH THIS.    Do you require a callback: YES BUT IF NO ANSWER PLEASE LEAVE ADVISE ON MESSAGE

## 2022-11-03 NOTE — TELEPHONE ENCOUNTER
I spoke with patient about considering wearing compression stockings and periodically elevate legs. She is advised she may need to see a health care provider if swelling persist.

## 2022-11-10 ENCOUNTER — APPOINTMENT (OUTPATIENT)
Dept: WOMENS IMAGING | Facility: HOSPITAL | Age: 47
End: 2022-11-10

## 2022-11-10 PROCEDURE — 19083 BX BREAST 1ST LESION US IMAG: CPT | Performed by: RADIOLOGY

## 2022-11-10 PROCEDURE — 88305 TISSUE EXAM BY PATHOLOGIST: CPT

## 2022-11-10 PROCEDURE — A4648 IMPLANTABLE TISSUE MARKER: HCPCS | Performed by: RADIOLOGY

## 2022-12-05 NOTE — PROGRESS NOTES
GENERAL SURGERY BENIGN BREAST HISTORY AND PHYSICAL     SUMMARY:  Dolores Dejesus is a 47 y.o. lady with:  A new diagnosis of right breast atypical ductal hyperplasia.  -Surgical plan: Discussed recommendation for right breast wire localized excisional biopsy after MRI results. Risks (including bleeding, infection, damage to surrounding structures, need for additional surgery), benefits and alternatives were discussed with the patient who agreed and wished to proceed. Risk of pathologic upgrade was also discussed with possible need for additional treatment.     High risk screening:   -Santy Taylor lifetime breast cancer risk: 34%.This patient does be referred to medical oncology postoperatively pending final pathology for evaluation for prophylactic endocrine therapy due to high risk for breast cancer.      Referring Provider: No ref. provider found    Chief complaint: abnormal breast imaging    HPI: Ms. Dolores Dejesus is seen at the request of No ref. provider found. The patient is a 47 year old woman being seen for a new diagnosis of right breast atypical ductal hyperplasia.      This was initially detected as an imaging abnormality on routine screening. Her work-up is detailed in the breast history section below. She has had regular annual mammogram. She denies any prior history of abnormal mammograms or breast biopsies. She denies any breast lumps, pain, skin changes, or nipple discharge. She has had some right breast pain over the last few months.    She has a family history of breast cancer in her sister at age 35. She denies any family history of ovarian cancer.     TIMELINE OF WORKUP:  10/12/2022 bilateral diagnostic mammogram with ultrasound:  Finding 1: There is no radiographic abnormality in the region of the pain in the right breast at 9:00.  There are no suspicious masses, calcifications, or areas of architectural distortion.  Finding 2: There is no radiographic abnormality in the region of the pain in the left  breast at 6:00.  No solid or suspicious masses are seen.  Remainder of report pending.    11/10/2022 right breast ultrasound-guided biopsy:  The mass in the right breast at 1030 was located.  5 cores were obtained.  A mini cork tissue marker was placed.  Clip was in satisfactory position.  Pathology returned as atypical ductal hyperplasia which is concordant.    11/10/2022 pathology:  Right breast, 1030:  Atypical ductal hyperplasia    MEDICAL HISTORY:   Gynecologic History:   . P:6 AB:1  Age at first childbirth: 21  Lactation/How long: Yes  Age at menarche: 14  Age at menopause: Unsure  Total years of oral contraceptive use: 21 years previously  Total years of hormone replacement therapy: 5 years, currently still on    Past Medical History:   • GERMAN syndrome     Past Surgical History:    • Cholecystectomy   EGD and colonoscopy    Family History:    • As above    Social History:   • Denies tobacco use  • Occasional alcohol use    Allergies:   Allergies   Allergen Reactions   • Carbamates Hives   • Carisoprodol Hives       Medications:     Current Outpatient Medications:   •  bisoprolol (ZEBeta) 5 MG tablet, Take 0.5 tablets by mouth every night at bedtime., Disp: 30 tablet, Rfl: 6  •  butalbital-acetaminophen-caffeine (FIORICET, ESGIC) -40 MG per tablet, Take 1 tablet by mouth Every 4 (Four) Hours As Needed for Headache., Disp: , Rfl:   •  cyclobenzaprine (FLEXERIL) 10 MG tablet, 2 tablets daily, Disp: , Rfl:   •  diazePAM (VALIUM) 5 MG tablet, Take 5 mg by mouth Daily., Disp: , Rfl:   •  diclofenac (VOLTAREN) 50 MG EC tablet, Take 50 mg by mouth 2 (Two) Times a Day., Disp: , Rfl:   •  Diclofenac Sodium (VOLTAREN) 1 % gel gel, Apply 4 g topically to the appropriate area as directed Daily., Disp: , Rfl:   •  dilTIAZem SR (CARDIZEM SR) 60 MG 12 hr capsule, Once daily with extra tablet if needed for increased heart rate (Patient taking differently: Take 60 mg by mouth Daily As Needed. Once daily with extra  tablet if needed for increased heart rate), Disp: 180 capsule, Rfl: 3  •  Erenumab-aooe (AIMOVIG, 140 MG DOSE, SC), Inject 1 dose under the skin into the appropriate area as directed Every 30 (Thirty) Days., Disp: , Rfl:   •  estradiol (CLIMARA) 0.1 MG/24HR patch, Place 1 patch on the skin as directed by provider 2 (Two) Times a Week., Disp: , Rfl:   •  fludrocortisone 0.1 MG tablet, Take 1 tablet by mouth Every Morning., Disp: 30 tablet, Rfl: 6  •  fluticasone (FLONASE) 50 MCG/ACT nasal spray, 2 sprays into the nostril(s) as directed by provider Daily., Disp: , Rfl:   •  HYDROcodone-acetaminophen (NORCO) 5-325 MG per tablet, Take 1 tablet by mouth Every 8 (Eight) Hours As Needed., Disp: , Rfl:   •  Loratadine 10 MG capsule, Take  by mouth., Disp: , Rfl:   •  methocarbamol (ROBAXIN) 750 MG tablet, Take 750 mg by mouth Every 4 (Four) Hours., Disp: , Rfl:   •  metoclopramide (REGLAN) 10 MG tablet, Take 10 mg by mouth 2 (Two) Times a Day Before Meals., Disp: , Rfl:   •  NON FORMULARY, Epidural-Bupivicane-lidocaine injections every 3 months, Disp: , Rfl:   •  OnabotulinumtoxinA (BOTOX IJ), Inject  as directed Every 3 (Three) Months., Disp: , Rfl:   •  ondansetron (ZOFRAN) 4 MG tablet, Take 4 mg by mouth Every 8 (Eight) Hours As Needed., Disp: , Rfl:   •  promethazine (PHENERGAN) 25 MG tablet, TAKE 1 TABLET BY MOUTH EVERY 4-6 HOURS AS NEEDED FOR FORNAUSEA AND VOMITING, Disp: , Rfl:   •  sertraline (ZOLOFT) 100 MG tablet, Take 100 mg by mouth 2 (Two) Times a Day., Disp: , Rfl:   •  topiramate (TOPAMAX) 50 MG tablet, 1 1/2 tablets twice a day, Disp: , Rfl:   •  traZODone (DESYREL) 50 MG tablet, Take 50 mg by mouth Every Night., Disp: , Rfl:     Labs:    • No recent labs    ROS:   Influenza-like illness: no fever, no  cough, no  sore throat, no  body aches, no loss of sense of taste or smell, no known exposure to person with Covid-19.  Constitutional: Negative for fevers or chills  HENT: Negative for hearing loss or runny  nose  Eyes: Negative for vision changes or scleral icterus  Respiratory: Negative for cough or shortness of breath  Cardiovascular: Negative for chest pain or heart palpitations  Gastrointestinal: Negative for abdominal pain, nausea, vomiting, constipation, melena, or hematochezia  Genitourinary: Negative for hematuria or dysuria  Musculoskeletal: Negative for joint swelling or gait instability  Neurologic: Negative for tremors or seizures  Psychiatric: Negative for suicidal ideations or depression  All other systems reviewed and negative    PHYSICAL EXAM:   • ECO - Asymptomatic  • Constitutional: Well-developed well-nourished, no acute distress  • Eyes: Conjunctiva normal, sclera nonicteric  • ENMT: Hearing grossly normal, oral mucosa moist  • Neck: Supple, no palpable mass, trachea midline  • Respiratory: Clear to auscultation, normal inspiratory effort  • Cardiovascular: Regular rate, no murmur, no peripheral edema, no jugular venous distention  • Breast: symmetric  o Right: No visible abnormalities on inspection while seated, with arms raised or hands on hips. No masses, skin changes, or nipple abnormalities.  o Left: No visible abnormalities on inspection while seated, with arms raised or hands on hips. No masses, skin changes, or nipple abnormalities.  o Biopsy site appreciated in right breast, otherwise no skin changes.   o No clinical chest wall involvement.  • Gastrointestinal: Soft, nontender  • Lymphatics (palpable nodes): No cervical, supraclavicular or axillary lymphadenopathy  • Skin:  Warm, dry, no rash on visualized skin surfaces  • Musculoskeletal: Symmetric strength, normal gait  • Psychiatric: Alert and oriented ×3, normal affect     THERESA HORTA M.D.  General and Endoscopic Surgery  [facility]    [address], [address]  [place], [place], [address]  P: [phone]  F: [phone]

## 2022-12-06 ENCOUNTER — OFFICE VISIT (OUTPATIENT)
Dept: SURGERY | Facility: CLINIC | Age: 47
End: 2022-12-06

## 2022-12-06 VITALS — HEIGHT: 64 IN | WEIGHT: 181 LBS | BODY MASS INDEX: 30.9 KG/M2

## 2022-12-06 DIAGNOSIS — N60.99 ATYPICAL DUCTAL HYPERPLASIA OF BREAST: Primary | ICD-10-CM

## 2022-12-06 PROCEDURE — 99203 OFFICE O/P NEW LOW 30 MIN: CPT | Performed by: STUDENT IN AN ORGANIZED HEALTH CARE EDUCATION/TRAINING PROGRAM

## 2022-12-06 RX ORDER — TIOTROPIUM BROMIDE INHALATION SPRAY 1.56 UG/1
2 SPRAY, METERED RESPIRATORY (INHALATION) DAILY
COMMUNITY
Start: 2022-11-01

## 2022-12-06 RX ORDER — ESTRADIOL 0.1 MG/D
FILM, EXTENDED RELEASE TRANSDERMAL
COMMUNITY
Start: 2022-10-14 | End: 2023-02-02

## 2022-12-06 RX ORDER — OMEPRAZOLE 40 MG/1
40 CAPSULE, DELAYED RELEASE ORAL DAILY
COMMUNITY
Start: 2022-09-23

## 2022-12-27 ENCOUNTER — HOSPITAL ENCOUNTER (OUTPATIENT)
Dept: MRI IMAGING | Facility: HOSPITAL | Age: 47
Discharge: HOME | End: 2022-12-27
Admitting: STUDENT IN AN ORGANIZED HEALTH CARE EDUCATION/TRAINING PROGRAM

## 2022-12-27 DIAGNOSIS — N60.99 ATYPICAL DUCTAL HYPERPLASIA OF BREAST: ICD-10-CM

## 2022-12-27 PROCEDURE — 0 GADOBENATE DIMEGLUMINE 529 MG/ML SOLUTION: Performed by: STUDENT IN AN ORGANIZED HEALTH CARE EDUCATION/TRAINING PROGRAM

## 2022-12-27 PROCEDURE — 77049 MRI BREAST C-+ W/CAD BI: CPT

## 2022-12-27 PROCEDURE — A9577 INJ MULTIHANCE: HCPCS | Performed by: STUDENT IN AN ORGANIZED HEALTH CARE EDUCATION/TRAINING PROGRAM

## 2022-12-27 RX ADMIN — GADOBENATE DIMEGLUMINE 15 ML: 529 INJECTION, SOLUTION INTRAVENOUS at 13:28

## 2023-01-05 ENCOUNTER — TELEPHONE (OUTPATIENT)
Dept: SURGERY | Facility: CLINIC | Age: 48
End: 2023-01-05
Payer: OTHER GOVERNMENT

## 2023-01-05 NOTE — TELEPHONE ENCOUNTER
Patient called requesting MRI results. She reviewed results on [facility] and is wondering next step.

## 2023-01-06 ENCOUNTER — TELEPHONE (OUTPATIENT)
Dept: SURGERY | Facility: CLINIC | Age: 48
End: 2023-01-06
Payer: OTHER GOVERNMENT

## 2023-01-06 ENCOUNTER — PREP FOR SURGERY (OUTPATIENT)
Dept: OTHER | Facility: HOSPITAL | Age: 48
End: 2023-01-06
Payer: OTHER GOVERNMENT

## 2023-01-06 DIAGNOSIS — N60.99 ATYPICAL DUCTAL HYPERPLASIA OF BREAST: Primary | ICD-10-CM

## 2023-01-06 RX ORDER — DIAZEPAM 5 MG/1
5 TABLET ORAL ONCE
Status: CANCELLED | OUTPATIENT
Start: 2023-02-09 | End: 2023-01-06

## 2023-01-06 RX ORDER — CEFAZOLIN SODIUM 2 G/100ML
2 INJECTION, SOLUTION INTRAVENOUS ONCE
Status: CANCELLED | OUTPATIENT
Start: 2023-02-09

## 2023-01-06 NOTE — TELEPHONE ENCOUNTER
Called regarding breast MRI. No new findings. Recommend proceeding with right breast wire localized excisional biopsy for atypical ductal hyperplasia (right breast, 10:30, minicork).     Dasia Marroquin MD

## 2023-01-09 ENCOUNTER — PREP FOR SURGERY (OUTPATIENT)
Dept: OTHER | Facility: HOSPITAL | Age: 48
End: 2023-01-09
Payer: OTHER GOVERNMENT

## 2023-01-09 DIAGNOSIS — N60.99 ATYPICAL DUCTAL HYPERPLASIA OF BREAST: Primary | ICD-10-CM

## 2023-02-02 ENCOUNTER — PRE-ADMISSION TESTING (OUTPATIENT)
Dept: PREADMISSION TESTING | Facility: HOSPITAL | Age: 48
End: 2023-02-02
Payer: OTHER GOVERNMENT

## 2023-02-02 VITALS
BODY MASS INDEX: 31.51 KG/M2 | HEIGHT: 64 IN | SYSTOLIC BLOOD PRESSURE: 116 MMHG | WEIGHT: 184.6 LBS | RESPIRATION RATE: 16 BRPM | TEMPERATURE: 98.7 F | DIASTOLIC BLOOD PRESSURE: 80 MMHG | OXYGEN SATURATION: 98 % | HEART RATE: 97 BPM

## 2023-02-02 LAB
ANION GAP SERPL CALCULATED.3IONS-SCNC: 7 MMOL/L (ref 5–15)
BUN SERPL-MCNC: 11 MG/DL (ref 6–20)
BUN/CREAT SERPL: 14.7 (ref 7–25)
CALCIUM SPEC-SCNC: 8.5 MG/DL (ref 8.6–10.5)
CHLORIDE SERPL-SCNC: 111 MMOL/L (ref 98–107)
CO2 SERPL-SCNC: 23 MMOL/L (ref 22–29)
CREAT SERPL-MCNC: 0.75 MG/DL (ref 0.57–1)
DEPRECATED RDW RBC AUTO: 45.2 FL (ref 37–54)
EGFRCR SERPLBLD CKD-EPI 2021: 99 ML/MIN/1.73
ERYTHROCYTE [DISTWIDTH] IN BLOOD BY AUTOMATED COUNT: 13.1 % (ref 12.3–15.4)
GLUCOSE SERPL-MCNC: 122 MG/DL (ref 65–99)
HCT VFR BLD AUTO: 40.3 % (ref 34–46.6)
HGB BLD-MCNC: 13.4 G/DL (ref 12–15.9)
MCH RBC QN AUTO: 30.9 PG (ref 26.6–33)
MCHC RBC AUTO-ENTMCNC: 33.3 G/DL (ref 31.5–35.7)
MCV RBC AUTO: 92.9 FL (ref 79–97)
PLATELET # BLD AUTO: 272 10*3/MM3 (ref 140–450)
PMV BLD AUTO: 9.3 FL (ref 6–12)
POTASSIUM SERPL-SCNC: 4.3 MMOL/L (ref 3.5–5.2)
QT INTERVAL: 377 MS
RBC # BLD AUTO: 4.34 10*6/MM3 (ref 3.77–5.28)
SODIUM SERPL-SCNC: 141 MMOL/L (ref 136–145)
WBC NRBC COR # BLD: 4.37 10*3/MM3 (ref 3.4–10.8)

## 2023-02-02 PROCEDURE — 85027 COMPLETE CBC AUTOMATED: CPT

## 2023-02-02 PROCEDURE — 80048 BASIC METABOLIC PNL TOTAL CA: CPT

## 2023-02-02 PROCEDURE — 36415 COLL VENOUS BLD VENIPUNCTURE: CPT

## 2023-02-02 PROCEDURE — 93010 ELECTROCARDIOGRAM REPORT: CPT | Performed by: INTERNAL MEDICINE

## 2023-02-02 PROCEDURE — 93005 ELECTROCARDIOGRAM TRACING: CPT

## 2023-02-02 RX ORDER — CYANOCOBALAMIN (VITAMIN B-12) 500 MCG
500 TABLET ORAL DAILY
COMMUNITY

## 2023-02-02 RX ORDER — CHLORHEXIDINE GLUCONATE 500 MG/1
CLOTH TOPICAL
COMMUNITY
End: 2023-02-28

## 2023-02-02 NOTE — DISCHARGE INSTRUCTIONS
Take the following medications the morning of surgery:    SPIRIVA  OMEPRAZOLE  CARDIZEM    ARRIVE AT 0530.    If you are on prescription narcotic pain medication to control your pain you may also take that medication the morning of surgery.    General Instructions:  Do not eat solid food after midnight the night before surgery.  You may drink clear liquids day of surgery but must stop at least one hour before your hospital arrival time.  It is beneficial for you to have a clear drink that contains carbohydrates the day of surgery.  We suggest a 12 to 20 ounce bottle of Gatorade or Powerade for non-diabetic patients or a 12 to 20 ounce bottle of G2 or Powerade Zero for diabetic patients. (Pediatric patients, are not advised to drink a 12 to 20 ounce carbohydrate drink)    Clear liquids are liquids you can see through.  Nothing red in color.     Plain water                               Sports drinks  Sodas                                   Gelatin (Jell-O)  Fruit juices without pulp such as white grape juice and apple juice  Popsicles that contain no fruit or yogurt  Tea or coffee (no cream or milk added)  Gatorade / Powerade  G2 / Powerade Zero    Infants may have breast milk up to four hours before surgery.  Infants drinking formula may drink formula up to six hours before surgery.   Patients who avoid smoking, chewing tobacco and alcohol for 4 weeks prior to surgery have a reduced risk of post-operative complications.  Quit smoking as many days before surgery as you can.  Do not smoke, use chewing tobacco or drink alcohol the day of surgery.   If applicable bring your C-PAP/ BI-PAP machine.  Bring any papers given to you in the doctor’s office.  Wear clean comfortable clothes.  Do not wear contact lenses, false eyelashes or make-up.  Bring a case for your glasses.   Bring crutches or walker if applicable.  Remove all piercings.  Leave jewelry and any other valuables at home.  Hair extensions with metal clips must  be removed prior to surgery.  The Pre-Admission Testing nurse will instruct you to bring medications if unable to obtain an accurate list in Pre-Admission Testing.        If you were given a blood bank ID arm band remember to bring it with you the day of surgery.    Preventing a Surgical Site Infection:  For 2 to 3 days before surgery, avoid shaving with a razor because the razor can irritate skin and make it easier to develop an infection.    Any areas of open skin can increase the risk of a post-operative wound infection by allowing bacteria to enter and travel throughout the body.  Notify your surgeon if you have any skin wounds / rashes even if it is not near the expected surgical site.  The area will need assessed to determine if surgery should be delayed until it is healed.  The night prior to surgery shower using a fresh bar of anti-bacterial soap (such as Dial) and clean washcloth.  Sleep in a clean bed with clean clothing.  Do not allow pets to sleep with you.  Shower on the morning of surgery using a fresh bar of anti-bacterial soap (such as Dial) and clean washcloth.  Dry with a clean towel and dress in clean clothing.  Ask your surgeon if you will be receiving antibiotics prior to surgery.  Make sure you, your family, and all healthcare providers clean their hands with soap and water or an alcohol based hand  before caring for you or your wound.    Day of surgery:  Your arrival time is approximately two hours before your scheduled surgery time.  Upon arrival, a Pre-op nurse and Anesthesiologist will review your health history, obtain vital signs, and answer questions you may have.  The only belongings needed at this time will be a list of your home medications and if applicable your C-PAP/BI-PAP machine.  A Pre-op nurse will start an IV and you may receive medication in preparation for surgery, including something to help you relax.     Please be aware that surgery does come with discomfort.  We  want to make every effort to control your discomfort so please discuss any uncontrolled symptoms with your nurse.   Your doctor will most likely have prescribed pain medications.      If you are going home after surgery you will receive individualized written care instructions before being discharged.  A responsible adult must drive you to and from the hospital on the day of your surgery and stay with you for 24 hours.  Discharge prescriptions can be filled by the hospital pharmacy during regular pharmacy hours.  If you are having surgery late in the day/evening your prescription may be e-prescribed to your pharmacy.  Please verify your pharmacy hours or chose a 24 hour pharmacy to avoid not having access to your prescription because your pharmacy has closed for the day.    If you are staying overnight following surgery, you will be transported to your hospital room following the recovery period.  [facility] has all private rooms.    If you have any questions please call Pre-Admission Testing at [phone].  Deductibles and co-payments are collected on the day of service. Please be prepared to pay the required co-pay, deductible or deposit on the day of service as defined by your plan.    Call your surgeon immediately if you experience any of the following symptoms:  Sore Throat  Shortness of Breath or difficulty breathing  Cough  Chills  Body soreness or muscle pain  Headache  Fever  New loss of taste or smell  Do not arrive for your surgery ill.  Your procedure will need to be rescheduled to another time.  You will need to call your physician before the day of surgery to avoid any unnecessary exposure to hospital staff as well as other patients.    CHLORHEXIDINE CLOTH INSTRUCTIONS  The morning of surgery follow these instructions using the Chlorhexidine cloths you've been given.  These steps reduce bacteria on the body.  Do not use the cloths near your eyes, ears mouth, genitalia or on open  wounds.  Throw the cloths away after use but do not try to flush them down a toilet.      Open and remove one cloth at a time from the package.    Leave the cloth unfolded and begin the bathing.  Massage the skin with the cloths using gentle pressure to remove bacteria.  Do not scrub harshly.   Follow the steps below with one 2% CHG cloth per area (6 total cloths).  One cloth for neck, shoulders and chest.  One cloth for both arms, hands, fingers and underarms (do underarms last).  One cloth for the abdomen followed by groin.  One cloth for right leg and foot including between the toes.  One cloth for left leg and foot including between the toes.  The last cloth is to be used for the back of the neck, back and buttocks.    Allow the CHG to air dry 3 minutes on the skin which will give it time to work and decrease the chance of irritation.  The skin may feel sticky until it is dry.  Do not rinse with water or any other liquid or you will lose the beneficial effects of the CHG.  If mild skin irritation occurs, do rinse the skin to remove the CHG.  Report this to the nurse at time of admission.  Do not apply lotions, creams, ointments, deodorants or perfumes after using the clothes. Dress in clean clothes before coming to the hospital.

## 2023-02-08 ENCOUNTER — OFFICE VISIT (OUTPATIENT)
Dept: CARDIOLOGY | Facility: CLINIC | Age: 48
End: 2023-02-08
Payer: OTHER GOVERNMENT

## 2023-02-08 VITALS
OXYGEN SATURATION: 99 % | SYSTOLIC BLOOD PRESSURE: 110 MMHG | HEART RATE: 99 BPM | WEIGHT: 190 LBS | DIASTOLIC BLOOD PRESSURE: 64 MMHG | BODY MASS INDEX: 32.44 KG/M2 | HEIGHT: 64 IN

## 2023-02-08 DIAGNOSIS — I47.1 PSVT (PAROXYSMAL SUPRAVENTRICULAR TACHYCARDIA): ICD-10-CM

## 2023-02-08 DIAGNOSIS — G90.A POTS (POSTURAL ORTHOSTATIC TACHYCARDIA SYNDROME): Primary | ICD-10-CM

## 2023-02-08 PROCEDURE — 99214 OFFICE O/P EST MOD 30 MIN: CPT | Performed by: INTERNAL MEDICINE

## 2023-02-08 RX ORDER — DROXIDOPA 100 MG/1
100 CAPSULE ORAL 3 TIMES DAILY
Qty: 90 CAPSULE | Refills: 6 | Status: SHIPPED | OUTPATIENT
Start: 2023-02-08 | End: 2023-02-15

## 2023-02-08 NOTE — PROGRESS NOTES
Dolores Oleg  1975  [phone]    02/08/2023    [facility]ARDIOLOG[facility]     Referring Provider: No ref. provider found     Adin Lundy GIDEON Roy  [address]  [facility] [address]    Chief Complaint   Patient presents with   • PSVT (paroxysmal supraventricular tachycardia)        Problem List:      1. Syncope/presyncope/POTS:   a. Failed metoprolol and midodrine  b. Stress test 8/21/2018: Patient walked 6 minutes 46 seconds, rate 76% of target heart rate, patient had chest pain-inconclusive test  c. Cardiolite stress test 10/17/2018: Negative for ischemia  d. Echocardiogram 8/21/2018 EF 55% mild AI and mild MR RVSP 26 mmHg  e. Positive tilt table test 9/18/2018  f. MCOT 5/9/2019 sinus rhythm 51 to 147 bpm, average 82 bpm  g. Event Monitor 8/5- 8/13/2021: ST and NSR   h. Event Monitor 1/25/2022: EF 70%, no ischemia, low risk study.   i. Lexiscan 1/25/2022 LVEF 70%, no ischemia  2. SVT  a. SVT requiring Adenosine while in [place] 1/2021  b. EPS + RFA of SVT 2/25/2021   3. Dysfunctional uterine bleeding status post hysterectomy  4. Depression on Zoloft  5. Anxiety  6. Chronic headaches  7. Gastroparesis   8. Surgical history  a. Ear surgery  b. Hysterectomy  c. ACL surgery  d. Removal of ovarian cyst  e. Cholecystectomy 1/2022     Allergies  Allergies   Allergen Reactions   • Carbamates Hives   • Carisoprodol Hives       Current Medications    Current Outpatient Medications:   •  ALBUTEROL SULFATE HFA IN, Inhale Every 4 (Four) Hours As Needed., Disp: , Rfl:   •  bisoprolol (ZEBeta) 5 MG tablet, Take 0.5 tablets by mouth every night at bedtime., Disp: 30 tablet, Rfl: 6  •  butalbital-acetaminophen-caffeine (FIORICET, ESGIC) -40 MG per tablet, Take 1 tablet by mouth Every 4 (Four) Hours As Needed for Headache., Disp: , Rfl:   •  Chlorhexidine Gluconate Cloth 2 % pads, Apply  topically., Disp: , Rfl:   •  Cyanocobalamin (Vitamin B 12) 500 MCG tablet, Take 500 mcg by mouth  Daily., Disp: , Rfl:   •  cyclobenzaprine (FLEXERIL) 10 MG tablet, Take 20 mg by mouth Daily. 2 tablets daily, Disp: , Rfl:   •  diazePAM (VALIUM) 5 MG tablet, Take 5 mg by mouth 3 (Three) Times a Day., Disp: , Rfl:   •  diclofenac (VOLTAREN) 50 MG EC tablet, Take 50 mg by mouth 2 (Two) Times a Day., Disp: , Rfl:   •  Diclofenac Sodium (VOLTAREN) 1 % gel gel, Apply 4 g topically to the appropriate area as directed Daily., Disp: , Rfl:   •  dilTIAZem SR (CARDIZEM SR) 60 MG 12 hr capsule, Once daily with extra tablet if needed for increased heart rate (Patient taking differently: Take 60 mg by mouth Daily As Needed. Once daily with extra tablet if needed for increased heart rate), Disp: 180 capsule, Rfl: 3  •  Erenumab-aooe (AIMOVIG, 140 MG DOSE, SC), Inject 1 dose under the skin into the appropriate area as directed Every 30 (Thirty) Days., Disp: , Rfl:   •  fludrocortisone 0.1 MG tablet, Take 1 tablet by mouth Every Morning. (Patient taking differently: Take 0.1 mg by mouth Every Night.), Disp: 30 tablet, Rfl: 6  •  fluticasone (FLONASE) 50 MCG/ACT nasal spray, 2 sprays into the nostril(s) as directed by provider Every Night., Disp: , Rfl:   •  linaclotide (LINZESS) 290 MCG capsule capsule, Take 290 mcg by mouth Every Morning Before Breakfast., Disp: , Rfl:   •  Loratadine 10 MG capsule, Take 10 mg by mouth Daily., Disp: , Rfl:   •  Magnesium 125 MG capsule, Take  by mouth. HOLD PER MD INSTR, Disp: , Rfl:   •  methocarbamol (ROBAXIN) 750 MG tablet, Take 750 mg by mouth Every 4 (Four) Hours., Disp: , Rfl:   •  metoclopramide (REGLAN) 10 MG tablet, Take 10 mg by mouth 2 (Two) Times a Day Before Meals., Disp: , Rfl:   •  NON FORMULARY, Epidural-Bupivicane-lidocaine injections every 3 months, Disp: , Rfl:   •  omeprazole (priLOSEC) 40 MG capsule, Take 40 mg by mouth Daily., Disp: , Rfl:   •  OnabotulinumtoxinA (BOTOX IJ), Inject  as directed Every 3 (Three) Months., Disp: , Rfl:   •  ondansetron (ZOFRAN) 4 MG tablet,  "Take 4 mg by mouth Every 8 (Eight) Hours As Needed., Disp: , Rfl:   •  sertraline (ZOLOFT) 100 MG tablet, Take 100 mg by mouth 2 (Two) Times a Day., Disp: , Rfl:   •  Spiriva Respimat 1.25 MCG/ACT aerosol solution inhaler, Inhale 2 puffs Daily., Disp: , Rfl:   •  topiramate (TOPAMAX) 50 MG tablet, Take 75 mg by mouth 2 (Two) Times a Day. 1 1/2 tablets twice a day, Disp: , Rfl:   •  traZODone (DESYREL) 50 MG tablet, Take 50 mg by mouth Every Night., Disp: , Rfl:     History of Present Illness     Pt presents for follow up of SVT, POTS. She went to the ER two weeks ago due to heart rate increasing to 200bpm for about 20 minutes with associated chest pressure. Labwork that showed hypokalemia. She was given potassium and fluids and an extra dose of cardizem and heart rate normalized. Her HR has been 70-80 bpm since that time. She has similar episodes 1 time per month. She says everytime she does anything where she bends over she gets dizzy, nauseous and heart racing. She reports she has been more stressed over the last 6 months due to life events that could be triggering more episodes. Denies any TIA/CVA symptoms. BP is well controlled. She is having a lumpectomy tomorrow.     ROS:  General:  +fatigue, -weight gain or loss  Cardiovascular:  Denies CP, PND, +presyncope,+ near syncope, -edema +palpitations.  Pulmonary:  Denies AGUILA, -cough, or wheezing      Vitals:    02/08/23 1601   BP: 110/64   BP Location: Left arm   Patient Position: Sitting   Cuff Size: Adult   Pulse: 99   SpO2: 99%   Weight: 86.2 kg (190 lb)   Height: 162.6 cm (64\")     Body mass index is 32.61 kg/m².  PE:  General: NAD  Neck: no JVD, no carotid bruits, no TM  Heart RRR, NL S1, S2, , no rubs, murmurs  Lungs: CTA, no wheezes, rhonchi, or rales  Abd: soft, non-tender, NL BS  Ext: No musculoskeletal deformities, no edema, cyanosis, or clubbing  Psych: normal mood and affect    Diagnostic Data:      Procedures      1. POTS (postural orthostatic " tachycardia syndrome)    2. PSVT (paroxysmal supraventricular tachycardia) ([facility])          Plan:  1. POTS: Patient feels that her POTS medications are not working to the point that she would like.  She does not want to increase beta-blockers.  She previously failed midodrine.  It sounds to me like the majority of her problems are orthostatic in nature.  We will try Northera 100 mg 3 times daily     2.SVT:  Do not think that the episode that she had was consistent with SVT.  Most likely inappropriate sinus tachycardia.  We will get records from outside hospital     F/up in 6 months    Scribed for Bucky Dee MD by GIDEON Hall. 2/8/2023  17:04 EST     I, Bucky Dee MD, personally performed the services described in this documentation as scribed by the above named individual in my presence, and it is both accurate and complete.  2/8/2023  17:05 EST

## 2023-02-09 ENCOUNTER — ANESTHESIA (OUTPATIENT)
Dept: PERIOP | Facility: HOSPITAL | Age: 48
End: 2023-02-09
Payer: OTHER GOVERNMENT

## 2023-02-09 ENCOUNTER — HOSPITAL ENCOUNTER (OUTPATIENT)
Dept: MAMMOGRAPHY | Facility: HOSPITAL | Age: 48
Discharge: HOME | End: 2023-02-09
Payer: OTHER GOVERNMENT

## 2023-02-09 ENCOUNTER — ANESTHESIA EVENT (OUTPATIENT)
Dept: PERIOP | Facility: HOSPITAL | Age: 48
End: 2023-02-09
Payer: OTHER GOVERNMENT

## 2023-02-09 ENCOUNTER — HOSPITAL ENCOUNTER (OUTPATIENT)
Facility: HOSPITAL | Age: 48
Setting detail: HOSPITAL OUTPATIENT SURGERY
Discharge: HOME | End: 2023-02-09
Attending: STUDENT IN AN ORGANIZED HEALTH CARE EDUCATION/TRAINING PROGRAM | Admitting: STUDENT IN AN ORGANIZED HEALTH CARE EDUCATION/TRAINING PROGRAM
Payer: OTHER GOVERNMENT

## 2023-02-09 ENCOUNTER — APPOINTMENT (OUTPATIENT)
Dept: GENERAL RADIOLOGY | Facility: HOSPITAL | Age: 48
End: 2023-02-09
Payer: OTHER GOVERNMENT

## 2023-02-09 ENCOUNTER — TELEPHONE (OUTPATIENT)
Dept: CARDIOLOGY | Facility: CLINIC | Age: 48
End: 2023-02-09
Payer: OTHER GOVERNMENT

## 2023-02-09 VITALS
RESPIRATION RATE: 16 BRPM | HEART RATE: 80 BPM | DIASTOLIC BLOOD PRESSURE: 92 MMHG | OXYGEN SATURATION: 99 % | SYSTOLIC BLOOD PRESSURE: 111 MMHG | TEMPERATURE: 97.4 F

## 2023-02-09 DIAGNOSIS — N60.99 ATYPICAL DUCTAL HYPERPLASIA OF BREAST: Primary | ICD-10-CM

## 2023-02-09 DIAGNOSIS — N60.99 ATYPICAL DUCTAL HYPERPLASIA OF BREAST: ICD-10-CM

## 2023-02-09 PROCEDURE — 25010000002 FENTANYL CITRATE (PF) 100 MCG/2ML SOLUTION: Performed by: NURSE ANESTHETIST, CERTIFIED REGISTERED

## 2023-02-09 PROCEDURE — 19125 EXCISION BREAST LESION: CPT

## 2023-02-09 PROCEDURE — 88360 TUMOR IMMUNOHISTOCHEM/MANUAL: CPT | Performed by: STUDENT IN AN ORGANIZED HEALTH CARE EDUCATION/TRAINING PROGRAM

## 2023-02-09 PROCEDURE — 25010000002 PROPOFOL 10 MG/ML EMULSION: Performed by: NURSE ANESTHETIST, CERTIFIED REGISTERED

## 2023-02-09 PROCEDURE — 25010000002 DEXAMETHASONE SODIUM PHOSPHATE 20 MG/5ML SOLUTION: Performed by: NURSE ANESTHETIST, CERTIFIED REGISTERED

## 2023-02-09 PROCEDURE — 0 LIDOCAINE 1 % SOLUTION: Performed by: STUDENT IN AN ORGANIZED HEALTH CARE EDUCATION/TRAINING PROGRAM

## 2023-02-09 PROCEDURE — 88342 IMHCHEM/IMCYTCHM 1ST ANTB: CPT | Performed by: STUDENT IN AN ORGANIZED HEALTH CARE EDUCATION/TRAINING PROGRAM

## 2023-02-09 PROCEDURE — C1819 TISSUE LOCALIZATION-EXCISION: HCPCS

## 2023-02-09 PROCEDURE — 76098 X-RAY EXAM SURGICAL SPECIMEN: CPT

## 2023-02-09 PROCEDURE — 25010000002 ONDANSETRON PER 1 MG: Performed by: NURSE ANESTHETIST, CERTIFIED REGISTERED

## 2023-02-09 PROCEDURE — 88307 TISSUE EXAM BY PATHOLOGIST: CPT | Performed by: STUDENT IN AN ORGANIZED HEALTH CARE EDUCATION/TRAINING PROGRAM

## 2023-02-09 PROCEDURE — 88341 IMHCHEM/IMCYTCHM EA ADD ANTB: CPT | Performed by: STUDENT IN AN ORGANIZED HEALTH CARE EDUCATION/TRAINING PROGRAM

## 2023-02-09 PROCEDURE — 25010000002 CEFAZOLIN IN DEXTROSE 2-4 GM/100ML-% SOLUTION: Performed by: STUDENT IN AN ORGANIZED HEALTH CARE EDUCATION/TRAINING PROGRAM

## 2023-02-09 PROCEDURE — 19125 EXCISION BREAST LESION: CPT | Performed by: STUDENT IN AN ORGANIZED HEALTH CARE EDUCATION/TRAINING PROGRAM

## 2023-02-09 RX ORDER — FENTANYL CITRATE 50 UG/ML
INJECTION, SOLUTION INTRAMUSCULAR; INTRAVENOUS AS NEEDED
Status: DISCONTINUED | OUTPATIENT
Start: 2023-02-09 | End: 2023-02-09 | Stop reason: SURG

## 2023-02-09 RX ORDER — SODIUM CHLORIDE, SODIUM LACTATE, POTASSIUM CHLORIDE, CALCIUM CHLORIDE 600; 310; 30; 20 MG/100ML; MG/100ML; MG/100ML; MG/100ML
9 INJECTION, SOLUTION INTRAVENOUS CONTINUOUS
Status: DISCONTINUED | OUTPATIENT
Start: 2023-02-09 | End: 2023-02-09 | Stop reason: HOSPADM

## 2023-02-09 RX ORDER — HYDRALAZINE HYDROCHLORIDE 20 MG/ML
5 INJECTION INTRAMUSCULAR; INTRAVENOUS
Status: DISCONTINUED | OUTPATIENT
Start: 2023-02-09 | End: 2023-02-09 | Stop reason: HOSPADM

## 2023-02-09 RX ORDER — FLUMAZENIL 0.1 MG/ML
0.2 INJECTION INTRAVENOUS AS NEEDED
Status: DISCONTINUED | OUTPATIENT
Start: 2023-02-09 | End: 2023-02-09 | Stop reason: HOSPADM

## 2023-02-09 RX ORDER — SODIUM CHLORIDE 0.9 % (FLUSH) 0.9 %
3-10 SYRINGE (ML) INJECTION AS NEEDED
Status: DISCONTINUED | OUTPATIENT
Start: 2023-02-09 | End: 2023-02-09 | Stop reason: HOSPADM

## 2023-02-09 RX ORDER — OXYCODONE AND ACETAMINOPHEN 7.5; 325 MG/1; MG/1
1 TABLET ORAL EVERY 4 HOURS PRN
Status: DISCONTINUED | OUTPATIENT
Start: 2023-02-09 | End: 2023-02-09 | Stop reason: HOSPADM

## 2023-02-09 RX ORDER — DIPHENHYDRAMINE HYDROCHLORIDE 50 MG/ML
12.5 INJECTION INTRAMUSCULAR; INTRAVENOUS
Status: DISCONTINUED | OUTPATIENT
Start: 2023-02-09 | End: 2023-02-09 | Stop reason: HOSPADM

## 2023-02-09 RX ORDER — PROMETHAZINE HYDROCHLORIDE 25 MG/1
25 TABLET ORAL ONCE AS NEEDED
Status: DISCONTINUED | OUTPATIENT
Start: 2023-02-09 | End: 2023-02-09 | Stop reason: HOSPADM

## 2023-02-09 RX ORDER — PROMETHAZINE HYDROCHLORIDE 25 MG/1
25 SUPPOSITORY RECTAL ONCE AS NEEDED
Status: DISCONTINUED | OUTPATIENT
Start: 2023-02-09 | End: 2023-02-09 | Stop reason: HOSPADM

## 2023-02-09 RX ORDER — DIAZEPAM 5 MG/1
5 TABLET ORAL ONCE
Status: COMPLETED | OUTPATIENT
Start: 2023-02-09 | End: 2023-02-09

## 2023-02-09 RX ORDER — LIDOCAINE HYDROCHLORIDE 10 MG/ML
2.5 INJECTION, SOLUTION INFILTRATION; PERINEURAL ONCE
Status: COMPLETED | OUTPATIENT
Start: 2023-02-09 | End: 2023-02-09

## 2023-02-09 RX ORDER — NALOXONE HCL 0.4 MG/ML
0.2 VIAL (ML) INJECTION AS NEEDED
Status: DISCONTINUED | OUTPATIENT
Start: 2023-02-09 | End: 2023-02-09 | Stop reason: HOSPADM

## 2023-02-09 RX ORDER — FENTANYL CITRATE 50 UG/ML
50 INJECTION, SOLUTION INTRAMUSCULAR; INTRAVENOUS
Status: DISCONTINUED | OUTPATIENT
Start: 2023-02-09 | End: 2023-02-09 | Stop reason: HOSPADM

## 2023-02-09 RX ORDER — DIPHENHYDRAMINE HCL 25 MG
25 CAPSULE ORAL
Status: DISCONTINUED | OUTPATIENT
Start: 2023-02-09 | End: 2023-02-09 | Stop reason: HOSPADM

## 2023-02-09 RX ORDER — ONDANSETRON 2 MG/ML
INJECTION INTRAMUSCULAR; INTRAVENOUS AS NEEDED
Status: DISCONTINUED | OUTPATIENT
Start: 2023-02-09 | End: 2023-02-09 | Stop reason: SURG

## 2023-02-09 RX ORDER — EPHEDRINE SULFATE 50 MG/ML
5 INJECTION, SOLUTION INTRAVENOUS ONCE AS NEEDED
Status: DISCONTINUED | OUTPATIENT
Start: 2023-02-09 | End: 2023-02-09 | Stop reason: HOSPADM

## 2023-02-09 RX ORDER — LIDOCAINE HYDROCHLORIDE 10 MG/ML
0.5 INJECTION, SOLUTION EPIDURAL; INFILTRATION; INTRACAUDAL; PERINEURAL ONCE AS NEEDED
Status: DISCONTINUED | OUTPATIENT
Start: 2023-02-09 | End: 2023-02-09 | Stop reason: HOSPADM

## 2023-02-09 RX ORDER — CEFAZOLIN SODIUM 2 G/100ML
2 INJECTION, SOLUTION INTRAVENOUS ONCE
Status: COMPLETED | OUTPATIENT
Start: 2023-02-09 | End: 2023-02-09

## 2023-02-09 RX ORDER — LABETALOL HYDROCHLORIDE 5 MG/ML
5 INJECTION, SOLUTION INTRAVENOUS
Status: DISCONTINUED | OUTPATIENT
Start: 2023-02-09 | End: 2023-02-09 | Stop reason: HOSPADM

## 2023-02-09 RX ORDER — FAMOTIDINE 10 MG/ML
20 INJECTION, SOLUTION INTRAVENOUS ONCE
Status: COMPLETED | OUTPATIENT
Start: 2023-02-09 | End: 2023-02-09

## 2023-02-09 RX ORDER — SODIUM CHLORIDE, SODIUM LACTATE, POTASSIUM CHLORIDE, CALCIUM CHLORIDE 600; 310; 30; 20 MG/100ML; MG/100ML; MG/100ML; MG/100ML
INJECTION, SOLUTION INTRAVENOUS CONTINUOUS PRN
Status: DISCONTINUED | OUTPATIENT
Start: 2023-02-09 | End: 2023-02-09 | Stop reason: SURG

## 2023-02-09 RX ORDER — MIDAZOLAM HYDROCHLORIDE 1 MG/ML
1 INJECTION INTRAMUSCULAR; INTRAVENOUS
Status: DISCONTINUED | OUTPATIENT
Start: 2023-02-09 | End: 2023-02-09 | Stop reason: HOSPADM

## 2023-02-09 RX ORDER — ONDANSETRON 2 MG/ML
4 INJECTION INTRAMUSCULAR; INTRAVENOUS ONCE AS NEEDED
Status: DISCONTINUED | OUTPATIENT
Start: 2023-02-09 | End: 2023-02-09 | Stop reason: HOSPADM

## 2023-02-09 RX ORDER — TRAMADOL HYDROCHLORIDE 50 MG/1
50 TABLET ORAL EVERY 6 HOURS PRN
Qty: 5 TABLET | Refills: 0 | Status: SHIPPED | OUTPATIENT
Start: 2023-02-09 | End: 2023-02-28

## 2023-02-09 RX ORDER — PROPOFOL 10 MG/ML
VIAL (ML) INTRAVENOUS AS NEEDED
Status: DISCONTINUED | OUTPATIENT
Start: 2023-02-09 | End: 2023-02-09 | Stop reason: SURG

## 2023-02-09 RX ORDER — LIDOCAINE HYDROCHLORIDE 20 MG/ML
INJECTION, SOLUTION INFILTRATION; PERINEURAL AS NEEDED
Status: DISCONTINUED | OUTPATIENT
Start: 2023-02-09 | End: 2023-02-09 | Stop reason: SURG

## 2023-02-09 RX ORDER — DEXAMETHASONE SODIUM PHOSPHATE 4 MG/ML
INJECTION, SOLUTION INTRA-ARTICULAR; INTRALESIONAL; INTRAMUSCULAR; INTRAVENOUS; SOFT TISSUE AS NEEDED
Status: DISCONTINUED | OUTPATIENT
Start: 2023-02-09 | End: 2023-02-09 | Stop reason: SURG

## 2023-02-09 RX ORDER — HYDROMORPHONE HYDROCHLORIDE 1 MG/ML
0.5 INJECTION, SOLUTION INTRAMUSCULAR; INTRAVENOUS; SUBCUTANEOUS
Status: DISCONTINUED | OUTPATIENT
Start: 2023-02-09 | End: 2023-02-09 | Stop reason: HOSPADM

## 2023-02-09 RX ORDER — HYDROCODONE BITARTRATE AND ACETAMINOPHEN 7.5; 325 MG/1; MG/1
1 TABLET ORAL ONCE AS NEEDED
Status: DISCONTINUED | OUTPATIENT
Start: 2023-02-09 | End: 2023-02-09 | Stop reason: HOSPADM

## 2023-02-09 RX ORDER — SODIUM CHLORIDE 0.9 % (FLUSH) 0.9 %
3 SYRINGE (ML) INJECTION EVERY 12 HOURS SCHEDULED
Status: DISCONTINUED | OUTPATIENT
Start: 2023-02-09 | End: 2023-02-09 | Stop reason: HOSPADM

## 2023-02-09 RX ADMIN — FENTANYL CITRATE 50 MCG: 50 INJECTION, SOLUTION INTRAMUSCULAR; INTRAVENOUS at 09:00

## 2023-02-09 RX ADMIN — SODIUM CHLORIDE, POTASSIUM CHLORIDE, SODIUM LACTATE AND CALCIUM CHLORIDE 9 ML/HR: 600; 310; 30; 20 INJECTION, SOLUTION INTRAVENOUS at 08:27

## 2023-02-09 RX ADMIN — Medication 2.5 ML: at 08:02

## 2023-02-09 RX ADMIN — CEFAZOLIN SODIUM 2 G: 2 INJECTION, SOLUTION INTRAVENOUS at 08:39

## 2023-02-09 RX ADMIN — CEFAZOLIN SODIUM 2 G: 2 INJECTION, SOLUTION INTRAVENOUS at 08:52

## 2023-02-09 RX ADMIN — SODIUM CHLORIDE, POTASSIUM CHLORIDE, SODIUM LACTATE AND CALCIUM CHLORIDE 9 ML/HR: 600; 310; 30; 20 INJECTION, SOLUTION INTRAVENOUS at 08:39

## 2023-02-09 RX ADMIN — FENTANYL CITRATE 50 MCG: 50 INJECTION, SOLUTION INTRAMUSCULAR; INTRAVENOUS at 08:47

## 2023-02-09 RX ADMIN — LIDOCAINE HYDROCHLORIDE 100 MG: 20 INJECTION, SOLUTION INFILTRATION; PERINEURAL at 08:47

## 2023-02-09 RX ADMIN — FAMOTIDINE 20 MG: 10 INJECTION INTRAVENOUS at 08:28

## 2023-02-09 RX ADMIN — PROPOFOL 100 MG: 10 INJECTION, EMULSION INTRAVENOUS at 08:47

## 2023-02-09 RX ADMIN — DEXAMETHASONE SODIUM PHOSPHATE 8 MG: 4 INJECTION INTRA-ARTICULAR; INTRALESIONAL; INTRAMUSCULAR; INTRAVENOUS; SOFT TISSUE at 08:53

## 2023-02-09 RX ADMIN — ONDANSETRON 4 MG: 2 INJECTION INTRAMUSCULAR; INTRAVENOUS at 08:52

## 2023-02-09 RX ADMIN — DIAZEPAM 5 MG: 5 TABLET ORAL at 06:24

## 2023-02-09 RX ADMIN — SODIUM CHLORIDE, POTASSIUM CHLORIDE, SODIUM LACTATE AND CALCIUM CHLORIDE: 600; 310; 30; 20 INJECTION, SOLUTION INTRAVENOUS at 08:43

## 2023-02-09 RX ADMIN — PROPOFOL 120 MCG/KG/MIN: 10 INJECTION, EMULSION INTRAVENOUS at 08:47

## 2023-02-09 NOTE — H&P
Update: No changes on MRI. Plan for R breast wire loc ex bx of ADH today in the OR (10:30, minicork clip).    GENERAL SURGERY BENIGN BREAST HISTORY AND PHYSICAL      SUMMARY:  Dolores Dejesus is a 47 y.o. lady with:  A new diagnosis of right breast atypical ductal hyperplasia.  -Surgical plan: Discussed recommendation for right breast wire localized excisional biopsy after MRI results. Risks (including bleeding, infection, damage to surrounding structures, need for additional surgery), benefits and alternatives were discussed with the patient who agreed and wished to proceed. Risk of pathologic upgrade was also discussed with possible need for additional treatment.      High risk screening:   -Santy Taylor lifetime breast cancer risk: 34%.This patient does be referred to medical oncology postoperatively pending final pathology for evaluation for prophylactic endocrine therapy due to high risk for breast cancer.        Referring Provider: No ref. provider found     Chief complaint: abnormal breast imaging     HPI: Ms. Dolores Dejesus is seen at the request of No ref. provider found. The patient is a 47 year old woman being seen for a new diagnosis of right breast atypical ductal hyperplasia.       This was initially detected as an imaging abnormality on routine screening. Her work-up is detailed in the breast history section below. She has had regular annual mammogram. She denies any prior history of abnormal mammograms or breast biopsies. She denies any breast lumps, pain, skin changes, or nipple discharge. She has had some right breast pain over the last few months.     She has a family history of breast cancer in her sister at age 35. She denies any family history of ovarian cancer.      TIMELINE OF WORKUP:  10/12/2022 bilateral diagnostic mammogram with ultrasound:  Finding 1: There is no radiographic abnormality in the region of the pain in the right breast at 9:00.  There are no suspicious masses, calcifications, or  areas of architectural distortion.  Finding 2: There is no radiographic abnormality in the region of the pain in the left breast at 6:00.  No solid or suspicious masses are seen.  Remainder of report pending.     11/10/2022 right breast ultrasound-guided biopsy:  The mass in the right breast at 1030 was located.  5 cores were obtained.  A mini cork tissue marker was placed.  Clip was in satisfactory position.  Pathology returned as atypical ductal hyperplasia which is concordant.     11/10/2022 pathology:  Right breast, 1030:  Atypical ductal hyperplasia     MEDICAL HISTORY:   Gynecologic History:   . P:6 AB:1  Age at first childbirth: 21  Lactation/How long: Yes  Age at menarche: 14  Age at menopause: Unsure  Total years of oral contraceptive use: 21 years previously  Total years of hormone replacement therapy: 5 years, currently still on     Past Medical History:   • GERMAN syndrome      Past Surgical History:    • Cholecystectomy   EGD and colonoscopy     Family History:    • As above     Social History:   • Denies tobacco use  • Occasional alcohol use     Allergies:        Allergies   Allergen Reactions   • Carbamates Hives   • Carisoprodol Hives         Medications:      Current Outpatient Medications:   •  bisoprolol (ZEBeta) 5 MG tablet, Take 0.5 tablets by mouth every night at bedtime., Disp: 30 tablet, Rfl: 6  •  butalbital-acetaminophen-caffeine (FIORICET, ESGIC) -40 MG per tablet, Take 1 tablet by mouth Every 4 (Four) Hours As Needed for Headache., Disp: , Rfl:   •  cyclobenzaprine (FLEXERIL) 10 MG tablet, 2 tablets daily, Disp: , Rfl:   •  diazePAM (VALIUM) 5 MG tablet, Take 5 mg by mouth Daily., Disp: , Rfl:   •  diclofenac (VOLTAREN) 50 MG EC tablet, Take 50 mg by mouth 2 (Two) Times a Day., Disp: , Rfl:   •  Diclofenac Sodium (VOLTAREN) 1 % gel gel, Apply 4 g topically to the appropriate area as directed Daily., Disp: , Rfl:   •  dilTIAZem SR (CARDIZEM SR) 60 MG 12 hr capsule, Once daily with  extra tablet if needed for increased heart rate (Patient taking differently: Take 60 mg by mouth Daily As Needed. Once daily with extra tablet if needed for increased heart rate), Disp: 180 capsule, Rfl: 3  •  Erenumab-aooe (AIMOVIG, 140 MG DOSE, SC), Inject 1 dose under the skin into the appropriate area as directed Every 30 (Thirty) Days., Disp: , Rfl:   •  estradiol (CLIMARA) 0.1 MG/24HR patch, Place 1 patch on the skin as directed by provider 2 (Two) Times a Week., Disp: , Rfl:   •  fludrocortisone 0.1 MG tablet, Take 1 tablet by mouth Every Morning., Disp: 30 tablet, Rfl: 6  •  fluticasone (FLONASE) 50 MCG/ACT nasal spray, 2 sprays into the nostril(s) as directed by provider Daily., Disp: , Rfl:   •  HYDROcodone-acetaminophen (NORCO) 5-325 MG per tablet, Take 1 tablet by mouth Every 8 (Eight) Hours As Needed., Disp: , Rfl:   •  Loratadine 10 MG capsule, Take  by mouth., Disp: , Rfl:   •  methocarbamol (ROBAXIN) 750 MG tablet, Take 750 mg by mouth Every 4 (Four) Hours., Disp: , Rfl:   •  metoclopramide (REGLAN) 10 MG tablet, Take 10 mg by mouth 2 (Two) Times a Day Before Meals., Disp: , Rfl:   •  NON FORMULARY, Epidural-Bupivicane-lidocaine injections every 3 months, Disp: , Rfl:   •  OnabotulinumtoxinA (BOTOX IJ), Inject  as directed Every 3 (Three) Months., Disp: , Rfl:   •  ondansetron (ZOFRAN) 4 MG tablet, Take 4 mg by mouth Every 8 (Eight) Hours As Needed., Disp: , Rfl:   •  promethazine (PHENERGAN) 25 MG tablet, TAKE 1 TABLET BY MOUTH EVERY 4-6 HOURS AS NEEDED FOR FORNAUSEA AND VOMITING, Disp: , Rfl:   •  sertraline (ZOLOFT) 100 MG tablet, Take 100 mg by mouth 2 (Two) Times a Day., Disp: , Rfl:   •  topiramate (TOPAMAX) 50 MG tablet, 1 1/2 tablets twice a day, Disp: , Rfl:   •  traZODone (DESYREL) 50 MG tablet, Take 50 mg by mouth Every Night., Disp: , Rfl:      Labs:    • No recent labs     ROS:   Influenza-like illness: no fever, no  cough, no  sore throat, no  body aches, no loss of sense of taste or  smell, no known exposure to person with Covid-19.  Constitutional: Negative for fevers or chills  HENT: Negative for hearing loss or runny nose  Eyes: Negative for vision changes or scleral icterus  Respiratory: Negative for cough or shortness of breath  Cardiovascular: Negative for chest pain or heart palpitations  Gastrointestinal: Negative for abdominal pain, nausea, vomiting, constipation, melena, or hematochezia  Genitourinary: Negative for hematuria or dysuria  Musculoskeletal: Negative for joint swelling or gait instability  Neurologic: Negative for tremors or seizures  Psychiatric: Negative for suicidal ideations or depression  All other systems reviewed and negative     PHYSICAL EXAM:   • ECO - Asymptomatic  • Constitutional: Well-developed well-nourished, no acute distress  • Eyes: Conjunctiva normal, sclera nonicteric  • ENMT: Hearing grossly normal, oral mucosa moist  • Neck: Supple, no palpable mass, trachea midline  • Respiratory: Clear to auscultation, normal inspiratory effort  • Cardiovascular: Regular rate, no murmur, no peripheral edema, no jugular venous distention  • Breast: symmetric  ? Right: No visible abnormalities on inspection while seated, with arms raised or hands on hips. No masses, skin changes, or nipple abnormalities.  ? Left: No visible abnormalities on inspection while seated, with arms raised or hands on hips. No masses, skin changes, or nipple abnormalities.  ? Biopsy site appreciated in right breast, otherwise no skin changes.   ? No clinical chest wall involvement.  • Gastrointestinal: Soft, nontender  • Lymphatics (palpable nodes): No cervical, supraclavicular or axillary lymphadenopathy  • Skin:  Warm, dry, no rash on visualized skin surfaces  • Musculoskeletal: Symmetric strength, normal gait  • Psychiatric: Alert and oriented ×3, normal affect      THERESA HORTA M.D.  General and Endoscopic Surgery  [facility]     [address], [address]  [place], [place], [address]  P: [phone]  F: [phone]

## 2023-02-09 NOTE — TELEPHONE ENCOUNTER
Patient's insurance will not cover Droxidopa. They are wanting Fludrocortisone, pyrogestamine or atomoxitine.

## 2023-02-09 NOTE — OP NOTE
OPERATIVE REPORT     DATE:  2/9/2023     SURGEON: Dasia Marroquin MD      ASSISTANT:  Heather Sumner PA-C, who was present for necessary suctioning, retracting, suturing throughout the procedure      OPERATION PERFORMED:  Right breast wire localized excisional biopsy     PREOPERATIVE DIAGNOSIS:  Atypical ductal hyperplasia of the right breast      POSTOPERATIVE DIAGNOSIS: Same     ANESTHESIA:  MAC     SPECIMEN:  Right breast wire localized excisional biopsy (short stitch superior, long lateral, double anterior)     DRAINS: None     BLOOD LOSS: Minimal     INDICATION FOR OPERATION: Mrs. Dejesus is a 47 y.o. lady who was recently diagnosed with right breast atypical ductal hyperplasia.  Right breast wire localized Excisional biopsy was recommended. All risks (including bleeding, infection, damage to surrounding structures, need for further surgery, pathologic upgrade), benefits and alternatives were explained to the patient who agreed and wished to proceed. Informed consent was signed.      OPERATIVE COURSE: The patient was taken to the operating room, transferred onto the operating room table, and underwent anesthesia without incident. The patient was prepped and draped in sterile fashion. A time out was performed and preoperative antibiotics were given.  Half percent Marcaine with epinephrine was injected into the skin and subcutaneous tissues.  A curvilinear incision was made along the nipple areolar border from the 8 to 11 o'clock position.  Bovie electrocautery was used to create a flap along the length of the wire 1 cm in thickness.    The tissue was transected circumferentially around the tip of the wire. Lastly the wire was brought through the incision with 2 hemostats.   The tissue was amputated at its base.  It was marked as listed above.  Specimen radiograph confirmed clip, wire, and abnormal breast tissue.  It was sent for fresh permanent specimen.  The area was irrigated and appeared hemostatic.  There  were no signs of bleeding.  The rest of the local anesthetic was administered.  It was closed with interrupted 3-0 Vicryl sutures and a running 4-0 Monocryl suture.  Skin glue was placed over the incision.  The patient tolerated the procedure well. All needle and lap counts were correct at the end of the case. The patient was then awoken from anesthesia and taken to recovery for further monitoring.         Dasia Marroquin MD   General and Endoscopic Surgery  [facility]     [address] [place] [address], [address]  [place], [place], [address]  P: [phone]  F: [phone]

## 2023-02-09 NOTE — ANESTHESIA PREPROCEDURE EVALUATION
Anesthesia Evaluation     Patient summary reviewed and Nursing notes reviewed   history of anesthetic complications: PONV  NPO Solid Status: > 8 hours  NPO Liquid Status: > 2 hours           Airway   Mallampati: II  TM distance: >3 FB  Neck ROM: full  No difficulty expected  Dental      Pulmonary    (+) asthma,  Cardiovascular     ECG reviewed    (+) hypertension, dysrhythmias (pSVT, tachy-semaj syndrome, POTS disease),       Neuro/Psych  (+) headaches, dizziness/light headedness, syncope (POTS), psychiatric history,    GI/Hepatic/Renal/Endo    (+) obesity,  GERD (on reglan),  renal disease CRI,     Musculoskeletal     Abdominal    Substance History   (+) alcohol use (2/week),      OB/GYN          Other   arthritis,                      Anesthesia Plan    ASA 2     MAC     (I have reviewed the patient's history and chart with the patient, including all pertinent laboratory results and imaging. I have explained the risks of anesthesia including but not limited to dental damage, corneal abrasion, nerve injury, MI, stroke, aspiration, and death. Patient has agreed to proceed.     /66 (BP Location: Left arm, Patient Position: Lying)   Pulse 88   Temp 36.4 °C (97.6 °F) (Oral)   Resp 16   SpO2 98%   )  intravenous induction     Anesthetic plan, risks, benefits, and alternatives have been provided, discussed and informed consent has been obtained with: patient.        CODE STATUS:

## 2023-02-09 NOTE — ANESTHESIA POSTPROCEDURE EVALUATION
Patient: Dolores Dejesus    Procedure Summary     Date: 02/09/23 Room / Location:  [facility] OR [facility] /  [facility]    Anesthesia Start: 0843 Anesthesia Stop: 0938    Procedure: Right breast wire localized excisional biopsy (Right: Breast) Diagnosis:       Atypical ductal hyperplasia of breast      (Atypical ductal hyperplasia of breast [N60.99])    Surgeons: Dasia Marroquin MD Provider: Vanessa Zuleta MD    Anesthesia Type: MAC ASA Status: 2          Anesthesia Type: MAC    Vitals  Vitals Value Taken Time   /57 02/09/23 0946   Temp 36.3 °C (97.4 °F) 02/09/23 0936   Pulse 82 02/09/23 0946   Resp 16 02/09/23 0946   SpO2 100 % 02/09/23 0946           Post Anesthesia Care and Evaluation    Patient location during evaluation: bedside  Pain management: adequate    Airway patency: patent  Anesthetic complications: No anesthetic complications    Cardiovascular status: acceptable  Respiratory status: acceptable  Hydration status: acceptable

## 2023-02-10 ENCOUNTER — TELEPHONE (OUTPATIENT)
Dept: SURGERY | Facility: CLINIC | Age: 48
End: 2023-02-10
Payer: OTHER GOVERNMENT

## 2023-02-13 LAB
LAB AP CASE REPORT: NORMAL
LAB AP CLINICAL INFORMATION: NORMAL
LAB AP DIAGNOSIS COMMENT: NORMAL
LAB AP SPECIAL STAINS: NORMAL
LAB AP SYNOPTIC CHECKLIST: NORMAL
PATH REPORT.FINAL DX SPEC: NORMAL
PATH REPORT.GROSS SPEC: NORMAL

## 2023-02-15 ENCOUNTER — TELEPHONE (OUTPATIENT)
Dept: SURGERY | Facility: CLINIC | Age: 48
End: 2023-02-15
Payer: OTHER GOVERNMENT

## 2023-02-15 DIAGNOSIS — C50.911 MALIGNANT NEOPLASM OF RIGHT BREAST IN FEMALE, ESTROGEN RECEPTOR POSITIVE, UNSPECIFIED SITE OF BREAST: Primary | ICD-10-CM

## 2023-02-15 DIAGNOSIS — Z17.0 MALIGNANT NEOPLASM OF RIGHT BREAST IN FEMALE, ESTROGEN RECEPTOR POSITIVE, UNSPECIFIED SITE OF BREAST: Primary | ICD-10-CM

## 2023-02-15 NOTE — TELEPHONE ENCOUNTER
Called regarding right breast excisional biopsy results.    Preop dx: ADH  Postop dx: DCIS (25mm), grade I, ER/ID+, Ki 67 10%  Margins: positive anterior and inferior, close superior    Final Diagnosis   1. Right Breast, Oriented Needle Localization Lumpectomy (11 grams): LOW GRADE DUCTAL CARCINOMA IN SITU (DCIS).  A. Tumor site: 10:30 o'clock.               B. Extent of DCIS: DCIS is multifocal, contiguously spanning approximately 25 mm from medial to lateral.               C. Architecture patterns: Solid and micropapillary.               D. Nuclear grade: Low.               E. Margins: DCIS focally extends to the anterior (3.0 mm focus) and inferior (0.4 mm) margins, is present 1.5 mm       from the superior margin (2.0 mm focus), 2.3 mm from the posterior margin and 5 mm from medial and lateral       margins of excision.  F. No lymph nodes submitted.  G. Hormone receptor status: ER moderately positive, ID strongly positive, Ki67 ~10% (see biomarker template).   H. Pathologic stage: pTis, NX.         [id number]    We discussed that her pathology upstaged to DCIS.  We discussed the nature of this in the treatment options.  She has several family members who are just diagnosed with breast cancer very young ages.  She would like to proceed with bilateral skin sparing mastectomies with right sentinel lymph node biopsy.  She would like to see a plastic surgeon for reconstruction.  Discussed we will likely plan for surgery in 6 to 8 weeks to allow for some healing from her recent excisional biopsy    Dasia Marroquin MD

## 2023-02-16 RX ORDER — DROXIDOPA 100 MG/1
100 CAPSULE ORAL 3 TIMES DAILY
Qty: 90 CAPSULE | Refills: 6 | Status: SHIPPED | OUTPATIENT
Start: 2023-02-16 | End: 2023-03-22

## 2023-02-17 ENCOUNTER — TELEPHONE (OUTPATIENT)
Dept: CARDIOLOGY | Facility: CLINIC | Age: 48
End: 2023-02-17
Payer: OTHER GOVERNMENT

## 2023-02-17 NOTE — TELEPHONE ENCOUNTER
I submitted a PA for droxidopa 100 mg capsules through [employer] Med[employer].      Dolores Dejesus (Marcelo: [id number])    [employer] is reviewing your PA request and will respond within 24 hours for Medicaid or up to 72 hours for non-Medicaid plans, based on the required timeframe determined by state or federal regulations. To check for an update later, open this request from your dashboard.

## 2023-02-20 ENCOUNTER — PATIENT OUTREACH (OUTPATIENT)
Dept: OTHER | Facility: HOSPITAL | Age: 48
End: 2023-02-20
Payer: OTHER GOVERNMENT

## 2023-02-20 NOTE — PROGRESS NOTES
Referral received from Dr. Marroquin's office. I called MsYasmany Oleg and introduced myself and navigational services. She stated Dr. Marroquin called her with the pathology results from her excisional  biopsy and discussed she has DCIS. She will meet with Dr. Marroquin again on Feb 28th but is leaning toward a double mastectomy with reconstruction.     She stated she has a wonderful support system with family. She also stated she would call her insurance company and ask for assistance with understanding what is allowable with cancer care and what she needs to do specifically. Agreed that was a great plan. She also stated they live outside of [place] but will have her daughters help drive her to appointments as needed.      She stated 3 out of 4 of her sisters have also has cancer and one passed away last Sept. She is seeing a psychiatrist regularly and will continue to follow with them. We discussed we have support options if the need arises. She was thankful for the information.      Will plan to meet with her on Feb 28th to go over any new needs. She verbalized appreciation for navigational services and she has my contact information and will call with any questions that arise.

## 2023-02-21 NOTE — TELEPHONE ENCOUNTER
Droxidopa has been denied, I called patient to update her on this and to see if she had appealed this, no answer. I left a voicemail for a return call.

## 2023-02-23 DIAGNOSIS — R00.0 TACHYCARDIA: ICD-10-CM

## 2023-02-23 DIAGNOSIS — R42 DIZZINESS: ICD-10-CM

## 2023-02-23 DIAGNOSIS — I49.5 TACHY-BRADY SYNDROME: ICD-10-CM

## 2023-02-23 DIAGNOSIS — R00.2 PALPITATIONS: ICD-10-CM

## 2023-02-23 RX ORDER — DILTIAZEM HYDROCHLORIDE 60 MG/1
60 CAPSULE, EXTENDED RELEASE ORAL DAILY PRN
Qty: 90 CAPSULE | Refills: 1 | Status: SHIPPED | OUTPATIENT
Start: 2023-02-23 | End: 2023-03-10 | Stop reason: SDUPTHER

## 2023-02-28 ENCOUNTER — OFFICE VISIT (OUTPATIENT)
Dept: SURGERY | Facility: CLINIC | Age: 48
End: 2023-02-28
Payer: OTHER GOVERNMENT

## 2023-02-28 ENCOUNTER — PREP FOR SURGERY (OUTPATIENT)
Dept: OTHER | Facility: HOSPITAL | Age: 48
End: 2023-02-28
Payer: OTHER GOVERNMENT

## 2023-02-28 ENCOUNTER — PATIENT OUTREACH (OUTPATIENT)
Dept: OTHER | Facility: HOSPITAL | Age: 48
End: 2023-02-28
Payer: OTHER GOVERNMENT

## 2023-02-28 VITALS — WEIGHT: 190 LBS | BODY MASS INDEX: 32.44 KG/M2 | HEIGHT: 64 IN

## 2023-02-28 DIAGNOSIS — D05.11 DUCTAL CARCINOMA IN SITU (DCIS) OF RIGHT BREAST: Primary | ICD-10-CM

## 2023-02-28 DIAGNOSIS — D05.11 DUCTAL CARCINOMA IN SITU OF RIGHT BREAST: Primary | ICD-10-CM

## 2023-02-28 PROCEDURE — 99024 POSTOP FOLLOW-UP VISIT: CPT | Performed by: STUDENT IN AN ORGANIZED HEALTH CARE EDUCATION/TRAINING PROGRAM

## 2023-02-28 RX ORDER — LIDOCAINE AND PRILOCAINE 25; 25 MG/G; MG/G
1 CREAM TOPICAL ONCE
Status: CANCELLED | OUTPATIENT
Start: 2023-03-29 | End: 2023-02-28

## 2023-02-28 RX ORDER — CEFAZOLIN SODIUM 2 G/100ML
2 INJECTION, SOLUTION INTRAVENOUS ONCE
Status: CANCELLED | OUTPATIENT
Start: 2023-03-29 | End: 2023-02-28

## 2023-02-28 RX ORDER — DIAZEPAM 5 MG/1
10 TABLET ORAL ONCE
Status: CANCELLED | OUTPATIENT
Start: 2023-03-29 | End: 2023-02-28

## 2023-02-28 NOTE — PROGRESS NOTES
Met  Ms. Dejesus and her spouse during her surgery consult. I introduced myself and navigational services. She has a good understanding of her pathology and treatment options presented to her by Dr. Marroquin and was able to verbalize teach back. After the consult she is leaning toward having a double mastectomy with reconstruction. She is comfortable with this plan and has no questions or concerns following the consult.      She stated she has good support from her  and family, but is struggling emotionally with her diagnosis, upcoming surgery, and grief surrounding recent family members death. She is not sleeping well, stated she feels numb and has no matias currently. Her distress score was 7 and her PHq9 was around 6. She stated she is on Valium, Trazadone, and Zoloft that her psychiatrist in [facility] prescribes. She stated the medications are not helping at this time and she is looking for different supportive care help. She also had a sister pass away from breast cancer in Sept and she is mourning that loss as well. Referral made to supportive oncology social work team for psychosocial support.      She stated she will talk with her insurance company about financial naviagtion options and let me know if any needs arise. Otherwise, she has no other source needs or ongoing concerns at this time.      We discussed integrative therapies and other services [facility]. She received a navigation folder with the following information:     [facility] Support [facility], Cancer and Restorative Exercise (CARE), [employer] Exercise program, Guide for the Newly Diagnosed, [employer], [facility], Massage Therapy, Reiki Therapy, [employer] [place], Cancer Nutrition, [facility] Cancer Support [facility] and Survivorship Clinic.     She verbalized appreciation for navigational services and she has my contact information and will call with any questions that arise.

## 2023-03-03 ENCOUNTER — TELEPHONE (OUTPATIENT)
Dept: SURGERY | Facility: CLINIC | Age: 48
End: 2023-03-03
Payer: OTHER GOVERNMENT

## 2023-03-03 ENCOUNTER — TELEPHONE (OUTPATIENT)
Dept: OTHER | Facility: HOSPITAL | Age: 48
End: 2023-03-03
Payer: OTHER GOVERNMENT

## 2023-03-03 DIAGNOSIS — D05.11 DUCTAL CARCINOMA IN SITU OF RIGHT BREAST: Primary | ICD-10-CM

## 2023-03-03 NOTE — TELEPHONE ENCOUNTER
Called patient to advised that the tube sent to [facility] for genetic testing was  and that they cannot use the sample. Advised that she can come back in for another blood draw or we can have a saliva kit sent to her house. Pt states she will be in the area on Monday 3/6 for an appointment with plastic surgery. Advised that she can come in either before or after her appointment with plastics. She is not sure what time her appointment on Monday. When she figures out what time her appointment is she will call back to schedule MA visit for another blood draw.

## 2023-03-03 NOTE — TELEPHONE ENCOUNTER
..  ONCOLOGY SOCIAL WORKER PSYCHOSOCIAL ASSESSMENT    Date:  3/3/2023      Reason for Visit:   Psychosocial needs    Distress Screening Complete:  YES   DISTRESS SCORE:  7       I.    PHYSICAL:     Diagnosis:   Patient Active Problem List   Diagnosis   • Near syncope   • Dizziness   • Migraine aura without headache   • Tachycardia   • Hx of migraine headaches   • POTS (postural orthostatic tachycardia syndrome)   • Palpitations   • Metabolic syndrome   • PSVT (paroxysmal supraventricular tachycardia) (HCC)   • History of cardiac radiofrequency ablation (RFA)   • Atypical ductal hyperplasia of breast   • Ductal carcinoma in situ (DCIS) of right breast      Date of Diagnosis:  2/15/23   Recurrent of same type:  NO  Recurrence with New Primary:  NO   Date of Previous Diagnosis: N/A   Treatment:  Uncertain    Other:  N/A     II.   FINANCIAL:  Employment Status:  Unemployed  VA Benefits: NO  Source of Income:  Spouse  Other:    Transportation Issues:  NO   Means of Transportation:  Self/ spouse or son  Prescription Drug Coverage: YES  Medications Unable to Afford: N/A  Pharmacy Name/Location:    [facility]  for [place], [place] - [address] [place] [address] - [phone] PH - [phone] FX  [address] [facility] [address]  Phone: [phone] Fax: [phone]    [id number] - [place], [place] - [address] AT [facility] [address] [facility] - [phone]  - [phone] FX  [address] [place] [address]  Phone: [phone] Fax: [phone]    [facility] - [place], [place] - [address] [place] [address] - [phone]  - [phone] FX  [address] [facility] [address]  Phone: [phone] Fax: [phone]         III.  SOCIAL:    Marital Status:    Significant Other:  NO  Children under 18: YES- 2 ages 10 and 11; 2 adult children ages 22 and 25 and a step son age 25  Do the children know about the cancer diagnosis: YES  Does  the patient have:  None  Home Situation:  Lives at home with spouse and 2 young children.    Patient's Support System:  Family    ADLs - Functioning Level at Home:  Independent  Able to (on own):  Walk, Bathe, Dress and Cook  Current DME: None  Current Home Health: N/A  Dialysis:  NO  Any Drug Use History:  NO  Any cultural/ethical background issues:  NO      IV.    MENTAL:    Emotional Status:  Angry, Anxious, Depressed and Stressed  ANXIETY SCORE (FRANCESCO-7):  N/A    Mental Status:  Alert and Oriented  Any current psychiatric illness:  YES  History of psychiatric illness: YES  Family history of psychiatric illness:  YES  Current Psychiatrist:  Gilda Velazquez  Current Therapist:  Unsure of name  Taking any psychiatric medications:  YES  Suicidal Ideation:  NO   Homicidal Ideation:  NO  Coping Skills / Strengths:  Family support and love for family  DEPRESSION SCALE SCORE (PHQ-9): 6      V.    SPIRITUAL:    Alevism Affiliation:  Uatsdin  Attend Services Regularly:  NO  Any spiritual support:  NO      VI.   OTHER:    Education Level:  Some College  Legal Issues:  NO      VII.  GOALS / NEEDS:    Goals:  Processing grief, self-forgiveness, medication management  Needs:   and Advanced Care Planning   Referrals Made:   and Advanced Care Planning       VIII.    NOTES:    Spoke to the patient via telephone [phone], explained role of OSW and provided psychosocial and therapeutic support.  The patient resides at home with her spouse and 2 younger children ages 10 and 11.  She has a 25 year old daughter named Acacia who lives in [place] and attends [employer].  The patient will sometimes stay with her if she has an appointment in [place] and drives herself.  The patient has a 22 year old son named Dane who lives in [place] and attends [employer]. The patient also has a 25 year old step-son with whom they are estranged.    The patient states that she has been  to her spouse for 14 years,  he is a  and she reports amazing support from her spouse and children.  The patient reports that her mother is aware of aware of her diagnosis but she has not informed her father yet as the deaths that they have had in their family have all been on his side of the family.  The patient reports have 4 siblings:  Gilda who is , Sukhi who lives on the streets, Taya and Lisa.  The patient expressed being upset due to not being able to have communication with the siblings whom she is closest to due to Gilda being  and Sukhi being on the street.      The patient discussed that her 22 year old son who was diagnosed with Kawasaki disease at a young age has been upset with her for  his father whom she was  to for 10 years.  She states that he is also upset with her for having him take a new drug when he was younger for his disease which gave him his first heart attack. The patient states that she has been mad at God for allowing her son to have that disease and for giving her cancer.  She states that she would be agreeable to speaking to  to process her feelings about God as she has not been connected with a  in her town. (She reports being affiliated to the [employer]).  Referral was made to Chaplain Sierra to reach out to the patient.  The patient also stated that she would be agreeable to discussing Advanced Care Planning and referral was made to Tracee however Chaplain Sierra may also discuss with the patient when he speaks to her.  Discussed with the patient the need for her to learn self-forgiveness.      The patient states that she is not currently employed, filed for disability but was denied and has an appointment with an  to assist her with the process.  The patient has concerns about her medications as she is currently on Valium, Trazadone and Zoloft.  She states that combination of medications is not successful to assist her in sleeping.   She states that her psychiatrist is Gilda Velazquez who changed the patient’s medication regime from Ambien and Xanax.  The patient states that she has trouble falling asleep and her brain does not shut down.  The patient was informed to continue to discuss those concerns with her psychiatrist.     The patient will be having a bilateral mastectomy with reconstruction and struggling with losing that part of her femininity. Discussed with the patient the need to her to work on the grief process regarding the loss of her breasts. OSW will follow up with the patient after her surgery on  3/27/23.        SOLO Ramirez  03/03/23  13:57 EST

## 2023-03-09 RX ORDER — FLUDROCORTISONE ACETATE 0.1 MG/1
0.2 TABLET ORAL DAILY
Qty: 60 TABLET | Refills: 6
Start: 2023-03-09

## 2023-03-10 DIAGNOSIS — I49.5 TACHY-BRADY SYNDROME: ICD-10-CM

## 2023-03-10 DIAGNOSIS — R42 DIZZINESS: ICD-10-CM

## 2023-03-10 DIAGNOSIS — R00.2 PALPITATIONS: ICD-10-CM

## 2023-03-10 DIAGNOSIS — R00.0 TACHYCARDIA: ICD-10-CM

## 2023-03-10 RX ORDER — DILTIAZEM HYDROCHLORIDE 60 MG/1
60 CAPSULE, EXTENDED RELEASE ORAL DAILY PRN
Qty: 90 CAPSULE | Refills: 3 | Status: SHIPPED | OUTPATIENT
Start: 2023-03-10

## 2023-03-14 ENCOUNTER — TELEPHONE (OUTPATIENT)
Dept: OTHER | Facility: HOSPITAL | Age: 48
End: 2023-03-14
Payer: OTHER GOVERNMENT

## 2023-03-14 NOTE — TELEPHONE ENCOUNTER
[facility] MULTIDISCIPLINARY CLINIC  SURVIVORSHIP SERVICES CARE COORDINATION NOTE  PHONE    Received message from Nidia DENNY, patient requesting additional assistance with advance care planning.  [place]5 miles away and difficult to come in for additional appointments.    Please call the patient.  She explains she recently lost her sister and her whole family was scrambling to understand and carry out her wishes as no prior conversations had happened.  Dolores feels like it is important for her to do this now and she is also encouraging her parents.    Advised I would mail the booklet conversations that matter and [facility].  Advised I would refer to ACP facilitator team to ask for phone follow-up at some point next week to answer additional questions.  Advised I would also include my contact information and the patient may call me at any point.  Briefly reintroduced concept of survivorship treatment summary visit and advised we would reach out to her after completion of treatment.    Patient encouraged to call the office at any point for additional information, resources or support.

## 2023-03-22 ENCOUNTER — PRE-ADMISSION TESTING (OUTPATIENT)
Dept: PREADMISSION TESTING | Facility: HOSPITAL | Age: 48
End: 2023-03-22
Payer: OTHER GOVERNMENT

## 2023-03-22 ENCOUNTER — CLINICAL SUPPORT (OUTPATIENT)
Dept: SURGERY | Facility: CLINIC | Age: 48
End: 2023-03-22
Payer: OTHER GOVERNMENT

## 2023-03-22 ENCOUNTER — PATIENT OUTREACH (OUTPATIENT)
Dept: OTHER | Facility: HOSPITAL | Age: 48
End: 2023-03-22
Payer: OTHER GOVERNMENT

## 2023-03-22 VITALS
WEIGHT: 187.6 LBS | DIASTOLIC BLOOD PRESSURE: 73 MMHG | OXYGEN SATURATION: 100 % | BODY MASS INDEX: 32.03 KG/M2 | HEART RATE: 83 BPM | SYSTOLIC BLOOD PRESSURE: 105 MMHG | HEIGHT: 64 IN | TEMPERATURE: 97.7 F | RESPIRATION RATE: 16 BRPM

## 2023-03-22 LAB
ANION GAP SERPL CALCULATED.3IONS-SCNC: 7.1 MMOL/L (ref 5–15)
BUN SERPL-MCNC: 14 MG/DL (ref 6–20)
BUN/CREAT SERPL: 20.3 (ref 7–25)
CALCIUM SPEC-SCNC: 9 MG/DL (ref 8.6–10.5)
CHLORIDE SERPL-SCNC: 109 MMOL/L (ref 98–107)
CO2 SERPL-SCNC: 23.9 MMOL/L (ref 22–29)
CREAT SERPL-MCNC: 0.69 MG/DL (ref 0.57–1)
DEPRECATED RDW RBC AUTO: 45.7 FL (ref 37–54)
EGFRCR SERPLBLD CKD-EPI 2021: 107.9 ML/MIN/1.73
ERYTHROCYTE [DISTWIDTH] IN BLOOD BY AUTOMATED COUNT: 12.8 % (ref 12.3–15.4)
GLUCOSE SERPL-MCNC: 91 MG/DL (ref 65–99)
HCT VFR BLD AUTO: 41 % (ref 34–46.6)
HGB BLD-MCNC: 13.6 G/DL (ref 12–15.9)
MCH RBC QN AUTO: 31.7 PG (ref 26.6–33)
MCHC RBC AUTO-ENTMCNC: 33.2 G/DL (ref 31.5–35.7)
MCV RBC AUTO: 95.6 FL (ref 79–97)
PLATELET # BLD AUTO: 217 10*3/MM3 (ref 140–450)
PMV BLD AUTO: 9.6 FL (ref 6–12)
POTASSIUM SERPL-SCNC: 4.1 MMOL/L (ref 3.5–5.2)
RBC # BLD AUTO: 4.29 10*6/MM3 (ref 3.77–5.28)
SODIUM SERPL-SCNC: 140 MMOL/L (ref 136–145)
WBC NRBC COR # BLD: 8.56 10*3/MM3 (ref 3.4–10.8)

## 2023-03-22 PROCEDURE — 36415 COLL VENOUS BLD VENIPUNCTURE: CPT

## 2023-03-22 PROCEDURE — 80048 BASIC METABOLIC PNL TOTAL CA: CPT

## 2023-03-22 PROCEDURE — 85027 COMPLETE CBC AUTOMATED: CPT

## 2023-03-22 RX ORDER — HYDROCODONE BITARTRATE AND ACETAMINOPHEN 5; 325 MG/1; MG/1
TABLET ORAL SEE ADMIN INSTRUCTIONS
COMMUNITY
Start: 2023-03-16

## 2023-03-22 RX ORDER — PROMETHAZINE HYDROCHLORIDE 25 MG/1
1 TABLET ORAL EVERY 12 HOURS SCHEDULED
COMMUNITY
Start: 2023-03-16

## 2023-03-22 RX ORDER — CHLORHEXIDINE GLUCONATE 500 MG/1
CLOTH TOPICAL TAKE AS DIRECTED
COMMUNITY

## 2023-03-22 RX ORDER — DILTIAZEM HYDROCHLORIDE 60 MG/1
1 TABLET, FILM COATED ORAL EVERY 12 HOURS SCHEDULED
COMMUNITY
Start: 2023-02-08 | End: 2023-03-22

## 2023-03-22 RX ORDER — CEPHALEXIN 500 MG/1
CAPSULE ORAL
COMMUNITY
Start: 2023-03-16

## 2023-03-22 RX ORDER — DIAZEPAM 2 MG/1
1 TABLET ORAL 3 TIMES DAILY
COMMUNITY
Start: 2023-03-16

## 2023-03-22 NOTE — DISCHARGE INSTRUCTIONS
Take the following medications the morning of surgery: SPIRIVA INHALER, OMEPRAZOLE, CARDIZEM, FLUDROCORTISONE    ARRIVE AT 5:15 AM.      If you are on prescription narcotic pain medication to control your pain you may also take that medication the morning of surgery.    General Instructions:  Do not eat solid food after midnight the night before surgery.  You may drink clear liquids day of surgery but must stop at least one hour before your hospital arrival time.  CUTOFF TIME IS 4:15 AM.  It is beneficial for you to have a clear drink that contains carbohydrates the day of surgery.  We suggest a 12 to 20 ounce bottle of Gatorade or Powerade for non-diabetic patients or a 12 to 20 ounce bottle of G2 or Powerade Zero for diabetic patients. (Pediatric patients, are not advised to drink a 12 to 20 ounce carbohydrate drink)    Clear liquids are liquids you can see through.  Nothing red in color.     Plain water                               Sports drinks  Sodas                                   Gelatin (Jell-O)  Fruit juices without pulp such as white grape juice and apple juice  Popsicles that contain no fruit or yogurt  Tea or coffee (no cream or milk added)  Gatorade / Powerade  G2 / Powerade Zero    Infants may have breast milk up to four hours before surgery.  Infants drinking formula may drink formula up to six hours before surgery.   Patients who avoid smoking, chewing tobacco and alcohol for 4 weeks prior to surgery have a reduced risk of post-operative complications.  Quit smoking as many days before surgery as you can.  Do not smoke, use chewing tobacco or drink alcohol the day of surgery.   If applicable bring your C-PAP/ BI-PAP machine.  Bring any papers given to you in the doctor’s office.  Wear clean comfortable clothes.  Do not wear contact lenses, false eyelashes or make-up.  Bring a case for your glasses.   Bring crutches or walker if applicable.  Remove all piercings.  Leave jewelry and any other  valuables at home.  Hair extensions with metal clips must be removed prior to surgery.  The Pre-Admission Testing nurse will instruct you to bring medications if unable to obtain an accurate list in Pre-Admission Testing.        If you were given a blood bank ID arm band remember to bring it with you the day of surgery.    Preventing a Surgical Site Infection:  For 2 to 3 days before surgery, avoid shaving with a razor because the razor can irritate skin and make it easier to develop an infection.    Any areas of open skin can increase the risk of a post-operative wound infection by allowing bacteria to enter and travel throughout the body.  Notify your surgeon if you have any skin wounds / rashes even if it is not near the expected surgical site.  The area will need assessed to determine if surgery should be delayed until it is healed.  The night prior to surgery shower using a fresh bar of anti-bacterial soap (such as Dial) and clean washcloth.  Sleep in a clean bed with clean clothing.  Do not allow pets to sleep with you.  Shower on the morning of surgery using a fresh bar of anti-bacterial soap (such as Dial) and clean washcloth.  Dry with a clean towel and dress in clean clothing.    CHLORHEXIDINE CLOTH INSTRUCTIONS  The morning of surgery follow these instructions using the Chlorhexidine cloths you've been given.  These steps reduce bacteria on the body.  Do not use the cloths near your eyes, ears mouth, genitalia or on open wounds.  Throw the cloths away after use but do not try to flush them down a toilet.      Open and remove one cloth at a time from the package.    Leave the cloth unfolded and begin the bathing.  Massage the skin with the cloths using gentle pressure to remove bacteria.  Do not scrub harshly.   Follow the steps below with one 2% CHG cloth per area (6 total cloths).  One cloth for neck, shoulders and chest.  One cloth for both arms, hands, fingers and underarms (do underarms last).  One  cloth for the abdomen followed by groin.  One cloth for right leg and foot including between the toes.  One cloth for left leg and foot including between the toes.  The last cloth is to be used for the back of the neck, back and buttocks.    Allow the CHG to air dry 3 minutes on the skin which will give it time to work and decrease the chance of irritation.  The skin may feel sticky until it is dry.  Do not rinse with water or any other liquid or you will lose the beneficial effects of the CHG.  If mild skin irritation occurs, do rinse the skin to remove the CHG.  Report this to the nurse at time of admission.  Do not apply lotions, creams, ointments, deodorants or perfumes after using the cloths. Dress in clean clothes before coming to the hospital.   Ask your surgeon if you will be receiving antibiotics prior to surgery.  Make sure you, your family, and all healthcare providers clean their hands with soap and water or an alcohol based hand  before caring for you or your wound.    Day of surgery:  Your arrival time is approximately two hours before your scheduled surgery time.  Upon arrival, a Pre-op nurse and Anesthesiologist will review your health history, obtain vital signs, and answer questions you may have.  The only belongings needed at this time will be a list of your home medications and if applicable your C-PAP/BI-PAP machine.  A Pre-op nurse will start an IV and you may receive medication in preparation for surgery, including something to help you relax.     Please be aware that surgery does come with discomfort.  We want to make every effort to control your discomfort so please discuss any uncontrolled symptoms with your nurse.   Your doctor will most likely have prescribed pain medications.      If you are going home after surgery you will receive individualized written care instructions before being discharged.  A responsible adult must drive you to and from the hospital on the day of your  surgery and stay with you for 24 hours.  Discharge prescriptions can be filled by the hospital pharmacy during regular pharmacy hours.  If you are having surgery late in the day/evening your prescription may be e-prescribed to your pharmacy.  Please verify your pharmacy hours or chose a 24 hour pharmacy to avoid not having access to your prescription because your pharmacy has closed for the day.    If you are staying overnight following surgery, you will be transported to your hospital room following the recovery period.  [facility] has all private rooms.    If you have any questions please call Pre-Admission Testing at [phone].  Deductibles and co-payments are collected on the day of service. Please be prepared to pay the required co-pay, deductible or deposit on the day of service as defined by your plan.    Call your surgeon immediately if you experience any of the following symptoms:  Sore Throat  Shortness of Breath or difficulty breathing  Cough  Chills  Body soreness or muscle pain  Headache  Fever  New loss of taste or smell  Do not arrive for your surgery ill.  Your procedure will need to be rescheduled to another time.  You will need to call your physician before the day of surgery to avoid any unnecessary exposure to hospital staff as well as other patients.

## 2023-03-22 NOTE — PROGRESS NOTES
Ms. Wilson called with questions about FMLA. We discussed next steps and she will have her  talk to his HR department to figure out their policy and what he needs regarding paperwork.

## 2023-03-22 NOTE — PROGRESS NOTES
Patient presented to the office for a blood draw for genetic testing. Specimen was obtained, labeled and sent to [facility] via [employer].

## 2023-03-29 ENCOUNTER — HOSPITAL ENCOUNTER (OUTPATIENT)
Dept: NUCLEAR MEDICINE | Facility: HOSPITAL | Age: 48
Discharge: HOME | End: 2023-03-29
Payer: OTHER GOVERNMENT

## 2023-03-29 ENCOUNTER — ANESTHESIA EVENT (OUTPATIENT)
Dept: PERIOP | Facility: HOSPITAL | Age: 48
End: 2023-03-29
Payer: OTHER GOVERNMENT

## 2023-03-29 ENCOUNTER — HOSPITAL ENCOUNTER (OUTPATIENT)
Facility: HOSPITAL | Age: 48
Discharge: HOME | End: 2023-03-30
Attending: STUDENT IN AN ORGANIZED HEALTH CARE EDUCATION/TRAINING PROGRAM | Admitting: STUDENT IN AN ORGANIZED HEALTH CARE EDUCATION/TRAINING PROGRAM
Payer: OTHER GOVERNMENT

## 2023-03-29 ENCOUNTER — ANESTHESIA (OUTPATIENT)
Dept: PERIOP | Facility: HOSPITAL | Age: 48
End: 2023-03-29
Payer: OTHER GOVERNMENT

## 2023-03-29 DIAGNOSIS — D05.11 DUCTAL CARCINOMA IN SITU (DCIS) OF RIGHT BREAST: ICD-10-CM

## 2023-03-29 PROCEDURE — 88307 TISSUE EXAM BY PATHOLOGIST: CPT | Performed by: STUDENT IN AN ORGANIZED HEALTH CARE EDUCATION/TRAINING PROGRAM

## 2023-03-29 PROCEDURE — C1789 PROSTHESIS, BREAST, IMP: HCPCS | Performed by: STUDENT IN AN ORGANIZED HEALTH CARE EDUCATION/TRAINING PROGRAM

## 2023-03-29 PROCEDURE — 19303 MAST SIMPLE COMPLETE: CPT | Performed by: PHYSICIAN ASSISTANT

## 2023-03-29 PROCEDURE — 25010000002 HYDROMORPHONE 1 MG/ML SOLUTION: Performed by: ANESTHESIOLOGY

## 2023-03-29 PROCEDURE — 38792 RA TRACER ID OF SENTINL NODE: CPT

## 2023-03-29 PROCEDURE — 25010000002 ISOSULFAN BLUE 1 % SOLUTION: Performed by: STUDENT IN AN ORGANIZED HEALTH CARE EDUCATION/TRAINING PROGRAM

## 2023-03-29 PROCEDURE — 25010000002 DEXAMETHASONE PER 1 MG: Performed by: ANESTHESIOLOGY

## 2023-03-29 PROCEDURE — A9520 TC99 TILMANOCEPT DIAG 0.5MCI: HCPCS | Performed by: STUDENT IN AN ORGANIZED HEALTH CARE EDUCATION/TRAINING PROGRAM

## 2023-03-29 PROCEDURE — 94799 UNLISTED PULMONARY SVC/PX: CPT

## 2023-03-29 PROCEDURE — 25010000002 PROPOFOL 10 MG/ML EMULSION: Performed by: ANESTHESIOLOGY

## 2023-03-29 PROCEDURE — 25010000002 HYDROMORPHONE PER 4 MG: Performed by: ANESTHESIOLOGY

## 2023-03-29 PROCEDURE — 25010000002 GENTAMICIN PER 80 MG: Performed by: PLASTIC SURGERY

## 2023-03-29 PROCEDURE — 25010000002 CEFAZOLIN IN DEXTROSE 2-4 GM/100ML-% SOLUTION: Performed by: STUDENT IN AN ORGANIZED HEALTH CARE EDUCATION/TRAINING PROGRAM

## 2023-03-29 PROCEDURE — 25010000002 FENTANYL CITRATE (PF) 50 MCG/ML SOLUTION: Performed by: ANESTHESIOLOGY

## 2023-03-29 PROCEDURE — 94640 AIRWAY INHALATION TREATMENT: CPT

## 2023-03-29 PROCEDURE — 25010000002 PHENYLEPHRINE 10 MG/ML SOLUTION: Performed by: ANESTHESIOLOGY

## 2023-03-29 PROCEDURE — G0378 HOSPITAL OBSERVATION PER HR: HCPCS

## 2023-03-29 PROCEDURE — 63710000001 PROMETHAZINE PER 25 MG: Performed by: STUDENT IN AN ORGANIZED HEALTH CARE EDUCATION/TRAINING PROGRAM

## 2023-03-29 PROCEDURE — 38900 IO MAP OF SENT LYMPH NODE: CPT | Performed by: STUDENT IN AN ORGANIZED HEALTH CARE EDUCATION/TRAINING PROGRAM

## 2023-03-29 PROCEDURE — 19303 MAST SIMPLE COMPLETE: CPT | Performed by: STUDENT IN AN ORGANIZED HEALTH CARE EDUCATION/TRAINING PROGRAM

## 2023-03-29 PROCEDURE — 0 TECHETIUM TC99M TILMANOCEPT: Performed by: STUDENT IN AN ORGANIZED HEALTH CARE EDUCATION/TRAINING PROGRAM

## 2023-03-29 PROCEDURE — 38525 BIOPSY/REMOVAL LYMPH NODES: CPT | Performed by: STUDENT IN AN ORGANIZED HEALTH CARE EDUCATION/TRAINING PROGRAM

## 2023-03-29 PROCEDURE — 25010000002 ONDANSETRON PER 1 MG: Performed by: ANESTHESIOLOGY

## 2023-03-29 DEVICE — GRFT TISS ALLODERM RTM 320SQ/CM MD PERF 1.6TO0.4MM: Type: IMPLANTABLE DEVICE | Site: BREAST | Status: FUNCTIONAL

## 2023-03-29 DEVICE — KNOTLESS TISSUE CONTROL DEVICE, UNDYED UNIDIRECTIONAL (ANTIBACTERIAL) SYNTHETIC ABSORBABLE DEVICE
Type: IMPLANTABLE DEVICE | Site: BREAST | Status: FUNCTIONAL
Brand: STRATAFIX

## 2023-03-29 DEVICE — EXPNDR BRST ALLOX2 F/HT SMOTH 600TO720CC: Type: IMPLANTABLE DEVICE | Site: BREAST | Status: FUNCTIONAL

## 2023-03-29 DEVICE — LIGACLIP MCA MULTIPLE CLIP APPLIERS, 20 MEDIUM CLIPS
Type: IMPLANTABLE DEVICE | Site: BREAST | Status: FUNCTIONAL
Brand: LIGACLIP

## 2023-03-29 RX ORDER — HYDROCODONE BITARTRATE AND ACETAMINOPHEN 5; 325 MG/1; MG/1
1 TABLET ORAL EVERY 6 HOURS PRN
Status: DISCONTINUED | OUTPATIENT
Start: 2023-03-29 | End: 2023-03-30 | Stop reason: HOSPADM

## 2023-03-29 RX ORDER — EPHEDRINE SULFATE 50 MG/ML
INJECTION, SOLUTION INTRAVENOUS AS NEEDED
Status: DISCONTINUED | OUTPATIENT
Start: 2023-03-29 | End: 2023-03-29 | Stop reason: SURG

## 2023-03-29 RX ORDER — DILTIAZEM HYDROCHLORIDE 120 MG/1
120 CAPSULE, COATED, EXTENDED RELEASE ORAL
Status: DISCONTINUED | OUTPATIENT
Start: 2023-03-29 | End: 2023-03-30 | Stop reason: HOSPADM

## 2023-03-29 RX ORDER — FENTANYL CITRATE 50 UG/ML
INJECTION, SOLUTION INTRAMUSCULAR; INTRAVENOUS AS NEEDED
Status: DISCONTINUED | OUTPATIENT
Start: 2023-03-29 | End: 2023-03-29 | Stop reason: SURG

## 2023-03-29 RX ORDER — PROMETHAZINE HYDROCHLORIDE 25 MG/1
25 SUPPOSITORY RECTAL ONCE AS NEEDED
Status: DISCONTINUED | OUTPATIENT
Start: 2023-03-29 | End: 2023-03-29 | Stop reason: HOSPADM

## 2023-03-29 RX ORDER — OXYCODONE AND ACETAMINOPHEN 7.5; 325 MG/1; MG/1
1 TABLET ORAL EVERY 4 HOURS PRN
Status: DISCONTINUED | OUTPATIENT
Start: 2023-03-29 | End: 2023-03-29 | Stop reason: HOSPADM

## 2023-03-29 RX ORDER — DROPERIDOL 2.5 MG/ML
0.62 INJECTION, SOLUTION INTRAMUSCULAR; INTRAVENOUS
Status: DISCONTINUED | OUTPATIENT
Start: 2023-03-29 | End: 2023-03-29 | Stop reason: HOSPADM

## 2023-03-29 RX ORDER — TRAZODONE HYDROCHLORIDE 50 MG/1
50 TABLET ORAL NIGHTLY
Status: DISCONTINUED | OUTPATIENT
Start: 2023-03-29 | End: 2023-03-30 | Stop reason: HOSPADM

## 2023-03-29 RX ORDER — MAGNESIUM HYDROXIDE 1200 MG/15ML
LIQUID ORAL AS NEEDED
Status: DISCONTINUED | OUTPATIENT
Start: 2023-03-29 | End: 2023-03-29 | Stop reason: HOSPADM

## 2023-03-29 RX ORDER — FENTANYL CITRATE 50 UG/ML
50 INJECTION, SOLUTION INTRAMUSCULAR; INTRAVENOUS
Status: DISCONTINUED | OUTPATIENT
Start: 2023-03-29 | End: 2023-03-29 | Stop reason: HOSPADM

## 2023-03-29 RX ORDER — ONDANSETRON 2 MG/ML
4 INJECTION INTRAMUSCULAR; INTRAVENOUS ONCE AS NEEDED
Status: DISCONTINUED | OUTPATIENT
Start: 2023-03-29 | End: 2023-03-29 | Stop reason: HOSPADM

## 2023-03-29 RX ORDER — ONDANSETRON 2 MG/ML
4 INJECTION INTRAMUSCULAR; INTRAVENOUS EVERY 6 HOURS PRN
Status: DISCONTINUED | OUTPATIENT
Start: 2023-03-29 | End: 2023-03-30 | Stop reason: HOSPADM

## 2023-03-29 RX ORDER — CEFAZOLIN SODIUM 2 G/100ML
2 INJECTION, SOLUTION INTRAVENOUS ONCE
Status: COMPLETED | OUTPATIENT
Start: 2023-03-29 | End: 2023-03-29

## 2023-03-29 RX ORDER — DIAZEPAM 5 MG/1
10 TABLET ORAL ONCE
Status: COMPLETED | OUTPATIENT
Start: 2023-03-29 | End: 2023-03-29

## 2023-03-29 RX ORDER — LIDOCAINE HYDROCHLORIDE 20 MG/ML
INJECTION, SOLUTION INFILTRATION; PERINEURAL AS NEEDED
Status: DISCONTINUED | OUTPATIENT
Start: 2023-03-29 | End: 2023-03-29 | Stop reason: SURG

## 2023-03-29 RX ORDER — HYDROMORPHONE HYDROCHLORIDE 1 MG/ML
0.5 INJECTION, SOLUTION INTRAMUSCULAR; INTRAVENOUS; SUBCUTANEOUS
Status: DISCONTINUED | OUTPATIENT
Start: 2023-03-29 | End: 2023-03-29 | Stop reason: HOSPADM

## 2023-03-29 RX ORDER — SODIUM CHLORIDE 0.9 % (FLUSH) 0.9 %
3-10 SYRINGE (ML) INJECTION AS NEEDED
Status: DISCONTINUED | OUTPATIENT
Start: 2023-03-29 | End: 2023-03-29 | Stop reason: HOSPADM

## 2023-03-29 RX ORDER — PHENYLEPHRINE HYDROCHLORIDE 10 MG/ML
INJECTION INTRAVENOUS AS NEEDED
Status: DISCONTINUED | OUTPATIENT
Start: 2023-03-29 | End: 2023-03-29 | Stop reason: SURG

## 2023-03-29 RX ORDER — SERTRALINE HYDROCHLORIDE 100 MG/1
100 TABLET, FILM COATED ORAL DAILY
Status: DISCONTINUED | OUTPATIENT
Start: 2023-03-29 | End: 2023-03-30 | Stop reason: HOSPADM

## 2023-03-29 RX ORDER — PANTOPRAZOLE SODIUM 40 MG/1
40 TABLET, DELAYED RELEASE ORAL
Status: DISCONTINUED | OUTPATIENT
Start: 2023-03-30 | End: 2023-03-30 | Stop reason: HOSPADM

## 2023-03-29 RX ORDER — HYDRALAZINE HYDROCHLORIDE 20 MG/ML
5 INJECTION INTRAMUSCULAR; INTRAVENOUS
Status: DISCONTINUED | OUTPATIENT
Start: 2023-03-29 | End: 2023-03-29 | Stop reason: HOSPADM

## 2023-03-29 RX ORDER — DIAZEPAM 2 MG/1
2 TABLET ORAL EVERY 8 HOURS SCHEDULED
Status: DISCONTINUED | OUTPATIENT
Start: 2023-03-29 | End: 2023-03-30 | Stop reason: HOSPADM

## 2023-03-29 RX ORDER — SODIUM CHLORIDE 0.9 % (FLUSH) 0.9 %
10 SYRINGE (ML) INJECTION EVERY 12 HOURS SCHEDULED
Status: DISCONTINUED | OUTPATIENT
Start: 2023-03-29 | End: 2023-03-30 | Stop reason: HOSPADM

## 2023-03-29 RX ORDER — LIDOCAINE HYDROCHLORIDE 40 MG/ML
SOLUTION TOPICAL AS NEEDED
Status: DISCONTINUED | OUTPATIENT
Start: 2023-03-29 | End: 2023-03-29 | Stop reason: SURG

## 2023-03-29 RX ORDER — NALOXONE HCL 0.4 MG/ML
0.2 VIAL (ML) INJECTION AS NEEDED
Status: DISCONTINUED | OUTPATIENT
Start: 2023-03-29 | End: 2023-03-29 | Stop reason: HOSPADM

## 2023-03-29 RX ORDER — FLUDROCORTISONE ACETATE 0.1 MG/1
0.2 TABLET ORAL DAILY
Status: DISCONTINUED | OUTPATIENT
Start: 2023-03-29 | End: 2023-03-30 | Stop reason: HOSPADM

## 2023-03-29 RX ORDER — SODIUM CHLORIDE 9 MG/ML
INJECTION, SOLUTION INTRAVENOUS AS NEEDED
Status: DISCONTINUED | OUTPATIENT
Start: 2023-03-29 | End: 2023-03-29 | Stop reason: HOSPADM

## 2023-03-29 RX ORDER — ISOSULFAN BLUE 50 MG/5ML
INJECTION, SOLUTION SUBCUTANEOUS AS NEEDED
Status: DISCONTINUED | OUTPATIENT
Start: 2023-03-29 | End: 2023-03-29 | Stop reason: HOSPADM

## 2023-03-29 RX ORDER — DEXAMETHASONE SODIUM PHOSPHATE 4 MG/ML
INJECTION, SOLUTION INTRA-ARTICULAR; INTRALESIONAL; INTRAMUSCULAR; INTRAVENOUS; SOFT TISSUE AS NEEDED
Status: DISCONTINUED | OUTPATIENT
Start: 2023-03-29 | End: 2023-03-29 | Stop reason: SURG

## 2023-03-29 RX ORDER — PROMETHAZINE HYDROCHLORIDE 25 MG/1
25 TABLET ORAL ONCE AS NEEDED
Status: DISCONTINUED | OUTPATIENT
Start: 2023-03-29 | End: 2023-03-29 | Stop reason: HOSPADM

## 2023-03-29 RX ORDER — PROPOFOL 10 MG/ML
VIAL (ML) INTRAVENOUS AS NEEDED
Status: DISCONTINUED | OUTPATIENT
Start: 2023-03-29 | End: 2023-03-29 | Stop reason: SURG

## 2023-03-29 RX ORDER — LIDOCAINE HYDROCHLORIDE 10 MG/ML
0.5 INJECTION, SOLUTION EPIDURAL; INFILTRATION; INTRACAUDAL; PERINEURAL ONCE AS NEEDED
Status: DISCONTINUED | OUTPATIENT
Start: 2023-03-29 | End: 2023-03-29 | Stop reason: HOSPADM

## 2023-03-29 RX ORDER — SODIUM CHLORIDE 0.9 % (FLUSH) 0.9 %
10 SYRINGE (ML) INJECTION AS NEEDED
Status: DISCONTINUED | OUTPATIENT
Start: 2023-03-29 | End: 2023-03-30 | Stop reason: HOSPADM

## 2023-03-29 RX ORDER — LIDOCAINE AND PRILOCAINE 25; 25 MG/G; MG/G
1 CREAM TOPICAL ONCE
Status: COMPLETED | OUTPATIENT
Start: 2023-03-29 | End: 2023-03-29

## 2023-03-29 RX ORDER — SODIUM CHLORIDE, SODIUM LACTATE, POTASSIUM CHLORIDE, CALCIUM CHLORIDE 600; 310; 30; 20 MG/100ML; MG/100ML; MG/100ML; MG/100ML
9 INJECTION, SOLUTION INTRAVENOUS CONTINUOUS
Status: DISCONTINUED | OUTPATIENT
Start: 2023-03-29 | End: 2023-03-30 | Stop reason: HOSPADM

## 2023-03-29 RX ORDER — SODIUM CHLORIDE 0.9 % (FLUSH) 0.9 %
3 SYRINGE (ML) INJECTION EVERY 12 HOURS SCHEDULED
Status: DISCONTINUED | OUTPATIENT
Start: 2023-03-29 | End: 2023-03-29 | Stop reason: HOSPADM

## 2023-03-29 RX ORDER — SCOLOPAMINE TRANSDERMAL SYSTEM 1 MG/1
1 PATCH, EXTENDED RELEASE TRANSDERMAL ONCE
Status: DISCONTINUED | OUTPATIENT
Start: 2023-03-29 | End: 2023-03-29

## 2023-03-29 RX ORDER — LABETALOL HYDROCHLORIDE 5 MG/ML
5 INJECTION, SOLUTION INTRAVENOUS
Status: DISCONTINUED | OUTPATIENT
Start: 2023-03-29 | End: 2023-03-29 | Stop reason: HOSPADM

## 2023-03-29 RX ORDER — ROCURONIUM BROMIDE 10 MG/ML
INJECTION, SOLUTION INTRAVENOUS AS NEEDED
Status: DISCONTINUED | OUTPATIENT
Start: 2023-03-29 | End: 2023-03-29 | Stop reason: SURG

## 2023-03-29 RX ORDER — MIDAZOLAM HYDROCHLORIDE 1 MG/ML
1 INJECTION INTRAMUSCULAR; INTRAVENOUS
Status: DISCONTINUED | OUTPATIENT
Start: 2023-03-29 | End: 2023-03-29 | Stop reason: HOSPADM

## 2023-03-29 RX ORDER — BISOPROLOL FUMARATE 5 MG/1
2.5 TABLET, FILM COATED ORAL
Status: DISCONTINUED | OUTPATIENT
Start: 2023-03-29 | End: 2023-03-30 | Stop reason: HOSPADM

## 2023-03-29 RX ORDER — IPRATROPIUM BROMIDE AND ALBUTEROL SULFATE 2.5; .5 MG/3ML; MG/3ML
3 SOLUTION RESPIRATORY (INHALATION) ONCE AS NEEDED
Status: DISCONTINUED | OUTPATIENT
Start: 2023-03-29 | End: 2023-03-29 | Stop reason: HOSPADM

## 2023-03-29 RX ORDER — KETAMINE HCL IN NACL, ISO-OSM 100MG/10ML
SYRINGE (ML) INJECTION AS NEEDED
Status: DISCONTINUED | OUTPATIENT
Start: 2023-03-29 | End: 2023-03-29 | Stop reason: SURG

## 2023-03-29 RX ORDER — HYDROCODONE BITARTRATE AND ACETAMINOPHEN 7.5; 325 MG/1; MG/1
1 TABLET ORAL ONCE AS NEEDED
Status: DISCONTINUED | OUTPATIENT
Start: 2023-03-29 | End: 2023-03-29 | Stop reason: HOSPADM

## 2023-03-29 RX ORDER — FLUMAZENIL 0.1 MG/ML
0.2 INJECTION INTRAVENOUS AS NEEDED
Status: DISCONTINUED | OUTPATIENT
Start: 2023-03-29 | End: 2023-03-29 | Stop reason: HOSPADM

## 2023-03-29 RX ORDER — DIPHENHYDRAMINE HYDROCHLORIDE 50 MG/ML
12.5 INJECTION INTRAMUSCULAR; INTRAVENOUS
Status: DISCONTINUED | OUTPATIENT
Start: 2023-03-29 | End: 2023-03-29 | Stop reason: HOSPADM

## 2023-03-29 RX ORDER — FAMOTIDINE 10 MG/ML
20 INJECTION, SOLUTION INTRAVENOUS ONCE
Status: COMPLETED | OUTPATIENT
Start: 2023-03-29 | End: 2023-03-29

## 2023-03-29 RX ORDER — ONDANSETRON 2 MG/ML
INJECTION INTRAMUSCULAR; INTRAVENOUS AS NEEDED
Status: DISCONTINUED | OUTPATIENT
Start: 2023-03-29 | End: 2023-03-29 | Stop reason: SURG

## 2023-03-29 RX ORDER — PROMETHAZINE HYDROCHLORIDE 25 MG/1
25 TABLET ORAL EVERY 12 HOURS SCHEDULED
Status: DISCONTINUED | OUTPATIENT
Start: 2023-03-29 | End: 2023-03-30 | Stop reason: HOSPADM

## 2023-03-29 RX ORDER — BUPIVACAINE HYDROCHLORIDE 5 MG/ML
INJECTION, SOLUTION EPIDURAL; INTRACAUDAL AS NEEDED
Status: DISCONTINUED | OUTPATIENT
Start: 2023-03-29 | End: 2023-03-29 | Stop reason: HOSPADM

## 2023-03-29 RX ORDER — SODIUM CHLORIDE 9 MG/ML
40 INJECTION, SOLUTION INTRAVENOUS AS NEEDED
Status: DISCONTINUED | OUTPATIENT
Start: 2023-03-29 | End: 2023-03-30 | Stop reason: HOSPADM

## 2023-03-29 RX ORDER — HYDROMORPHONE HYDROCHLORIDE 1 MG/ML
0.5 INJECTION, SOLUTION INTRAMUSCULAR; INTRAVENOUS; SUBCUTANEOUS
Status: DISCONTINUED | OUTPATIENT
Start: 2023-03-29 | End: 2023-03-30 | Stop reason: HOSPADM

## 2023-03-29 RX ORDER — EPHEDRINE SULFATE 50 MG/ML
5 INJECTION, SOLUTION INTRAVENOUS ONCE AS NEEDED
Status: DISCONTINUED | OUTPATIENT
Start: 2023-03-29 | End: 2023-03-29 | Stop reason: HOSPADM

## 2023-03-29 RX ADMIN — ROCURONIUM BROMIDE 50 MG: 50 INJECTION INTRAVENOUS at 08:17

## 2023-03-29 RX ADMIN — LIDOCAINE HYDROCHLORIDE 1 EACH: 40 SOLUTION TOPICAL at 08:22

## 2023-03-29 RX ADMIN — DIAZEPAM 2 MG: 2 TABLET ORAL at 21:10

## 2023-03-29 RX ADMIN — PROPOFOL 25 MCG/KG/MIN: 10 INJECTION, EMULSION INTRAVENOUS at 08:30

## 2023-03-29 RX ADMIN — PROPOFOL 200 MG: 10 INJECTION, EMULSION INTRAVENOUS at 08:17

## 2023-03-29 RX ADMIN — HYDROCODONE BITARTRATE AND ACETAMINOPHEN 1 TABLET: 5; 325 TABLET ORAL at 15:06

## 2023-03-29 RX ADMIN — TILMANOCEPT 1 DOSE: KIT at 07:28

## 2023-03-29 RX ADMIN — HYDROMORPHONE HYDROCHLORIDE 0.5 MG: 1 INJECTION, SOLUTION INTRAMUSCULAR; INTRAVENOUS; SUBCUTANEOUS at 12:53

## 2023-03-29 RX ADMIN — FENTANYL CITRATE 50 MCG: 50 INJECTION, SOLUTION INTRAMUSCULAR; INTRAVENOUS at 09:06

## 2023-03-29 RX ADMIN — Medication 30 MG: at 09:34

## 2023-03-29 RX ADMIN — Medication 10 MG: at 10:55

## 2023-03-29 RX ADMIN — LIDOCAINE AND PRILOCAINE 1 APPLICATION: 25; 25 CREAM TOPICAL at 06:20

## 2023-03-29 RX ADMIN — SCOPALAMINE 1 PATCH: 1 PATCH, EXTENDED RELEASE TRANSDERMAL at 07:34

## 2023-03-29 RX ADMIN — ROCURONIUM BROMIDE 10 MG: 50 INJECTION INTRAVENOUS at 10:00

## 2023-03-29 RX ADMIN — Medication 10 ML: at 20:57

## 2023-03-29 RX ADMIN — CEFAZOLIN SODIUM 2 G: 2 INJECTION, SOLUTION INTRAVENOUS at 08:04

## 2023-03-29 RX ADMIN — PHENYLEPHRINE HYDROCHLORIDE 50 MCG: 10 INJECTION, SOLUTION INTRAVENOUS at 08:51

## 2023-03-29 RX ADMIN — SODIUM CHLORIDE, POTASSIUM CHLORIDE, SODIUM LACTATE AND CALCIUM CHLORIDE 9 ML/HR: 600; 310; 30; 20 INJECTION, SOLUTION INTRAVENOUS at 07:34

## 2023-03-29 RX ADMIN — ROCURONIUM BROMIDE 10 MG: 50 INJECTION INTRAVENOUS at 09:06

## 2023-03-29 RX ADMIN — DIAZEPAM 2 MG: 2 TABLET ORAL at 17:03

## 2023-03-29 RX ADMIN — HYDROMORPHONE HYDROCHLORIDE 0.5 MG: 1 INJECTION, SOLUTION INTRAMUSCULAR; INTRAVENOUS; SUBCUTANEOUS at 11:38

## 2023-03-29 RX ADMIN — FENTANYL CITRATE 50 MCG: 0.05 INJECTION, SOLUTION INTRAMUSCULAR; INTRAVENOUS at 12:50

## 2023-03-29 RX ADMIN — PROMETHAZINE HYDROCHLORIDE 25 MG: 25 TABLET ORAL at 21:10

## 2023-03-29 RX ADMIN — ROCURONIUM BROMIDE 10 MG: 50 INJECTION INTRAVENOUS at 09:32

## 2023-03-29 RX ADMIN — FAMOTIDINE 20 MG: 10 INJECTION, SOLUTION INTRAVENOUS at 07:33

## 2023-03-29 RX ADMIN — ONDANSETRON 4 MG: 2 INJECTION INTRAMUSCULAR; INTRAVENOUS at 11:20

## 2023-03-29 RX ADMIN — TRAZODONE HYDROCHLORIDE 50 MG: 50 TABLET ORAL at 20:55

## 2023-03-29 RX ADMIN — ROCURONIUM BROMIDE 20 MG: 50 INJECTION INTRAVENOUS at 10:55

## 2023-03-29 RX ADMIN — TIOTROPIUM BROMIDE INHALATION SPRAY 2 PUFF: 3.12 SPRAY, METERED RESPIRATORY (INHALATION) at 21:03

## 2023-03-29 RX ADMIN — EPHEDRINE SULFATE 10 MG: 50 INJECTION INTRAVENOUS at 08:23

## 2023-03-29 RX ADMIN — TOPIRAMATE 75 MG: 25 TABLET, FILM COATED ORAL at 20:55

## 2023-03-29 RX ADMIN — Medication 10 MG: at 10:31

## 2023-03-29 RX ADMIN — SUGAMMADEX 200 MG: 100 INJECTION, SOLUTION INTRAVENOUS at 11:20

## 2023-03-29 RX ADMIN — DEXAMETHASONE SODIUM PHOSPHATE 8 MG: 4 INJECTION, SOLUTION INTRA-ARTICULAR; INTRALESIONAL; INTRAMUSCULAR; INTRAVENOUS; SOFT TISSUE at 08:49

## 2023-03-29 RX ADMIN — FENTANYL CITRATE 50 MCG: 50 INJECTION, SOLUTION INTRAMUSCULAR; INTRAVENOUS at 08:17

## 2023-03-29 RX ADMIN — DIAZEPAM 10 MG: 5 TABLET ORAL at 06:20

## 2023-03-29 RX ADMIN — EPHEDRINE SULFATE 10 MG: 50 INJECTION INTRAVENOUS at 08:51

## 2023-03-29 RX ADMIN — LIDOCAINE HYDROCHLORIDE 100 MG: 20 INJECTION, SOLUTION INFILTRATION; PERINEURAL at 08:17

## 2023-03-29 NOTE — PLAN OF CARE
Goal Outcome Evaluation:  Plan of Care Reviewed With: patient        Progress: no change  Outcome Evaluation: received pt from PACU, vss, bilateral chest incisions intact, dressing CDI, KERRY x4 with small amt of serosanguinous drainage, PO pain medication given, tolerated regular diet without n/v, voided without difficulty, iv saline locked, scds on

## 2023-03-29 NOTE — BRIEF OP NOTE
BREAST RECONSTRUCTION IMMEDIATE WITH TISSUE EXPANDER INSERTION  Progress Note    Dolores Oleg  3/29/2023    Pre-op Diagnosis:   Ductal carcinoma in situ (DCIS) of right breast [D05.11]       Post-Op Diagnosis Codes:     * Ductal carcinoma in situ (DCIS) of right breast [D05.11]    Procedure/CPT® Codes:        Procedure(s):  bilateral skin sparing mastectomy, right sentinel lymph node biopsy  BILATERAL IMMEDIATE BREAST  RECONSTRUCTION  WITH TISSUE EXPANDERS AND BIOLOGIC        Surgeon(s):  Dasia Marroquin MD Palazzo, Michelle D, MD    Anesthesia: General    Staff:   Circulator: Santa Clemons RN; Carin Prado RN  Scrub Person: Marian Schofield  Assistant: Kelly Barry PA-C  Assistant: Klely Barry PA-C      Estimated Blood Loss: 10 cc    Urine Voided: 600 mL    Specimens:                Specimens     ID Source Type Tests Collected By Collected At Frozen?    A Breast, Left Tissue · TISSUE PATHOLOGY EXAM   Dasia Marroquin MD 3/29/23 0914 No    Description: LEFT BREAST STITCH AT 12 O'CLOCK. 718g FRESH FOR PERMANENT    B [place] Lymph Node Tissue · TISSUE PATHOLOGY EXAM   Dasia Marroquin MD 3/29/23 0956 No    Description: RIGHT SENTINEL LYMPH NODE #1. HOT/ BLUE COUNT 120.    C Breast, Right Tissue · TISSUE PATHOLOGY EXAM   Dasia Marroquin MD 3/29/23 0955 No    Description: RIGHT BREAST, STITCH AT 12 O'CLOCK. FRESH FOR PERMANENT     D [place] Lymph Node Tissue · TISSUE PATHOLOGY EXAM   Dasia Marroquin MD 3/29/23 0958 No    Description: RIGHT SENTINEL LYMPH NODE #2. HOT 35.                Drains:   Closed/Suction Drain 1 Left Breast Bulb 15 Fr. (Active)   Site Description Unable to view 03/29/23 1150   Dressing Status Clean;Dry;Intact 03/29/23 1150   Drainage Appearance Serosanguineous 03/29/23 1150   Status To bulb suction 03/29/23 1150       Closed/Suction Drain 2 Left Breast Bulb 15 Fr. (Active)   Site Description Unable to view 03/29/23 1150   Dressing Status Clean;Dry;Intact  03/29/23 1150   Drainage Appearance Serosanguineous 03/29/23 1150   Status To bulb suction 03/29/23 1150       Closed/Suction Drain 3 Right Breast Bulb 15 Fr. (Active)   Site Description Unable to view 03/29/23 1150   Dressing Status Clean;Dry;Intact 03/29/23 1150   Drainage Appearance Serosanguineous 03/29/23 1150   Status To bulb suction 03/29/23 1150       Closed/Suction Drain 4 Right Breast Bulb 15 Fr. (Active)   Site Description Unable to view 03/29/23 1150   Dressing Status Clean;Dry;Intact 03/29/23 1150   Drainage Appearance Serosanguineous 03/29/23 1150   Status To bulb suction 03/29/23 1150       [REMOVED] Urethral Catheter Silicone 16 Fr. (Removed)       Findings: prepectoral breast reconstruciton with alloderm 16x20 cm   Tissue expanders [id number] 0 fill         Complications: none        Georgia Baer MD     Date: 3/29/2023  Time: 12:17 EDT

## 2023-03-29 NOTE — H&P
Update: No changes to H&P. Plan for bilateral breast skin sparing mastectomy with R SLN biopsy.     General Surgery Breast Cancer History and Physical Exam      Summary:    Dolores Dejesus is a 47 y.o. lady who presents with a new diagnosis of right breast ductal carcinoma in situ: Grade I,  ER+/WI+, Ki-67 10%; cTisNxMx, Stage 0.       A multidisciplinary plan has been formulated for the patient:    (1) Breast Surgical Oncology:  -Follow up [facility] 9 panel genetic testing. I will call her with results.   -Nurse navigator consult.   -Surgical plan: Plan for b/l breast skin sparing mastectomy with R SLN biopsy. Plans for approximately 6 weeks postoperatively from excisional biopsy.  -Plastic surgery referral. Appointment with Dr. Baer next week.      (2) Medical Oncology:  -Will refer postoperatively for evaluation for endocrine therapy.     (3) Radiation Oncology:  -Will refer postoperatively for evaluation for radiation therapy as clinically indicated.      (4) A history of right breast atypical ductal hyperplasia.  -S/p right breast wire localized excisional biopsy 2/2023 with upstage to DCIS.     Referring Provider: No ref. provider found     Chief complaint: abnormal breast imaging     HPI: Ms. Dolores Dejesus is seen at the request of No ref. provider found. The patient is a 47 year old woman being seen for a new diagnosis of right breast atypical ductal hyperplasia.       This was initially detected as an imaging abnormality on routine screening. Her work-up is detailed in the breast history section below. She has had regular annual mammogram. She denies any prior history of abnormal mammograms or biopsies. She denies any breast lumps, pain, skin changes, or nipple discharge. She has had some right breast pain over the last few months.     She has a family history of breast cancer in her sister at age 35. She denies any family history of ovarian cancer.      2/28/2023 she presents today for follow-up.  She is doing  well since surgery.  Her pain is controlled.  She is getting back to her daily activities.  She has an appointment with Dr. Baer next week to discuss mastectomies with reconstruction.     TIMELINE OF WORKUP:  10/12/2022 bilateral diagnostic mammogram with ultrasound:  Finding 1: There is no radiographic abnormality in the region of the pain in the right breast at 9:00.  There are no suspicious masses, calcifications, or areas of architectural distortion.  Finding 2: There is no radiographic abnormality in the region of the pain in the left breast at 6:00.  No solid or suspicious masses are seen.  Remainder of report pending.     11/10/2022 right breast ultrasound-guided biopsy:  The mass in the right breast at 1030 was located.  5 cores were obtained.  A mini cork tissue marker was placed.  Clip was in satisfactory position.  Pathology returned as atypical ductal hyperplasia which is concordant.     11/10/2022 pathology:  Right breast, 1030:  Atypical ductal hyperplasia     12/27/2022 Bilateral Breast MRI   IMPRESSION:  High risk screening breast MR demonstrating no MR findings of malignancy.   BIRADS: DIAGNOSTIC CATEGORY 1--NEGATIVE.       2/9/2023 Right breast wire localized excisional biopsy  Final Diagnosis   1. Right Breast, Oriented Needle Localization Lumpectomy (11 grams): LOW GRADE DUCTAL CARCINOMA IN SITU (DCIS).  A. Tumor site: 10:30 o'clock.               B. Extent of DCIS: DCIS is multifocal, contiguously spanning approximately 25 mm from medial to lateral.               C. Architecture patterns: Solid and micropapillary.               D. Nuclear grade: Low.               E. Margins: DCIS focally extends to the anterior (3.0 mm focus) and inferior (0.4 mm) margins, is present 1.5 mm       from the superior margin (2.0 mm focus), 2.3 mm from the posterior margin and 5 mm from medial and lateral       margins of excision.  F. No lymph nodes submitted.  G. Hormone receptor status: ER moderately  positive, WA strongly positive, Ki67 ~10% (see biomarker template).   H. Pathologic stage: pTis, NX.       MEDICAL HISTORY:   Gynecologic History:   . P:6 AB:1  Age at first childbirth: 21  Lactation/How long: Yes  Age at menarche: 14  Age at menopause: Unsure  Total years of oral contraceptive use: 21 years previously  Total years of hormone replacement therapy: 5 years, currently still on     Past Medical History:   • GERMAN syndrome      Past Surgical History:    • Cholecystectomy   EGD and colonoscopy     Family History:    • As above     Social History:   • Denies tobacco use  • Occasional alcohol use     Allergies:        Allergies   Allergen Reactions   • Carbamates Hives   • Carisoprodol Hives         Medications:      Current Outpatient Medications:   •  ALBUTEROL SULFATE HFA IN, Inhale Every 4 (Four) Hours As Needed., Disp: , Rfl:   •  bisoprolol (ZEBeta) 5 MG tablet, Take 0.5 tablets by mouth every night at bedtime., Disp: 30 tablet, Rfl: 6  •  butalbital-acetaminophen-caffeine (FIORICET, ESGIC) -40 MG per tablet, Take 1 tablet by mouth Every 4 (Four) Hours As Needed for Headache., Disp: , Rfl:   •  Cyanocobalamin (Vitamin B 12) 500 MCG tablet, Take 1 tablet by mouth Daily., Disp: , Rfl:   •  cyclobenzaprine (FLEXERIL) 10 MG tablet, Take 2 tablets by mouth Daily. 2 tablets daily, Disp: , Rfl:   •  diazePAM (VALIUM) 5 MG tablet, Take 1 tablet by mouth 3 (Three) Times a Day., Disp: , Rfl:   •  diclofenac (VOLTAREN) 50 MG EC tablet, Take 1 tablet by mouth 2 (Two) Times a Day., Disp: , Rfl:   •  Diclofenac Sodium (VOLTAREN) 1 % gel gel, Apply 4 g topically to the appropriate area as directed Daily., Disp: , Rfl:   •  dilTIAZem SR (CARDIZEM SR) 60 MG 12 hr capsule, Take 1 capsule by mouth Daily As Needed (for increased heart rate). Once daily with extra tablet if needed for increased heart rate, Disp: 90 capsule, Rfl: 1  •  Droxidopa 100 MG capsule, Take 1 capsule by mouth 3 (Three) Times a Day., Disp:  90 capsule, Rfl: 6  •  Erenumab-aooe (AIMOVIG, 140 MG DOSE, SC), Inject 1 dose under the skin into the appropriate area as directed Every 30 (Thirty) Days., Disp: , Rfl:   •  fluticasone (FLONASE) 50 MCG/ACT nasal spray, 2 sprays into the nostril(s) as directed by provider Every Night., Disp: , Rfl:   •  linaclotide (LINZESS) 290 MCG capsule capsule, Take 1 capsule by mouth Every Morning Before Breakfast., Disp: , Rfl:   •  Loratadine 10 MG capsule, Take 1 capsule by mouth Daily., Disp: , Rfl:   •  Magnesium 125 MG capsule, Take  by mouth. HOLD PER MD INSTR, Disp: , Rfl:   •  methocarbamol (ROBAXIN) 750 MG tablet, Take 1 tablet by mouth Every 4 (Four) Hours., Disp: , Rfl:   •  metoclopramide (REGLAN) 10 MG tablet, Take 1 tablet by mouth 2 (Two) Times a Day Before Meals., Disp: , Rfl:   •  NON FORMULARY, Epidural-Bupivicane-lidocaine injections every 3 months, Disp: , Rfl:   •  omeprazole (priLOSEC) 40 MG capsule, Take 1 capsule by mouth Daily., Disp: , Rfl:   •  OnabotulinumtoxinA (BOTOX IJ), Inject  as directed Every 3 (Three) Months., Disp: , Rfl:   •  ondansetron (ZOFRAN) 4 MG tablet, Take 1 tablet by mouth Every 8 (Eight) Hours As Needed., Disp: , Rfl:   •  sertraline (ZOLOFT) 100 MG tablet, Take 1 tablet by mouth 2 (Two) Times a Day., Disp: , Rfl:   •  Spiriva Respimat 1.25 MCG/ACT aerosol solution inhaler, Inhale 2 puffs Daily., Disp: , Rfl:   •  topiramate (TOPAMAX) 50 MG tablet, Take 1.5 tablets by mouth 2 (Two) Times a Day. 1 1/2 tablets twice a day, Disp: , Rfl:   •  traZODone (DESYREL) 50 MG tablet, Take 1 tablet by mouth Every Night., Disp: , Rfl:      Labs:    • Labs from 2/2/2023 reviewed     ROS:   Influenza-like illness: no fever, no  cough, no  sore throat, no  body aches, no loss of sense of taste or smell, no known exposure to person with Covid-19.  Constitutional: Negative for fevers or chills  HENT: Negative for hearing loss or runny nose  Eyes: Negative for vision changes or scleral  icterus  Respiratory: Negative for cough or shortness of breath  Cardiovascular: Negative for chest pain or heart palpitations  Gastrointestinal: Negative for abdominal pain, nausea, vomiting, constipation, melena, or hematochezia  Genitourinary: Negative for hematuria or dysuria  Musculoskeletal: Negative for joint swelling or gait instability  Neurologic: Negative for tremors or seizures  Psychiatric: Negative for suicidal ideations or depression  All other systems reviewed and negative     PHYSICAL EXAM:   • ECO - Asymptomatic  • Constitutional: Well-developed well-nourished, no acute distress  • Eyes: Conjunctiva normal, sclera nonicteric  • ENMT: Hearing grossly normal, oral mucosa moist  • Neck: Supple, no palpable mass, trachea midline  • Respiratory: Clear to auscultation, normal inspiratory effort  • Cardiovascular: Regular rate, no murmur, no peripheral edema, no jugular venous distention  • Breast: symmetric  ? Right: No visible abnormalities on inspection while seated, with arms raised or hands on hips. No masses, skin changes, or nipple abnormalities. Right breast lateral periareolar incision clean and dry, no hematoma or seroma, healing well.   ? Left: No visible abnormalities on inspection while seated, with arms raised or hands on hips. No masses, skin changes, or nipple abnormalities.  ? No clinical chest wall involvement.  • Gastrointestinal: Soft, nontender  • Lymphatics (palpable nodes): No cervical, supraclavicular or axillary lymphadenopathy  • Skin:  Warm, dry, no rash on visualized skin surfaces  • Musculoskeletal: Symmetric strength, normal gait  • Psychiatric: Alert and oriented ×3, normal affect      THERESA HORTA M.D.  General and Endoscopic Surgery  [facility]     [address], [address]  [place], [place], [facility]  P: [phone]  F: [phone]

## 2023-03-29 NOTE — ANESTHESIA PROCEDURE NOTES
Airway  Urgency: elective    Date/Time: 3/29/2023 8:22 AM  Airway not difficult    General Information and Staff    Patient location during procedure: OR  Anesthesiologist: Antony Bennett MD    Indications and Patient Condition  Indications for airway management: airway protection    Preoxygenated: yes  MILS maintained throughout  Mask difficulty assessment: 1 - vent by mask    Final Airway Details  Final airway type: endotracheal airway      Successful airway: ETT  Cuffed: yes   Successful intubation technique: direct laryngoscopy  Facilitating devices/methods: Bougie and anterior pressure/BURP  Endotracheal tube insertion site: oral  Blade: Elvira  Blade size: 3  ETT size (mm): 7.0  Cormack-Lehane Classification: grade IIa - partial view of glottis  Placement verified by: chest auscultation and capnometry   Measured from: lips  Number of attempts at approach: 2  Assessment: lips, teeth, and gum same as pre-op and atraumatic intubation    Additional Comments  Eschmann needed         Adult

## 2023-03-29 NOTE — ANESTHESIA PREPROCEDURE EVALUATION
Anesthesia Evaluation     Patient summary reviewed and Nursing notes reviewed   history of anesthetic complications: PONV  NPO Solid Status: > 8 hours  NPO Liquid Status: > 6 hours           Airway   Mallampati: II  TM distance: >3 FB  Neck ROM: full  No difficulty expected  Dental - normal exam     Pulmonary - normal exam   (+) asthma,  Cardiovascular - normal exam    ECG reviewed  Rhythm: regular  Rate: normal    (+) hypertension 2 medications or greater, CAD, dysrhythmias Tachycardia,     ROS comment: Hx POTS/hx PSVT, s/p ablation 2/21/normal stress test in 2017    Neuro/Psych  (+) headaches, dizziness/light headedness, psychiatric history Anxiety and Depression,      ROS Comment: Migraines  GI/Hepatic/Renal/Endo    (+) obesity,  GERD, GI bleeding , renal disease stones,     Musculoskeletal         ROS comment: Lumbar DDD  Abdominal   (+) obese,    Substance History - negative use     OB/GYN negative ob/gyn ROS         Other   arthritis, blood dyscrasia,   history of cancer      Other Comment: Right breast CA/Monoclonal (M) protein disease                Anesthesia Plan    ASA 3     general     (I have reviewed the patient's history with the patient and the chart, including all pertinent laboratory results and imaging. I have explained the risks of anesthesia including but not limited to dental damage, corneal abrasion, nerve injury, MI, stroke, and death. Questions asked and answered. Anesthetic plan discussed with patient and team as indicated. Patient expressed understanding of the above.  )  intravenous induction     Anesthetic plan, risks, benefits, and alternatives have been provided, discussed and informed consent has been obtained with: patient and spouse/significant other.        CODE STATUS:

## 2023-03-29 NOTE — ANESTHESIA POSTPROCEDURE EVALUATION
Patient: Dolores Dejesus    Procedure Summary     Date: 03/29/23 Room / Location: [facility] OR  / [facility] MAIN OR    Anesthesia Start: 0809 Anesthesia Stop: 1143    Procedures:       bilateral skin sparing mastectomy, right sentinel lymph node biopsy (Right: Breast)      BILATERAL IMMEDIATE BREAST  RECONSTRUCTION  WITH TISSUE EXPANDERS AND BIOLOGIC (Bilateral: Breast) Diagnosis:       Ductal carcinoma in situ (DCIS) of right breast      (Ductal carcinoma in situ (DCIS) of right breast [D05.11])    Surgeons: Dasia Marroquin MD; Georgia Baer MD Provider: Antony Bennett MD    Anesthesia Type: general ASA Status: 3          Anesthesia Type: general    Vitals  Vitals Value Taken Time   /76 03/29/23 1333   Temp 36.6 °C (97.8 °F) 03/29/23 1140   Pulse 89 03/29/23 1334   Resp 16 03/29/23 1315   SpO2 98 % 03/29/23 1334   Vitals shown include unvalidated device data.        Post Anesthesia Care and Evaluation    Patient location during evaluation: bedside  Patient participation: complete - patient participated  Level of consciousness: awake and alert  Pain management: adequate    Airway patency: patent  Anesthetic complications: No anesthetic complications  PONV Status: controlled  Cardiovascular status: blood pressure returned to baseline and acceptable  Respiratory status: acceptable  Hydration status: acceptable

## 2023-03-30 VITALS
WEIGHT: 187.39 LBS | RESPIRATION RATE: 18 BRPM | OXYGEN SATURATION: 95 % | BODY MASS INDEX: 31.99 KG/M2 | DIASTOLIC BLOOD PRESSURE: 69 MMHG | SYSTOLIC BLOOD PRESSURE: 105 MMHG | HEIGHT: 64 IN | TEMPERATURE: 97.6 F | HEART RATE: 77 BPM

## 2023-03-30 LAB
LAB AP CASE REPORT: NORMAL
PATH REPORT.FINAL DX SPEC: NORMAL
PATH REPORT.GROSS SPEC: NORMAL

## 2023-03-30 PROCEDURE — G0378 HOSPITAL OBSERVATION PER HR: HCPCS

## 2023-03-30 PROCEDURE — 94761 N-INVAS EAR/PLS OXIMETRY MLT: CPT

## 2023-03-30 PROCEDURE — 94799 UNLISTED PULMONARY SVC/PX: CPT

## 2023-03-30 PROCEDURE — 94760 N-INVAS EAR/PLS OXIMETRY 1: CPT

## 2023-03-30 PROCEDURE — 63710000001 PROMETHAZINE PER 25 MG: Performed by: STUDENT IN AN ORGANIZED HEALTH CARE EDUCATION/TRAINING PROGRAM

## 2023-03-30 PROCEDURE — 94664 DEMO&/EVAL PT USE INHALER: CPT

## 2023-03-30 RX ORDER — FLUCONAZOLE 100 MG/1
100 TABLET ORAL DAILY
Qty: 3 TABLET | Refills: 0 | Status: SHIPPED | OUTPATIENT
Start: 2023-03-30 | End: 2023-04-02

## 2023-03-30 RX ADMIN — HYDROCODONE BITARTRATE AND ACETAMINOPHEN 1 TABLET: 5; 325 TABLET ORAL at 03:32

## 2023-03-30 RX ADMIN — FLUDROCORTISONE ACETATE 0.2 MG: 0.1 TABLET ORAL at 09:09

## 2023-03-30 RX ADMIN — TIOTROPIUM BROMIDE INHALATION SPRAY 2 PUFF: 3.12 SPRAY, METERED RESPIRATORY (INHALATION) at 07:39

## 2023-03-30 RX ADMIN — HYDROCODONE BITARTRATE AND ACETAMINOPHEN 1 TABLET: 5; 325 TABLET ORAL at 09:08

## 2023-03-30 RX ADMIN — PROMETHAZINE HYDROCHLORIDE 25 MG: 25 TABLET ORAL at 09:08

## 2023-03-30 RX ADMIN — Medication 10 ML: at 09:12

## 2023-03-30 RX ADMIN — TOPIRAMATE 75 MG: 25 TABLET, FILM COATED ORAL at 09:10

## 2023-03-30 RX ADMIN — PANTOPRAZOLE SODIUM 40 MG: 40 TABLET, DELAYED RELEASE ORAL at 06:49

## 2023-03-30 RX ADMIN — DIAZEPAM 2 MG: 2 TABLET ORAL at 06:51

## 2023-03-30 RX ADMIN — SERTRALINE 100 MG: 100 TABLET, FILM COATED ORAL at 09:10

## 2023-03-30 RX ADMIN — BISOPROLOL FUMARATE 2.5 MG: 5 TABLET, FILM COATED ORAL at 09:08

## 2023-03-30 NOTE — PROGRESS NOTES
Case Management Discharge Note      Final Note: Discharged home. Noemí Anand RN         Transportation Services  Private: Car    Final Discharge Disposition Code: 01 - home or self-care

## 2023-03-30 NOTE — PLAN OF CARE
Goal Outcome Evaluation:  Plan of Care Reviewed With: patient        Progress: improving  Outcome Evaluation: VSS. LOrtab given for pain. thang chest dressing CDI.  KERRY x 4 with small output.  voiding freely.  Ambulated in the lanza.

## 2023-03-30 NOTE — PROGRESS NOTES
POD #1 B mastectomies with immediate prepectoral breast reconstruction    Long night - per patient  Taking po    98.2  86  16  95/63  95%  228 cc out drains     A O x3 PERRL EOMI  Breathing nonlabored  Incisions c/d/i, no fluid collections  Serosanguinous fluid in drains     A/P POD #1  Doing well, plan D/C  Has meds, requests diflucan from pharmacy here  Drain teaching,   Cont ACE or gentle compression    Fu with my nurse next week

## 2023-03-30 NOTE — DISCHARGE SUMMARY
DATE OF ADMISSION:  3/29/2023  DATE OF DISCHARGE:  3/30 2023    ATTENDING:        Dasia Marroquin M.D.       GENERAL SURGERY    CONSULTS:    None    PRINCIPAL DIAGNOSIS:     Right breast DCIS    DISCHARGE DIAGNOSES:     Right breast DCIS    HOSPITAL PROCEDURES:     Bilateral breast skin sparing mastectomy with right sentinel lymph node biopsy    HOSPITAL COURSE:   The patient was admitted after the above listed elective procedure. She had an uneventful postoperative course. She was discharged on POD1 with no issues. She was tolerating a diet, her pain was controlled with PO pain medication, and she was ambulating without assistance.    ACTIVITY:  Okay to shower.  Ambulate and climb stairs as tolerated.  No lifting over 10 lbs for 2 weeks.  Okay to drive if not taking pain medications.    DIET:  Regular diet    FOLLOW UP:  With Dr Marroquin in 3-4 weeks. Instructed to call [phone] for an appointment.

## 2023-03-30 NOTE — PLAN OF CARE
Goal Outcome Evaluation:  Plan of Care Reviewed With: patient, family        Progress: improving  Outcome Evaluation: Norco with adeq pain control. ellen diet. voiding. drain care taught and demonstrated. going home.

## 2023-03-31 ENCOUNTER — TELEPHONE (OUTPATIENT)
Dept: SURGERY | Facility: CLINIC | Age: 48
End: 2023-03-31
Payer: OTHER GOVERNMENT

## 2023-03-31 ENCOUNTER — TELEPHONE (OUTPATIENT)
Dept: OTHER | Facility: HOSPITAL | Age: 48
End: 2023-03-31
Payer: OTHER GOVERNMENT

## 2023-03-31 DIAGNOSIS — D05.10 DUCTAL CARCINOMA IN SITU (DCIS) OF BREAST, UNSPECIFIED LATERALITY: Primary | ICD-10-CM

## 2023-03-31 NOTE — TELEPHONE ENCOUNTER
Called Dolores Dejesus regarding recent surgical pathology.    Left breast: ADH    Right breast DCIS (40mm), grade I-II, margins negative, ER+/WV+. 0/2 lymph nodes.   [id number]    Referral placed for medical oncology.  Follow up in two weeks for wound check and further cancer surveillance planning.     Dasia Marroquin MD

## 2023-03-31 NOTE — TELEPHONE ENCOUNTER
Oncology Social Work    Diagnosis: Ductal carcinoma in situ (DCIS) of breast, unspecified laterality       Interventions:     Financial Needs: FMLA/st disability/lt disability (Discussed FMLA paperwork for her )  Emotional Needs: emotional suppport/coping strategies    Comments:  [facility] telephoned the patient and actively listened to her while providing emotional support.  The patient discussed that she is sore from her surgery but states that everything went well. The patient states that she will be staying at her daughters home in [place] until her follow up appointment.  Her daughter's address is [address] [place] [address] [place] [address].  The patient was agreeable to information to be mailed to her at that address.  She was informed that [facility] would mail her information on grief for her to read (loss of breasts) and [facility] contact information.  SOLO Ramirez

## 2023-04-07 NOTE — OP NOTE
OPERATIVE REPORT     DATE:  3/29/2023     SURGEON: Dasia Marroquin MD      ASSISTANT: Kelly Barry PA-C, who was present for necessary suctioning, retracting, suturing throughout the procedure      OPERATION PERFORMED:  Bilateral breast skin sparing mastectomy with right sentinel lymph node biopsy      PREOPERATIVE DIAGNOSIS:  DCIS of the right breast      POSTOPERATIVE DIAGNOSIS: Same     ANESTHESIA:  General     SPECIMEN:   Left breast (stitch at 12:00)  Right breast (stitch at 12:00)    Right axillary sentinel lymph node #1   Right axillary sentinel lymph node #2     DRAINS: None     BLOOD LOSS: Minimal     INDICATION FOR OPERATION: Dolores Dejesus is a 47 y.o. lady who was recently underwent a right breast excisional biopsy that upstaged to DCIS.  Due to her family history and age, she elected to proceed with bilateral skin sparing mastectomies with right sentinel lymph node biopsy. All risks (including bleeding, infection, damage to surrounding structures, need for further surgery, positive margins), benefits and alternatives were explained to the patient who agreed and wished to proceed. Informed consent was signed.      OPERATIVE COURSE: The patient was taken to the operating room, transferred onto the operating room table, and underwent anesthesia without incident. 5 cc of blue dye was injected into the dermal layer of the right nipple areolar complex. The patient was prepped and draped in sterile fashion. A time out was performed and preoperative antibiotics were given.     We began on the left side. An elliptical incision was drawn around the nipple areolar complex as previously marked by Dr. Baer. Half percent Marcaine was injected into the skin and subcutaneous tissues. A scalpel was used to transect the skin and dermal layer. Subcutaneous flaps were created approximately 1cm in thickness circumferentially. These were extended to the clavicle superiorly, the sternum medially, the inframammary fold  inferiorly, and the serratus laterally. Once this was circumferentially dissected free with Bovie electrocautery, the breast was removed from the chest wall at the level of the pectoralis fascia. It was marked as listed above and passed off for fresh permanent specimen. The area was irrigated and appeared hemostatic. Bovie electrocautery was used for any small muscle bleeding. The remainder of the half percent Marcaine was injected into the intercostal nerve bundles and the subcutaneous tissue.    We then performed the right mastectomy. An elliptical incision was drawn around the nipple areolar complex as previously marked by Dr. Baer. Half percent Marcaine was injected into the skin and subcutaneous tissues. A scalpel was used to transect the skin and dermal layer. Subcutaneous flaps were created approximately 1cm in thickness circumferentially. These were extended to the clavicle superiorly, the sternum medially, the inframammary fold inferiorly, and the serratus laterally. Once this was circumferentially dissected free with Bovie electrocautery, the breast was removed from the chest wall at the level of the pectoralis fascia. It was marked as listed above and passed off for fresh permanent specimen. The area was irrigated and appeared hemostatic. Bovie electrocautery was used for any small muscle bleeding. The remainder of the half percent Marcaine was injected into the intercostal nerve bundles and the subcutaneous tissue.    We then performed a right sentinel lymph node biopsy. Bovie electrocautery was used to dissect down through the skin and subcutaneous tissues and through the clavipectoral fascia into the axilla. Two sentinel lymph nodes were identified. These were removed with Bovie electrocautery and clips across all major lymphovascular structures. Once this was done, there were no hot, blue, or palpable lymph nodes remaining. The area was irrigated and appeared hemostatic.    Dr. Baer then  performed the reconstruction. The patient tolerated the procedure well. All needle and lap counts were correct at the end of the case. The patient was then awoken from anesthesia and taken to recovery for further monitoring.     [place] Node Biopsy for Breast Cancer - Right  Operation performed with curative intent. Yes   Tracer(s) used to identify sentinel nodes in the upfront surgery (non-neoadjuvant) setting (select all that apply). Dye and Radioactive tracer   Tracer(s) used to identify sentinel nodes in the neoadjuvant setting (select all that apply). N/A   All nodes (colored or non-colored) present at the end of a dye-filled lymphatic channel were removed. Yes   All significantly radioactive nodes were removed. Yes   All palpably suspicious nodes were removed. Yes   Biopsy-proven positive nodes marked with clips prior to chemotherapy were identified and removed. N/A      Dasia Marroquin MD   General and Endoscopic Surgery  [facility]     [address], [address]  [place], [place], [address]  P: [phone]  F: [phone]

## 2023-04-11 ENCOUNTER — OFFICE VISIT (OUTPATIENT)
Dept: SURGERY | Facility: CLINIC | Age: 48
End: 2023-04-11
Payer: OTHER GOVERNMENT

## 2023-04-11 DIAGNOSIS — D05.11 DUCTAL CARCINOMA IN SITU (DCIS) OF RIGHT BREAST: Primary | ICD-10-CM

## 2023-04-11 PROCEDURE — 99024 POSTOP FOLLOW-UP VISIT: CPT | Performed by: STUDENT IN AN ORGANIZED HEALTH CARE EDUCATION/TRAINING PROGRAM

## 2023-04-11 NOTE — PROGRESS NOTES
General Surgery Breast Cancer History and Physical Exam     Summary:    Dolores Dejesus is a 47 y.o. lady who presents with a history of right breast ductal carcinoma in situ: Grade I,  ER+/ND+, Ki-67 10%; [id number], Stage 0.      A multidisciplinary plan has been formulated for the patient:    (1) Breast Surgical Oncology:  -s/p right breast excisional biopsy 2/2023 with upstage to DCIS.   -S/p bilateral skin sparing mastectomy with right sentinel lymph node biopsy and reconstruction by Dr. Baer 3/2023.   -Lymphedema clinic referral.  -Follow-up in 1 year with Heather Sumner PA-C    (2) Medical Oncology:  -Appointment with Dr. Anderson 4/18/2023.     (3) A history of right breast atypical ductal hyperplasia.  -S/p right breast wire localized excisional biopsy 2/2023 with upstage to DCIS.    Referring Provider: No ref. provider found    Chief complaint: abnormal breast imaging    HPI: Ms. Dolores Dejesus is seen at the request of No ref. provider found. The patient is a 47 year old woman being seen for a new diagnosis of right breast atypical ductal hyperplasia.      This was initially detected as an imaging abnormality on routine screening. Her work-up is detailed in the breast history section below. She has had regular annual mammogram. She denies any prior history of abnormal mammograms or biopsies. She denies any breast lumps, pain, skin changes, or nipple discharge. She has had some right breast pain over the last few months.    She has a family history of breast cancer in her sister at age 35. She denies any family history of ovarian cancer.     2/28/2023 she presents today for follow-up.  She is doing well since surgery.  Her pain is controlled.  She is getting back to her daily activities.  She has an appointment with Dr. Baer next week to discuss mastectomies with reconstruction.    4/11/2023 She presents today for follow up. She still has some soreness and pain. This is not getting worse. She is slowly  getting back to her daily activities. Her drain output is decreasing.     TIMELINE OF WORKUP:  10/12/2022 bilateral diagnostic mammogram with ultrasound:  Finding 1: There is no radiographic abnormality in the region of the pain in the right breast at 9:00.  There are no suspicious masses, calcifications, or areas of architectural distortion.  Finding 2: There is no radiographic abnormality in the region of the pain in the left breast at 6:00.  No solid or suspicious masses are seen.  Remainder of report pending.    11/10/2022 right breast ultrasound-guided biopsy:  The mass in the right breast at 1030 was located.  5 cores were obtained.  A mini cork tissue marker was placed.  Clip was in satisfactory position.  Pathology returned as atypical ductal hyperplasia which is concordant.    11/10/2022 pathology:  Right breast, 1030:  Atypical ductal hyperplasia    12/27/2022 Bilateral Breast MRI   IMPRESSION:  High risk screening breast MR demonstrating no MR findings of malignancy.   BIRADS: DIAGNOSTIC CATEGORY 1--NEGATIVE.      2/9/2023 Right breast wire localized excisional biopsy  Final Diagnosis   1. Right Breast, Oriented Needle Localization Lumpectomy (11 grams): LOW GRADE DUCTAL CARCINOMA IN SITU (DCIS).  A. Tumor site: 10:30 o'clock.               B. Extent of DCIS: DCIS is multifocal, contiguously spanning approximately 25 mm from medial to lateral.               C. Architecture patterns: Solid and micropapillary.               D. Nuclear grade: Low.               E. Margins: DCIS focally extends to the anterior (3.0 mm focus) and inferior (0.4 mm) margins, is present 1.5 mm       from the superior margin (2.0 mm focus), 2.3 mm from the posterior margin and 5 mm from medial and lateral       margins of excision.  F. No lymph nodes submitted.  G. Hormone receptor status: ER moderately positive, CO strongly positive, Ki67 ~10% (see biomarker template).   H. Pathologic stage: pTis, NX.      3/29/2023 Bilateral  breast skin sparing mastectomy with right sentinel lymph node biopsy   Final Diagnosis   1. Breast, Left, Mastectomy: Breast tissue with  A. Atypical ductal hyperplasia.  B. [place] ductal hyperplasia of the usual type.  C. Simple cyst.  D. Secretory change.  E. Duct ectasia.  F. Clustered cysts.     2. Lymph Node, Right [place] Node #1, Excision (2 mm slices):               A. Single benign lymph node.     3. Breast, Right, Mastectomy: Ductal carcinoma in situ.               A. The ductal carcinoma in situ is of low to intermediate nuclear grade with solid and cribriform architecture and        focal necrosis.   B. The ductal carcinoma in situ measures up to 40 mm  (4 consecutive 1 cm slices).  C. The ductal carcinoma in situ comes within 2.5 mm of the posterior margin, 10 mm of the medial margin,       16 mm of the anterior margin, 75 mm of the inferior margin, 85 mm of the superior and 130 mm of the lateral         margin.  D. Microcalcifications are not associated with the ductal carcinoma in situ.  E. Calcifications are present with non-neoplastic tissue.  F. Foci of atypical ductal hyperplasia  G. Lumpectomy site changes are present with the ductal carcinoma in situ surrounding the areas of the        lumpectomy site.     Comment: The case has been previously staged [id number] that will not be repeated as only residual ductal carcinoma in situ is identified. It was originally staged as Pathologic stage pTis, NX. Given the additional lymph nodes it is pTis, pN0. Hormone receptors were performed on prior tissue and documented in case [id number] which are ER moderate positivity, WA strongly positive, Ki-67 10%. The ER was 81-90% positive. The WA was % positive.     4. Lymph Node, Right [place] Node #2, Excision (2 mm slices):               A. Single benign lymph node.     MEDICAL HISTORY:   Gynecologic History:   . P:6 AB:1  Age at first childbirth: 21  Lactation/How long: Yes  Age at menarche: 14  Age  at menopause: Unsure  Total years of oral contraceptive use: 21 years previously  Total years of hormone replacement therapy: 5 years, currently still on    Past Medical History:   • GERMAN syndrome     Past Surgical History:    • Cholecystectomy   EGD and colonoscopy    Family History:    • As above    Social History:   • Denies tobacco use  • Occasional alcohol use    Allergies:   Allergies   Allergen Reactions   • Carbamates Hives   • Carisoprodol Hives   • Triptans Hives     Medications:     Current Outpatient Medications:   •  ALBUTEROL SULFATE HFA IN, Inhale Every 4 (Four) Hours As Needed., Disp: , Rfl:   •  bisoprolol (ZEBeta) 5 MG tablet, Take 0.5 tablets by mouth every night at bedtime., Disp: 30 tablet, Rfl: 6  •  butalbital-acetaminophen-caffeine (FIORICET, ESGIC) -40 MG per tablet, Take 1 tablet by mouth Every 4 (Four) Hours As Needed for Headache., Disp: , Rfl:   •  cephalexin (KEFLEX) 500 MG capsule, TAKE 1 CAPSULE BY MOUTH FOUR TIMES DAILY AS DIRECTED, Disp: , Rfl:   •  Chlorhexidine Gluconate Cloth 2 % pads, Apply  topically Take As Directed. PRIOR TO SURGERY, Disp: , Rfl:   •  Cyanocobalamin (Vitamin B 12) 500 MCG tablet, Take 1 tablet by mouth Daily. HELD FOR SURGERY, Disp: , Rfl:   •  cyclobenzaprine (FLEXERIL) 10 MG tablet, Take 2 tablets by mouth Daily. 2 tablets daily, Disp: , Rfl:   •  diazePAM (VALIUM) 2 MG tablet, Take 1 tablet by mouth 3 (Three) Times a Day., Disp: , Rfl:   •  diclofenac (VOLTAREN) 50 MG EC tablet, Take 1 tablet by mouth 2 (Two) Times a Day. HOLD PER MD INSTRUCTIONS PRIOR TO SURGERY, Disp: , Rfl:   •  Diclofenac Sodium (VOLTAREN) 1 % gel gel, Apply 4 g topically to the appropriate area as directed Daily., Disp: , Rfl:   •  dilTIAZem SR (CARDIZEM SR) 60 MG 12 hr capsule, Take 1 capsule by mouth Daily As Needed (for increased heart rate). Once daily with extra tablet if needed for increased heart rate, Disp: 90 capsule, Rfl: 3  •  Erenumab-aooe (AIMOVIG, 140 MG DOSE,  SC), Inject 1 dose under the skin into the appropriate area as directed Every 30 (Thirty) Days., Disp: , Rfl:   •  fludrocortisone 0.1 MG tablet, Take 2 tablets by mouth Daily., Disp: 60 tablet, Rfl: 6  •  fluticasone (FLONASE) 50 MCG/ACT nasal spray, 2 sprays into the nostril(s) as directed by provider Every Night., Disp: , Rfl:   •  HYDROcodone-acetaminophen (NORCO) 5-325 MG per tablet, Take  by mouth See Admin Instructions. Take 1 tablet by mouth every 4-6 hours as needed for pain, Disp: , Rfl:   •  linaclotide (LINZESS) 290 MCG capsule capsule, Take 1 capsule by mouth Every Morning Before Breakfast., Disp: , Rfl:   •  Loratadine 10 MG capsule, Take 1 capsule by mouth Daily., Disp: , Rfl:   •  Magnesium 125 MG capsule, Take  by mouth. HELD FOR SURGERY, Disp: , Rfl:   •  methocarbamol (ROBAXIN) 750 MG tablet, Take 1 tablet by mouth Every 4 (Four) Hours., Disp: , Rfl:   •  metoclopramide (REGLAN) 10 MG tablet, Take 1 tablet by mouth 2 (Two) Times a Day Before Meals., Disp: , Rfl:   •  NON FORMULARY, Epidural-Bupivicane-lidocaine injections every 3 months, Disp: , Rfl:   •  omeprazole (priLOSEC) 40 MG capsule, Take 1 capsule by mouth Daily., Disp: , Rfl:   •  OnabotulinumtoxinA (BOTOX IJ), Inject  as directed Every 3 (Three) Months., Disp: , Rfl:   •  ondansetron (ZOFRAN) 4 MG tablet, Take 1 tablet by mouth Every 8 (Eight) Hours As Needed., Disp: , Rfl:   •  promethazine (PHENERGAN) 25 MG tablet, Take 1 tablet by mouth Every 12 (Twelve) Hours., Disp: , Rfl:   •  sertraline (ZOLOFT) 100 MG tablet, Take 1 tablet by mouth 2 (Two) Times a Day., Disp: , Rfl:   •  Spiriva Respimat 1.25 MCG/ACT aerosol solution inhaler, Inhale 2 puffs Daily., Disp: , Rfl:   •  topiramate (TOPAMAX) 50 MG tablet, Take 1.5 tablets by mouth 2 (Two) Times a Day. 1 1/2 tablets twice a day, Disp: , Rfl:   •  traZODone (DESYREL) 50 MG tablet, Take 1 tablet by mouth Every Night., Disp: , Rfl:     Labs:    • Labs from 3/22/2023 reviewed    ROS:    Influenza-like illness: no fever, no  cough, no  sore throat, no  body aches, no loss of sense of taste or smell, no known exposure to person with Covid-19.  Constitutional: Negative for fevers or chills  HENT: Negative for hearing loss or runny nose  Eyes: Negative for vision changes or scleral icterus  Respiratory: Negative for cough or shortness of breath  Cardiovascular: Negative for chest pain or heart palpitations  Gastrointestinal: Negative for abdominal pain, nausea, vomiting, constipation, melena, or hematochezia  Genitourinary: Negative for hematuria or dysuria  Musculoskeletal: Negative for joint swelling or gait instability  Neurologic: Negative for tremors or seizures  Psychiatric: Negative for suicidal ideations or depression  All other systems reviewed and negative    PHYSICAL EXAM:   • ECO - Asymptomatic  • Constitutional: Well-developed well-nourished, no acute distress  • Eyes: Conjunctiva normal, sclera nonicteric  • ENMT: Hearing grossly normal, oral mucosa moist  • Neck: Supple, no palpable mass, trachea midline  • Respiratory: Clear to auscultation, normal inspiratory effort  • Cardiovascular: Regular rate, no murmur, no peripheral edema, no jugular venous distention  • Breast: symmetric  o Bilateral mastectomy incisions clean and dry, no erythema or drainage, no hematoma or seroma  o No clinical chest wall involvement.  • Gastrointestinal: Soft, nontender  • Lymphatics (palpable nodes): No cervical, supraclavicular or axillary lymphadenopathy  • Skin:  Warm, dry, no rash on visualized skin surfaces  • Musculoskeletal: Symmetric strength, normal gait  • Psychiatric: Alert and oriented ×3, normal affect     THERESA HORTA M.D.  General and Endoscopic Surgery  [facility]    [address] [place] [address], [address]  [place], [place], [address]  P: [phone]  F: [phone]

## 2023-04-13 ENCOUNTER — PATIENT OUTREACH (OUTPATIENT)
Dept: OTHER | Facility: HOSPITAL | Age: 48
End: 2023-04-13
Payer: OTHER GOVERNMENT

## 2023-04-13 NOTE — PROGRESS NOTES
Called Ms. Dejesus to see how she was doing. She stated she is recovering well from surgery and is doing well. She stated she has no needs at this time. She was thankful for the call and will reach out if any questions or needs arise.

## 2023-04-14 ENCOUNTER — HOSPITAL ENCOUNTER (OUTPATIENT)
Dept: PHYSICAL THERAPY | Facility: HOSPITAL | Age: 48
Setting detail: THERAPIES SERIES
Discharge: HOME | End: 2023-04-14
Payer: OTHER GOVERNMENT

## 2023-04-14 DIAGNOSIS — M25.611 DECREASED ROM OF RIGHT SHOULDER: ICD-10-CM

## 2023-04-14 DIAGNOSIS — Z91.89 AT RISK FOR LYMPHEDEMA: ICD-10-CM

## 2023-04-14 DIAGNOSIS — M25.612 DECREASED ROM OF LEFT SHOULDER: ICD-10-CM

## 2023-04-14 DIAGNOSIS — Z90.13 STATUS POST BILATERAL MASTECTOMY: Primary | ICD-10-CM

## 2023-04-14 PROCEDURE — 97535 SELF CARE MNGMENT TRAINING: CPT | Performed by: PHYSICAL THERAPIST

## 2023-04-14 PROCEDURE — 93702 BIS XTRACELL FLUID ANALYSIS: CPT | Performed by: PHYSICAL THERAPIST

## 2023-04-14 PROCEDURE — 97161 PT EVAL LOW COMPLEX 20 MIN: CPT | Performed by: PHYSICAL THERAPIST

## 2023-04-14 NOTE — THERAPY EVALUATION
Physical Therapy Lymphedema Initial Evaluation  [facility]     Patient Name: Dolores Dejesus  : 1975  MRN: [id number]  Today's Date: 2023      Visit Date: 2023    Visit Dx:    ICD-10-CM ICD-9-CM   1. Status post bilateral mastectomy  Z90.13 V45.71   2. At risk for lymphedema  Z91.89 V49.89   3. Decreased ROM of left shoulder  M25.612 719.51   4. Decreased ROM of right shoulder  M25.611 719.51       Patient Active Problem List   Diagnosis   • Near syncope   • Dizziness   • Migraine aura without headache   • Tachycardia   • Hx of migraine headaches   • POTS (postural orthostatic tachycardia syndrome)   • Palpitations   • Metabolic syndrome   • PSVT (paroxysmal supraventricular tachycardia)   • History of cardiac radiofrequency ablation (RFA)   • Atypical ductal hyperplasia of breast   • Ductal carcinoma in situ (DCIS) of right breast        Past Medical History:   Diagnosis Date   • Abnormal ECG May 2016   • Anxiety    • Asthma    • Breast atypical hyperplasia    • Breast cancer 2023   • Cholelithiasis 2022    Pain on my right side   • Chronic kidney disease     hx of kidney stones   • Coronary artery disease May 2026    POTS   • DDD (degenerative disc disease), lumbar    • Depression    • Ductal carcinoma in situ (DCIS) of right breast 2023   • GERD (gastroesophageal reflux disease)    • History of ear surgery    • Hx of hysterectomy 2018   • Hypertension    • Hypoglycemia    • Migraines    • Monoclonal (M) protein disease, 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   • PONV (postoperative nausea and vomiting) 2006    Torn acl repair work up from surgery very nauseous   • POTS (postural orthostatic tachycardia syndrome)    • Rectal bleeding 10/2021    Due to constipation   • S/P ACL surgery    • S/P removal of ovarian cyst         Past Surgical History:   Procedure Laterality Date   • ABLATION OF DYSRHYTHMIC FOCUS  2021    [facility]   • BREAST BIOPSY  Oct 2022    Need surgery right    • BREAST LUMPECTOMY Right 2023    Procedure: Right breast wire localized excisional biopsy;  Surgeon: Dasia Marroquin MD;  Location: [facility];  Service: General;  Laterality: Right;   • BREAST RECONSTRUCTION Bilateral 2023    Procedure: BILATERAL IMMEDIATE BREAST  RECONSTRUCTION  WITH TISSUE EXPANDERS AND BIOLOGIC;  Surgeon: Georgia Baer MD;  Location: [facility];  Service: Plastics;  Laterality: Bilateral;   • BREAST SURGERY  23    Found cancer   • CARDIAC CATHETERIZATION  2021   • CARDIOVASCULAR STRESS TEST  2018    R.Stress- 6 Min. 46 secs. 7.0 METS. 76% THR. BP- 121/73. Pt had CP. Inconclusive test   • CARDIOVASCULAR STRESS TEST  10/17/2018    L. Cardiolite- Negative.   • CARDIOVASCULAR STRESS TEST  2022    Lexiscan- EF > 70%. Negative   •  SECTION      X 3   • CHOLECYSTECTOMY     • CONVERTED (HISTORICAL) HOLTER  2017    @[facility].AVG HR 98 BPM   • CONVERTED (HISTORICAL) HOLTER  2021    <8 Days. .. 1 run of SVT   • CONVERTED (HISTORICAL) HOLTER  2021    Baseline sinus tach, AVG , current advised, follow with EP   • ECHO - CONVERTED  2017    @[facility]. EF 60%. Mild AI   • ECHO - CONVERTED  2017    EF 60%. MIld- Mod AI. No Shunt   • ECHO - CONVERTED  2018    EF 55%. Mild AI & MR. RVSP- 26 mmHg.   • ECHO - CONVERTED  2020    @ [facility]. EF 60%. Mild MR & AI. No AS. RVSP- 26 mmHg   • FOOT SURGERY Right    • HAND SURGERY Right    • KNEE ACL RECONSTRUCTION Left    • LAPAROSCOPIC TUBAL LIGATION     • MASTECTOMY W/ SENTINEL NODE BIOPSY Right 2023    Procedure: bilateral skin sparing mastectomy, right sentinel lymph node biopsy;  Surgeon: Dasia Marroquin MD;  Location: [facility];  Service: General;  Laterality: Right;   • OTHER SURGICAL HISTORY  07/10/2017    Extended Monitor- AVG HR 81 BPM. [place] an Syncope with no EKG changes   • OTHER SURGICAL HISTORY  2018    Tilt Table- Positive.   • OTHER  SURGICAL HISTORY  05/09/2019    [facility]:  High , low 51, AVG 82, sinus noted, rare PVC, no arrythymia   • RIGHT OOPHORECTOMY     • STAPEDECTOMY     • US CAROTID UNILATERAL  04/09/2019    [facility]:  No flow limiting stenosis bilaterally       Visit Dx:    ICD-10-CM ICD-9-CM   1. Status post bilateral mastectomy  Z90.13 V45.71   2. At risk for lymphedema  Z91.89 V49.89   3. Decreased ROM of left shoulder  M25.612 719.51   4. Decreased ROM of right shoulder  M25.611 719.51        Patient History     Row Name 04/14/23 1130             History    Chief Complaint Pain  -JS      Type of Pain Chest pain  -JS      Date Current Problem(s) Began 03/29/23  -JS      Brief Description of Current Complaint Pt s/p bilateral mastectomy, R SLNB (0/2) with immediate reconstruction with expander placement on 3/29/23. Pt had last 2 drains removed yesterday. Under current restrictions of no lifting >10#, no ROM bilateral shoulders for the next 2 weeks until return to MD. Oncology consult next week to determine need for chemotherapy. No radiation planned. C/o burning pain L axillary region. C/o bilateral swelling. MD cleared pt to discontinue use of Ace bandage & now in post-op bra. Lives 2[place]5 hours away, staying with her daughter in [place] through today.  -JS      Patient/Caregiver Goals Relieve pain;Improve mobility  -JS      Hand Dominance right-handed  -JS      Occupation/sports/leisure activities Does not work. Typically does housework. No prior exxercise.  -JS      Surgery/Hospitalization 3/29/23 B mastectomy, R SLNB  -JS         Pain     Pain Location Chest  -JS      Pain at Present 4  -JS      What Performance Factors Make the Current Problem(s) WORSE? Movement, reaching.  -JS      What Performance Factors Make the Current Problem(s) BETTER? Rest  -JS      Is your sleep disturbed? Yes  -JS      What position do you sleep in? --  Currently sleeping with wedge  -JS      Difficulties with ADL's?  assists with dressing,  reaching. Daughter assists with bathing, doing hair.  -JS         Fall Risk Assessment    Any falls in the past year: No  -JS         Daily Activities    Primary Language English  -JS      Are you able to read Yes  -JS      Are you able to write Yes  -JS      Pt Participated in POC and Goals Yes  -JS         Safety    Are you being hurt, hit, or frightened by anyone at home or in your life? No  -JS      Are you being neglected by a caregiver No  -JS            User Key  (r) = Recorded By, (t) = Taken By, (c) = Cosigned By    Initials Name Provider Type    JS Tyra Kirkland, PT Physical Therapist                 Lymphedema     Row Name 04/14/23 3105             Subjective Pain    Able to rate subjective pain? yes  -JS      Pre-Treatment Pain Level 4  -JS         Lymphedema Assessment    Lymphedema Surgery Comments B mastectomy, R SNLB with reconstruction & expander placement on 3/29/23.  -JS      Lymph Nodes Removed # 2  -JS      Positive Lymph Nodes # 0  -JS      Chemo Received no  scheduled for consult next week  -JS      Radiation Therapy Received no  -JS      Infections or Cellulitis? no  -JS         Posture/Observations    Alignment Options Forward head;Rounded shoulders  -JS      Forward Head Moderate  -JS      Rounded Shoulders Bilateral:;Moderate  -JS         General ROM    GENERAL ROM COMMENTS B elbow AROM WFL. Held on further shoulder ROM assessment due to post op restrictions  -JS         MMT (Manual Muscle Testing)    General MMT Comments Held due to post op restrictions  -JS         Lymphedema Edema Assessment    Edema Assessment Comment No significant UE edema noted.  -JS         Skin Changes/Observations    Skin Observations Comment Healing incisions bilaterally.  -JS         Lymphedema Measurements    Measurement Type(s) Quick Girth  -JS      Quick Girth Areas Upper extremities  -JS         LUE Quick Girth (cm)    Axilla 35.5 cm  -JS      Mid upper arm 33 cm  -JS      Elbow 26.5 cm  -JS      Mid forearm  22 cm  -JS      Wrist crease 16 cm  -JS      LUE Quick Girth Total 133  -JS         RUE Quick Girth (cm)    Axilla 36 cm  -JS      Mid upper arm 33.5 cm  -JS      Elbow 26.5 cm  -JS      Mid forearm 22.8 cm  -JS      Wrist crease 16.5 cm  -JS      RUE Quick Girth Total 135.3  -JS         Compression/Skin Care    Compression/Skin Care Comments Discussed continued use of post-operative bra.  Discussed use of ACE bandage if chest swelling persists.  -JS         L-Dex Bioimpedence Screening    L-Dex Measurement Extremity RUE  -JS      L-Dex Patient Position Standing  -JS      L-Dex UE Dominate Side Right  -JS      L-Dex UE At Risk Side Right  -JS      L-Dex UE Pre Surgical Value No  -JS      L-Dex UE Score 3.9  -JS      L-Dex UE Baseline Score 3.9  -JS      L-Dex UE Value Change 0  -JS      $ L-Dex Charge yes  -JS            User Key  (r) = Recorded By, (t) = Taken By, (c) = Cosigned By    Initials Name Provider Type    Tyra Dumont, PT Physical Therapist                                Therapy Education  Education Details: Bioimpedance score & significance of results reviewed. Discussed use of post-operative bra as indicated by her physician, use of ACE bandage as needed for swelling. Reviewed HEP that patient will begin when cleared by her physician with instruction via [employer] [id number]. Jobst Carmen Lite 20-30mm Hg compression garment will be ordered from [employer], education provided on use during exercise, heavy activity or air travel.  Education provided on signs/symptoms of lymphedema & Healthy Habits for lymphedma prevention handout reviewed & issued.       OP Exercises     Row Name 04/14/23 5049             Precautions    Existing Precautions/Restrictions other (see comments)  Per pt: No shoulder AROM, reaching. No lifting >10#.  -JS         Subjective Comments    Subjective Comments C/o burning pain L axilla.  -JS         Subjective Pain    Able to rate subjective pain? yes  -JS      Pre-Treatment Pain Level 4   -JS         Exercise 1    Exercise Name 1 Reviewed HEP that pt will begin when cleared by physician to begin AROM: Post shoulder rolls, scapular retraction, B shoulder ER AROM standing, supine shoulder flex AAROM (supine). B shoulder ER (supine hands behind head).  -JS            User Key  (r) = Recorded By, (t) = Taken By, (c) = Cosigned By    Initials Name Provider Type    Tyra Dumont, PT Physical Therapist                             PT OP Goals     Row Name 04/14/23 1130          PT Short Term Goals    STG Date to Achieve 05/14/23  -JS     STG 1 Patient will be independent in initial HEP to begin when cleared by physician.  -JS     STG 1 Progress New  -JS     STG 1 Progress Comments Instruction provided to patient & - advised to hold until cleared for ROM by her physician.  -JS        Long Term Goals    LTG Date to Achieve 07/13/23  -JS     LTG 1 Patient will be independent in comprehensive HEP to allow ongoing self management.  -JS     LTG 1 Progress New  -JS     LTG 2 Patient will acquire appropriately fitted compression garment and verbalize appropriate wear schedule.  -JS     LTG 2 Progress New  -JS     LTG 3 Patient will demonstrate full painfree AROM B UE.  -JS     LTG 3 Progress New  -JS     LTG 4 Patient will maintain LDex bioimpedance WNL.  -JS     LTG 4 Progress New  -JS     LTG 5 Patient will improve QuickDash score to 20% or less.  -JS     LTG 5 Progress New  -JS        Time Calculation    PT Goal Re-Cert Due Date 07/13/23  -JS           User Key  (r) = Recorded By, (t) = Taken By, (c) = Cosigned By    Initials Name Provider Type    Tyra Dumont, PT Physical Therapist                 PT Assessment/Plan     Row Name 04/14/23 1130          PT Assessment    Functional Limitations Limitation in home management;Limitations in community activities;Limitations in functional capacity and performance;Performance in self-care ADL  -JS     Impairments Edema;Impaired lymphatic circulation;Impaired  postural alignment;Muscle strength;Pain;Posture;Range of motion  -JS     Assessment Comments Dolores Dejesus is a 47 y.o. female recently diagnosed with Right Breast Cancer, presents to therapy in evolving condition, s/p bilateral mastectomy with right  [place] Node Biopsy  (0/2) with immediate reconstruction with Prepectoral Tissue expander/ (+) Bilateral performed on 3/29/23 by surgeons Dr. Marroquin & Dr. Baer. Dolores Dejesus is at increased risk of post Mastectomy lymphedema syndrome Right Upper Extremity due to Breast surgery Lymph Node Removal.  She presents 2 weeks post-op with drains removed yesterday with post-operative restriction of ROM therefore no formal measurements of strength & ROM taken today.  Reviewed post-operative ROM precautions with patient and . Patient presents with pain, c/o swelling in chest region.  Currently, negative s/s of lymphedema, infection, seroma, or axillary cording.  We did complete a baseline post operative bioimpedance assessment, with current L-Dex score of 3.9, which is WNL. Measurements taken and UE compression sleeve to be ordered.  Dolores Dejesus will benefit from skilled formal Breast Care PHysical Therapy at this time to address listed dysfunctions. Patient advised to contact PHYSICAL THERAPY with any questions, concerns or changes. Please refer to Plan.  -JS     Please refer to paper survey for additional self-reported information Yes  -JS     Rehab Potential Good  -JS     Patient/caregiver participated in establishment of treatment plan and goals Yes  -JS     Patient would benefit from skilled therapy intervention Yes  -JS        PT Plan    Predicted Duration of Therapy Intervention (PT) 3 month bioimpedance reassessment  -JS     Planned CPT's? PT EVAL LOW COMPLEXITY: 85206;PT RE-EVAL: 03709;PT THER PROC EA 15 MIN: 18429;PT THER ACT EA 15 MIN: 88391;PT MANUAL THERAPY EA 15 MIN: 51714;PT BIS XTRACELL FLUID ANALYSIS: 35655;PT SELF CARE/MGMT/TRAIN 15 MIN: 46948  -JS      Physical Therapy Interventions (Optional Details) bioimpedance assessment;home exercise program;manual lymphatic drainage;manual therapy techniques;neuromuscular re-education;patient/family education;postural re-education;strengthening;stretching;ROM (Range of Motion)  -MATT     PT Plan Comments Pt to receive UE compression sleeve from [employer], to be used during heavy activity, exercise or air travel.  To hold on initiating ROM HEP until cleared by her physician.  To return for 3 month bioimpedance reassessment.  -MATT           User Key  (r) = Recorded By, (t) = Taken By, (c) = Cosigned By    Initials Name Provider Type    Tyra Dumont, PT Physical Therapist                   Outcome Measure Options: Quick DASH  Quick DASH  Open a tight or new jar.: Moderate Difficulty  Do heavy household chores (e.g., wash walls, wash floors): Unable  Carry a shopping bag or briefcase: Severe Difficulty  Wash your back: Unable  Use a knife to cut food: Unable  Recreational activities in which you take some force or impact through your arm, should or hand (e.g. golf, hammering, tennis, etc.): Unable  During the past week, to what extent has your arm, shoulder, or hand problem interfered with your normal social activites with family, friends, neighbors or groups?: Slightly  During the past week, were you limited in your work or other regular daily activities as a result of your arm, shoulder or hand problem?: Unable  Arm, Shoulder, or hand pain: Severe  Tingling (pins and needles) in your arm, shoulder, or hand: Severe  During the past week, how much difficulty have you had sleeping because of the pain in your arm, shoulder or hand?: Moderate Difficiculty  Number of Questions Answered: 11  Quick DASH Score: 77.27         Time Calculation:   Start Time: 1130  Stop Time: 1220  Time Calculation (min): 50 min   Therapy Charges for Today     Code Description Service Date Service Provider Modifiers Qty    04616898073 HC PT BIS XTRACELL  FLUID ANALYSIS 4/14/2023 Tyra Kirkland, PT  1    12417264234 HC PT EVAL LOW COMPLEXITY 2 4/14/2023 Tyra Kirkland, PT GP 1    05708754861 HC PT SELF CARE/MGMT/TRAIN EA 15 MIN 4/14/2023 Tyra Kirkland, PT GP 1          PT G-Codes  Outcome Measure Options: Quick DASH  Quick DASH Score: 77.27         Tyra Kirkland, ELISHA  4/14/2023

## 2023-04-18 ENCOUNTER — CONSULT (OUTPATIENT)
Dept: ONCOLOGY | Facility: CLINIC | Age: 48
End: 2023-04-18
Payer: OTHER GOVERNMENT

## 2023-04-18 ENCOUNTER — LAB (OUTPATIENT)
Dept: LAB | Facility: HOSPITAL | Age: 48
End: 2023-04-18
Payer: OTHER GOVERNMENT

## 2023-04-18 VITALS
TEMPERATURE: 97.5 F | OXYGEN SATURATION: 97 % | HEART RATE: 83 BPM | BODY MASS INDEX: 30.87 KG/M2 | DIASTOLIC BLOOD PRESSURE: 75 MMHG | RESPIRATION RATE: 16 BRPM | WEIGHT: 180.8 LBS | SYSTOLIC BLOOD PRESSURE: 107 MMHG | HEIGHT: 64 IN

## 2023-04-18 DIAGNOSIS — N60.92 ATYPICAL DUCTAL HYPERPLASIA OF BREAST, BILATERAL: ICD-10-CM

## 2023-04-18 DIAGNOSIS — D05.10 DUCTAL CARCINOMA IN SITU (DCIS) OF BREAST, UNSPECIFIED LATERALITY: Primary | ICD-10-CM

## 2023-04-18 DIAGNOSIS — N60.91 ATYPICAL DUCTAL HYPERPLASIA OF BREAST, BILATERAL: ICD-10-CM

## 2023-04-18 DIAGNOSIS — D05.11 BREAST NEOPLASM, TIS (DCIS), RIGHT: Primary | ICD-10-CM

## 2023-04-18 LAB
BASOPHILS # BLD AUTO: 0.07 10*3/MM3 (ref 0–0.2)
BASOPHILS NFR BLD AUTO: 0.7 % (ref 0–1.5)
DEPRECATED RDW RBC AUTO: 43.5 FL (ref 37–54)
EOSINOPHIL # BLD AUTO: 0.74 10*3/MM3 (ref 0–0.4)
EOSINOPHIL NFR BLD AUTO: 7.8 % (ref 0.3–6.2)
ERYTHROCYTE [DISTWIDTH] IN BLOOD BY AUTOMATED COUNT: 12.6 % (ref 12.3–15.4)
HCT VFR BLD AUTO: 42.3 % (ref 34–46.6)
HGB BLD-MCNC: 14 G/DL (ref 12–15.9)
IMM GRANULOCYTES # BLD AUTO: 0.06 10*3/MM3 (ref 0–0.05)
IMM GRANULOCYTES NFR BLD AUTO: 0.6 % (ref 0–0.5)
LYMPHOCYTES # BLD AUTO: 1.93 10*3/MM3 (ref 0.7–3.1)
LYMPHOCYTES NFR BLD AUTO: 20.4 % (ref 19.6–45.3)
MCH RBC QN AUTO: 31.2 PG (ref 26.6–33)
MCHC RBC AUTO-ENTMCNC: 33.1 G/DL (ref 31.5–35.7)
MCV RBC AUTO: 94.2 FL (ref 79–97)
MONOCYTES # BLD AUTO: 0.53 10*3/MM3 (ref 0.1–0.9)
MONOCYTES NFR BLD AUTO: 5.6 % (ref 5–12)
NEUTROPHILS NFR BLD AUTO: 6.13 10*3/MM3 (ref 1.7–7)
NEUTROPHILS NFR BLD AUTO: 64.9 % (ref 42.7–76)
NRBC BLD AUTO-RTO: 0 /100 WBC (ref 0–0.2)
PLATELET # BLD AUTO: 324 10*3/MM3 (ref 140–450)
PMV BLD AUTO: 9.6 FL (ref 6–12)
RBC # BLD AUTO: 4.49 10*6/MM3 (ref 3.77–5.28)
WBC NRBC COR # BLD: 9.46 10*3/MM3 (ref 3.4–10.8)

## 2023-04-18 PROCEDURE — 85025 COMPLETE CBC W/AUTO DIFF WBC: CPT

## 2023-04-18 PROCEDURE — 36415 COLL VENOUS BLD VENIPUNCTURE: CPT

## 2023-04-18 NOTE — PROGRESS NOTES
Subjective     REASON FOR CONSULTATION:     Pathologic Tis N0 M0, stage 0, newly diagnosed right breast ductal carcinoma in situ, grade 1 ER %, OK %, RAFAEL 6710%  Provide an opinion on any further workup or treatment                             REQUESTING PHYSICIAN: Dr. Liliya Cain    RECORDS OBTAINED:  Records of the patients history including those obtained from the referring provider were reviewed and summarized in detail.    HISTORY OF PRESENT ILLNESS:  The patient is a 47 y.o. year old female who is here for an opinion about the above issue.    History of Present Illness     Patient is a 47-year-old female who has had problems with itching on the right breast for quite some time.  She had bilateral diagnostic mammogram with ultrasound by seen on ultrasound.    Patient has a family history of breast cancer in 1 sister who was 52 and  of breast cancer at 54 with liver metastasis.  A second sister had breast cancer at age 30 and is currently alive at age 50.  She required chemotherapy.  Her maternal aunt's daughter had breast cancer and another maternal aunt had brain tumor in her 50s.  On the paternal side there is leukemia in a cousin at age 50.  Patient sisters have not been tested but patient tells me that her testing was done.  [employer] genetic test last week, results are pending.  Patient states that she had BRCA testing done in the past by Dr. Adin kenyon in  because of her 2 sisters being diagnosed with breast cancer and her BRCA testing was negative but we do not have the actual report.    Details are as follows    10/12/2022 bilateral diagnostic mammogram with ultrasound:  Finding 1: There is no radiographic abnormality in the region of the pain in the right breast at 9:00.  There are no suspicious masses, calcifications, or areas of architectural distortion.  Finding 2: There is no radiographic abnormality in the region of the pain in the left breast at 6:00.  No solid or  suspicious masses are seen.  Remainder of report pending.    October 12, 2022: Bilateral breast ultrasound showed  1.  Simple cyst with circumscribed margins 4 mm at 9 o'clock position which correlates with pain in the right breast  2.  There is no sonographic correlate for the area of pain in the left breast at 6:00.  No solid or suspicious masses seen  3.  There is a oval parallel complicated cyst versus solid mass with circumscribed margins 8 x 5 x 6 mm at the 10:30 position in the right breast 6 cm from the nipple.     11/10/2022 right breast ultrasound-guided biopsy:  The mass in the right breast at 1030 was located.  5 cores were obtained.  A mini cork tissue marker was placed.  Clip was in satisfactory position.  Pathology returned as atypical ductal hyperplasia which is concordant.     11/10/2022 pathology:  Right breast, 1030:  Atypical ductal hyperplasia     12/27/2022 Bilateral Breast MRI   IMPRESSION:  High risk screening breast MR demonstrating no MR findings of malignancy.   BIRADS: DIAGNOSTIC CATEGORY 1--NEGATIVE.       2/9/2023 Right breast wire localized excisional biopsy  Final Diagnosis   1. Right Breast, Oriented Needle Localization Lumpectomy (11 grams): LOW GRADE DUCTAL CARCINOMA IN SITU (DCIS).  A. Tumor site: 10:30 o'clock.               B. Extent of DCIS: DCIS is multifocal, contiguously spanning approximately 25 mm from medial to lateral.               C. Architecture patterns: Solid and micropapillary.               D. Nuclear grade: Low.               E. Margins: DCIS focally extends to the anterior (3.0 mm focus) and inferior (0.4 mm) margins, is present 1.5 mm       from the superior margin (2.0 mm focus), 2.3 mm from the posterior margin and 5 mm from medial and lateral       margins of excision.  F. No lymph nodes submitted.  G. Hormone receptor status: ER moderately positive, MO strongly positive, Ki67 ~10% (see biomarker template).   H. Pathologic stage: pTis, NX.       3/29/2023  Bilateral breast skin sparing mastectomy with right sentinel lymph node biopsy   Final Diagnosis   1. Breast, Left, Mastectomy: Breast tissue with  A. Atypical ductal hyperplasia.  B. Florid ductal hyperplasia of the usual type.  C. Simple cyst.  D. Secretory change.  E. Duct ectasia.  F. Clustered cysts.     2. Lymph Node, Right Jasper Node #1, Excision (2 mm slices):               A. Single benign lymph node.     3. Breast, Right, Mastectomy: Ductal carcinoma in situ.               A. The ductal carcinoma in situ is of low to intermediate nuclear grade with solid and cribriform architecture and        focal necrosis.   B. The ductal carcinoma in situ measures up to 40 mm  (4 consecutive 1 cm slices).  C. The ductal carcinoma in situ comes within 2.5 mm of the posterior margin, 10 mm of the medial margin,       16 mm of the anterior margin, 75 mm of the inferior margin, 85 mm of the superior and 130 mm of the lateral         margin.  D. Microcalcifications are not associated with the ductal carcinoma in situ.  E. Calcifications are present with non-neoplastic tissue.  F. Foci of atypical ductal hyperplasia  G. Lumpectomy site changes are present with the ductal carcinoma in situ surrounding the areas of the        lumpectomy site.     Comment: The case has been previously staged [id number] that will not be repeated as only residual ductal carcinoma in situ is identified. It was originally staged as Pathologic stage pTis, NX. Given the additional lymph nodes it is pTis, pN0. Hormone receptors were performed on prior tissue and documented in case [id number] which are ER moderate positivity, TX strongly positive, Ki-67 10%. The ER was 81-90% positive. The TX was % positive.     4. Lymph Node, Right Jasper Node #2, Excision (2 mm slices):               A. Single benign lymph node.       Patient is healing from surgery and referred here for evaluation following bilateral mastectomy with  reconstruction      Past Medical History:   Diagnosis Date   • Abnormal ECG May 2016   • Anxiety    • Asthma    • Breast atypical hyperplasia    • Breast cancer 2023   • Cholelithiasis 2022    Pain on my right side   • Chronic kidney disease     hx of kidney stones   • Coronary artery disease May 2026    POTS   • DDD (degenerative disc disease), lumbar    • Depression    • Ductal carcinoma in situ (DCIS) of right breast 2023   • GERD (gastroesophageal reflux disease)    • History of ear surgery    • Hx of hysterectomy 2018   • Hypertension    • Hypoglycemia    • Migraines    • Monoclonal (M) protein disease, 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   • PONV (postoperative nausea and vomiting) 2006    Torn acl repair work up from surgery very nauseous   • POTS (postural orthostatic tachycardia syndrome)    • Rectal bleeding 10/2021    Due to constipation   • S/P ACL surgery    • S/P removal of ovarian cyst         Past Surgical History:   Procedure Laterality Date   • ABLATION OF DYSRHYTHMIC FOCUS  2021    [facility]   • BREAST BIOPSY  Oct 2022    Need surgery right   • BREAST LUMPECTOMY Right 2023    Procedure: Right breast wire localized excisional biopsy;  Surgeon: Dasia Marroquin MD;  Location: [facility];  Service: General;  Laterality: Right;   • BREAST RECONSTRUCTION Bilateral 2023    Procedure: BILATERAL IMMEDIATE BREAST  RECONSTRUCTION  WITH TISSUE EXPANDERS AND BIOLOGIC;  Surgeon: Georgia Baer MD;  Location: [facility];  Service: Plastics;  Laterality: Bilateral;   • BREAST SURGERY  23    Found cancer   • CARDIAC CATHETERIZATION  2021   • CARDIOVASCULAR STRESS TEST  2018    R.Stress- 6 Min. 46 secs. 7.0 METS. 76% THR. BP- 121/73. Pt had CP. Inconclusive test   • CARDIOVASCULAR STRESS TEST  10/17/2018    L. Cardiolite- Negative.   • CARDIOVASCULAR STRESS TEST  2022    Lexiscan- EF > 70%. Negative   •  SECTION      X 3   • CHOLECYSTECTOMY      • CONVERTED (HISTORICAL) HOLTER  06/01/2017    @[facility].AVG HR 98 BPM   • CONVERTED (HISTORICAL) HOLTER  08/25/2021    <8 Days. .. 1 run of SVT   • CONVERTED (HISTORICAL) HOLTER  07/30/2021    Baseline sinus tach, AVG , current advised, follow with EP   • ECHO - CONVERTED  06/14/2017    @[facility]. EF 60%. Mild AI   • ECHO - CONVERTED  06/22/2017    EF 60%. MIld- Mod AI. No Shunt   • ECHO - CONVERTED  08/21/2018    EF 55%. Mild AI & MR. RVSP- 26 mmHg.   • ECHO - CONVERTED  08/03/2020    @ [facility]. EF 60%. Mild MR & AI. No AS. RVSP- 26 mmHg   • FOOT SURGERY Right    • HAND SURGERY Right    • KNEE ACL RECONSTRUCTION Left    • LAPAROSCOPIC TUBAL LIGATION     • MASTECTOMY W/ SENTINEL NODE BIOPSY Right 03/29/2023    Procedure: bilateral skin sparing mastectomy, right sentinel lymph node biopsy;  Surgeon: Dasia Marroquin MD;  Location: [facility];  Service: General;  Laterality: Right;   • OTHER SURGICAL HISTORY  07/10/2017    Extended Monitor- AVG HR 81 BPM. [place] an Syncope with no EKG changes   • OTHER SURGICAL HISTORY  09/18/2018    Tilt Table- Positive.   • OTHER SURGICAL HISTORY  05/09/2019    [facility]:  High , low 51, AVG 82, sinus noted, rare PVC, no arrythymia   • RIGHT OOPHORECTOMY     • STAPEDECTOMY     • US CAROTID UNILATERAL  04/09/2019    Jensen:  No flow limiting stenosis bilaterally        Current Outpatient Medications on File Prior to Visit   Medication Sig Dispense Refill   • ALBUTEROL SULFATE HFA IN Inhale Every 4 (Four) Hours As Needed.     • bisoprolol (ZEBeta) 5 MG tablet Take 0.5 tablets by mouth every night at bedtime. 30 tablet 6   • butalbital-acetaminophen-caffeine (FIORICET, ESGIC) -40 MG per tablet Take 1 tablet by mouth Every 4 (Four) Hours As Needed for Headache.     • Cyanocobalamin (Vitamin B 12) 500 MCG tablet Take 1 tablet by mouth Daily. HELD FOR SURGERY     • cyclobenzaprine (FLEXERIL) 10 MG tablet Take 2 tablets by mouth Daily. 2 tablets daily     • diazePAM (VALIUM)  2 MG tablet Take 1 tablet by mouth 3 (Three) Times a Day.     • diclofenac (VOLTAREN) 50 MG EC tablet Take 1 tablet by mouth 2 (Two) Times a Day. HOLD PER MD INSTRUCTIONS PRIOR TO SURGERY     • Diclofenac Sodium (VOLTAREN) 1 % gel gel Apply 4 g topically to the appropriate area as directed Daily.     • dilTIAZem SR (CARDIZEM SR) 60 MG 12 hr capsule Take 1 capsule by mouth Daily As Needed (for increased heart rate). Once daily with extra tablet if needed for increased heart rate 90 capsule 3   • Erenumab-aooe (AIMOVIG, 140 MG DOSE, SC) Inject 1 dose under the skin into the appropriate area as directed Every 30 (Thirty) Days.     • fludrocortisone 0.1 MG tablet Take 2 tablets by mouth Daily. 60 tablet 6   • fluticasone (FLONASE) 50 MCG/ACT nasal spray 2 sprays into the nostril(s) as directed by provider Every Night.     • linaclotide (LINZESS) 290 MCG capsule capsule Take 1 capsule by mouth Every Morning Before Breakfast.     • Loratadine 10 MG capsule Take 1 capsule by mouth Daily.     • Magnesium 125 MG capsule Take  by mouth.     • methocarbamol (ROBAXIN) 750 MG tablet Take 1 tablet by mouth Every 4 (Four) Hours.     • metoclopramide (REGLAN) 10 MG tablet Take 1 tablet by mouth 2 (Two) Times a Day Before Meals.     • NON FORMULARY Epidural-Bupivicane-lidocaine injections every 3 months     • omeprazole (priLOSEC) 40 MG capsule Take 1 capsule by mouth Daily.     • OnabotulinumtoxinA (BOTOX IJ) Inject  as directed Every 3 (Three) Months.     • sertraline (ZOLOFT) 100 MG tablet Take 1 tablet by mouth 2 (Two) Times a Day.     • Spiriva Respimat 1.25 MCG/ACT aerosol solution inhaler Inhale 2 puffs Daily.     • topiramate (TOPAMAX) 50 MG tablet Take 1.5 tablets by mouth 2 (Two) Times a Day. 1 1/2 tablets twice a day     • traZODone (DESYREL) 50 MG tablet Take 1 tablet by mouth Every Night.       No current facility-administered medications on file prior to visit.        ALLERGIES:    Allergies   Allergen Reactions   •  Carbamates Hives   • Carisoprodol Hives   • Triptans Hives        Social History     Socioeconomic History   • Marital status:      Spouse name: Darien   Tobacco Use   • Smoking status: Never   • Smokeless tobacco: Never   Vaping Use   • Vaping Use: Never used   Substance and Sexual Activity   • Alcohol use: Yes     Alcohol/week: 2.0 standard drinks     Types: 2 Drinks containing 0.5 oz of alcohol per week     Comment: Maybe once a month if we go out to dinner   • Drug use: No   • Sexual activity: Yes     Partners: Female     Birth control/protection: None, Tubal ligation, Hysterectomy     Comment: 2016 Hysterectomy was done leaving one overy left        Family History   Problem Relation Age of Onset   • Diabetes Mother    • Arthritis Father    • Asthma Father         Covid related   • Breast cancer Sister    • Cancer Sister         Liver 2020 passed 2022   • Early death Sister         Liver cancer  2022   • No Known Problems Sister    • Cancer Sister         Breast right    • Miscarriages / Stillbirths Daughter         Miscarriage/ stillbirth   • Heart disease Son         Caused by Kawasaki disease   • Heart attack Son    • Autism Son    • Learning disabilities Son         Autism   • Miscarriages / Stillbirths Son         Misscarriage/ stillbirth   • Heart attack Paternal Grandfather         Heart Attack   • Heart disease Paternal Grandfather    • Malig Hyperthermia Neg Hx       OB/GYN history: Age of menstruation  13  Age of menopause in 2016 she had a hysterectomy with 1 ovary being removed  She is  7 para 5 2 miscarriages.  She has not been on birth control pills      Review of Systems   Constitutional: Negative for appetite change, chills, diaphoresis, fatigue, fever and unexpected weight change.   HENT: Negative for hearing loss, sore throat and trouble swallowing.    Respiratory: Negative for cough, chest tightness, shortness of breath and wheezing.    Cardiovascular:  "Negative for chest pain, palpitations and leg swelling.   Gastrointestinal: Negative for abdominal distention, abdominal pain, constipation, diarrhea, nausea and vomiting.   Genitourinary: Negative for dysuria, frequency, hematuria and urgency.   Musculoskeletal: Negative for joint swelling.        No muscle weakness.   Skin: Negative for rash and wound.   Neurological: Negative for seizures, syncope, speech difficulty, weakness, numbness and headaches.   Hematological: Negative for adenopathy. Does not bruise/bleed easily.   Psychiatric/Behavioral: Negative for behavioral problems, confusion and suicidal ideas.        Objective     Vitals:    04/18/23 1407   BP: 107/75   Pulse: 83   Resp: 16   Temp: 97.5 °F (36.4 °C)   TempSrc: Temporal   SpO2: 97%   Weight: 82 kg (180 lb 12.8 oz)   Height: 162.6 cm (64.02\")   PainSc: 7  Comment: Sharp pain   PainLoc: Breast  Comment: on the side of left breast under armpit         4/18/2023     2:03 PM   Current Status   ECOG score 0       Physical Exam    Patient screened positive for depression based on a PHQ-9 score of 6 on 2/28/2023. Follow-up recommendations include: PCP managing depression.     CONSTITUTIONAL:  Vital signs reviewed.  No distress, looks comfortable.  RESPIRATORY:  Normal respiratory effort.  Lungs clear to auscultation bilaterally.  S/p bilateral mastectomy with reconstruction.  Patient is healing up well.  There is no axillary adenopathy and no supraclavicular adenopathy.  She has expanders in place  CARDIOVASCULAR:  Normal S1, S2.  No murmurs rubs or gallops.  No significant lower extremity edema.  GASTROINTESTINAL: Abdomen appears unremarkable.  Nontender.  No hepatomegaly.  No splenomegaly.  LYMPHATIC:  No cervical, supraclavicular, axillary lymphadenopathy.  SKIN:  Warm.  No rashes.  PSYCHIATRIC:  Normal judgment and insight.  Normal mood and affect.    RECENT LABS:  Hematology WBC   Date Value Ref Range Status   04/18/2023 9.46 3.40 - 10.80 10*3/mm3 " Final   02/25/2021 3.81 3.70 - 11.00 k/uL Final   08/31/2018 26 to 100 0 - 5 /hpf Final     Comment:     Culture indicated, results to follow.     RBC   Date Value Ref Range Status   04/18/2023 4.49 3.77 - 5.28 10*6/mm3 Final   02/25/2021 4.15 3.90 - 5.20 m/uL Final     Hemoglobin   Date Value Ref Range Status   04/18/2023 14.0 12.0 - 15.9 g/dL Final   02/25/2021 12.1 11.5 - 15.5 g/dL Final     Hematocrit   Date Value Ref Range Status   04/18/2023 42.3 34.0 - 46.6 % Final   02/25/2021 38.0 36.0 - 46.0 % Final     Platelets   Date Value Ref Range Status   04/18/2023 324 140 - 450 10*3/mm3 Final   02/25/2021 255 150 - 400 k/uL Final          Assessment & Plan     #. Pathologic Tis N0 M0, stage 0, newly diagnosed right breast ductal carcinoma in situ, grade 1 ER %, ID %, RAFAEL 6710%  · Initially had symptoms of itching in the right breast  · Bilateral diagnostic mammogram and ultrasound on October 2022 showed that the bilateral diagnostic mammogram was negative but the ultrasound showed a lesion at 10:30 position which could be cyst versus solid mass  · Ultrasound-guided biopsy of the mass showed atypical ductal hyperplasia  · February 9, 2022: Right breast excisional biopsy showed ductal carcinoma in situ, multifocal  · S/p bilateral mastectomy with sentinel lymph nodes on the right sentinel lymph node surgery showed left breast was showing atypical ductal hyperplasia but right breast showed evidence of ductal carcinoma in situ.  40 mm ER/ID positive s/p bilateral men mastectomy and reconstruction  · Patient does not require any endocrine therapy given that she has had bilateral mastectomy  · [employer] genetic testing has been done and we will await the results of that  · Patient lives in [facility] and has obtained BRCA testing at [facility] which apparently was negative and was done by Dr. Adin Boyd   · Patient is s/p bilateral mastectomy for DCIS and does not require any endocrine therapy    #.   Family history of breast cancer  · Patient's sister had breast cancer at age 50 and  of breast cancer at age 52 with liver metastasis  · Patient had a second sister who had breast cancer at age 30 and required chemotherapy and still alive at 50  · Maternal cousin had brain cancer and paternal cousin had leukemia around 50  · Patient had BRCA testing in  but did not have the results  · [facility] genetic test was done in Dr. Marroquin's office    #.  Pain at the left chest wall site at the left axillary region likely secondary to surgery but no evidence of infection    Plan  · Had lengthy discussion with patient and patient's  that given bilateral mastectomies patient does not require any chemoprophylaxis  · However we will await the results of genetic testing, she had [facility] genetic test done and will try to get the records it was drawn by at Dr. Marroquin's office  · Follow-up with me video visit in 2 months at which time we can discuss the results of the [employer] genetic testing if [employer] genetic testing is negative she can be discharged from our clinic and follow-up with Dr. Marroquin's office which she has an appointment in 1 year.  · If her pain worsens she will call Dr. Marroquin's office    Thank you for allowing me to participate in the care of this patient     I spent 60 total minutes, face-to-face, caring for Dolores today.  Greater than 50% of this time involved counseling and/or coordination of care as documented within this note.    MD Dr. Liliya Parker

## 2023-04-20 ENCOUNTER — TELEPHONE (OUTPATIENT)
Dept: SURGERY | Facility: CLINIC | Age: 48
End: 2023-04-20
Payer: OTHER GOVERNMENT

## 2023-05-02 ENCOUNTER — PATIENT OUTREACH (OUTPATIENT)
Dept: OTHER | Facility: HOSPITAL | Age: 48
End: 2023-05-02
Payer: OTHER GOVERNMENT

## 2023-05-02 ENCOUNTER — TELEPHONE (OUTPATIENT)
Dept: OTHER | Facility: HOSPITAL | Age: 48
End: 2023-05-02
Payer: OTHER GOVERNMENT

## 2023-05-02 NOTE — TELEPHONE ENCOUNTER
Oncology Social Work  Distress Screening Follow-up    Diagnosis: Right breast ductal carcinoma in situ    Distress Level: 5 (4/18/2023  3:00 PM)    Physical Concerns:    Fatigue: Y  Pain: Y  Sleep: Y    Practical Problems:    : Y  Treatment decisions: Y       Interventions:     Financial Needs: FMLA/st disability/lt disability (Discussed FMLA paperwork for her )  Emotional Needs: emotional suppport/coping strategies    Comments:  Spoke to the patient and discussed her distress screening score. OSW actively listened to the patient as she stated that her concerns are arising from her pain in her breast and she would like it addressed.  Message was sent to nurse navigator Xiomara to see if she is able to provide the patient with more direction in how to pursue pain relief.  OSW will follow up with the patient as she currently denies any supportive oncology needs.  SOLO Ramirez

## 2023-05-02 NOTE — PROGRESS NOTES
Message receieved from Raadsaman DENNY regarding patient needs. Called and spoke with patient about her pain concners. We discsused ibuprofen, positioning and PT options. Message sent to Dr. Marroquin and Dr. Baer to discuss these options and see if PT closer to home would be an option for her. She was thankful for the help and will reach out if the need arises.

## 2023-05-05 ENCOUNTER — TELEPHONE (OUTPATIENT)
Dept: SURGERY | Facility: CLINIC | Age: 48
End: 2023-05-05
Payer: OTHER GOVERNMENT

## 2023-06-07 ENCOUNTER — PATIENT OUTREACH (OUTPATIENT)
Dept: OTHER | Facility: HOSPITAL | Age: 48
End: 2023-06-07
Payer: OTHER GOVERNMENT

## 2023-06-07 NOTE — PROGRESS NOTES
Called Ms. Dejesus to see how she was doing. She stated she has pain off and on but it seems to be getting better as she does PT. Encouraged her to keep going and working on it. She also had concerns with billing through [facility] and the facility in [place] where she had her mammogram. Encouraged her to speak with a [facility] representative about what needs to happen with her billing situation. She stated she has been doing that and will also call the facility in [place] to see what needs to happen. She was thankful for the call and will reach out if any questions or needs arise.

## 2023-06-08 RX ORDER — BISOPROLOL FUMARATE 5 MG/1
TABLET, FILM COATED ORAL
Qty: 90 TABLET | Refills: 3 | Status: SHIPPED | OUTPATIENT
Start: 2023-06-08

## 2023-07-25 ENCOUNTER — PATIENT OUTREACH (OUTPATIENT)
Dept: OTHER | Facility: HOSPITAL | Age: 48
End: 2023-07-25
Payer: OTHER GOVERNMENT

## 2023-07-25 NOTE — PROGRESS NOTES
Called Ms. Dejesus to see how she was doing. She stated she is recovering and will follow with her plastic surgeon regarding next steps for surgery. She stated she needs another order for PT to [facility]. Will discuss options with Dr. Marroquin and let her know best options. Otherwise, she has no needs at this time. She was thankful for the call and will reach out if any questions or needs arise.

## 2023-08-10 DIAGNOSIS — D05.11 DUCTAL CARCINOMA IN SITU OF RIGHT BREAST: Primary | ICD-10-CM

## 2023-08-10 NOTE — PROGRESS NOTES
Called Ms. Dejesus and let her know the orders for PT in [facility] and for lymphedema care with OT in [place] have been placed. Gave her contact info for Lymphedema clinic so she can coordinate appointment times. She was thankful for the help.

## 2023-08-21 DIAGNOSIS — R00.2 PALPITATIONS: ICD-10-CM

## 2023-08-21 DIAGNOSIS — R00.0 TACHYCARDIA: ICD-10-CM

## 2023-08-21 DIAGNOSIS — I49.5 TACHY-BRADY SYNDROME: ICD-10-CM

## 2023-08-21 DIAGNOSIS — R42 DIZZINESS: ICD-10-CM

## 2023-08-22 RX ORDER — DILTIAZEM HYDROCHLORIDE 60 MG/1
CAPSULE, EXTENDED RELEASE ORAL
Qty: 30 CAPSULE | Refills: 7 | Status: SHIPPED | OUTPATIENT
Start: 2023-08-22

## 2023-10-03 ENCOUNTER — PATIENT OUTREACH (OUTPATIENT)
Dept: OTHER | Facility: HOSPITAL | Age: 48
End: 2023-10-03
Payer: OTHER GOVERNMENT

## 2023-10-03 NOTE — PROGRESS NOTES
Called MsYasmany Oleg to see how she was doing. Left a message with my contact information and asked her to call back at her convenience.Will mail survivorship info as well.

## 2023-10-25 ENCOUNTER — OFFICE VISIT (OUTPATIENT)
Dept: CARDIOLOGY | Facility: CLINIC | Age: 48
End: 2023-10-25
Payer: OTHER GOVERNMENT

## 2023-10-25 VITALS
SYSTOLIC BLOOD PRESSURE: 118 MMHG | BODY MASS INDEX: 29.33 KG/M2 | DIASTOLIC BLOOD PRESSURE: 70 MMHG | HEART RATE: 92 BPM | WEIGHT: 171.8 LBS | HEIGHT: 64 IN

## 2023-10-25 DIAGNOSIS — R00.2 PALPITATIONS: ICD-10-CM

## 2023-10-25 DIAGNOSIS — G90.A POTS (POSTURAL ORTHOSTATIC TACHYCARDIA SYNDROME): Primary | ICD-10-CM

## 2023-10-25 DIAGNOSIS — R42 DIZZINESS: ICD-10-CM

## 2023-10-25 DIAGNOSIS — R00.0 TACHYCARDIA: ICD-10-CM

## 2023-10-25 DIAGNOSIS — I47.10 PSVT (PAROXYSMAL SUPRAVENTRICULAR TACHYCARDIA): ICD-10-CM

## 2023-10-25 DIAGNOSIS — E66.3 OVERWEIGHT: ICD-10-CM

## 2023-10-25 DIAGNOSIS — D05.11 BREAST NEOPLASM, TIS (DCIS), RIGHT: ICD-10-CM

## 2023-10-25 DIAGNOSIS — I49.5 TACHY-BRADY SYNDROME: ICD-10-CM

## 2023-10-25 RX ORDER — DILTIAZEM HYDROCHLORIDE 60 MG/1
CAPSULE, EXTENDED RELEASE ORAL
Qty: 180 CAPSULE | Refills: 2 | Status: SHIPPED | OUTPATIENT
Start: 2023-10-25

## 2023-10-25 RX ORDER — BISOPROLOL FUMARATE 5 MG/1
2.5 TABLET, FILM COATED ORAL NIGHTLY
Qty: 90 TABLET | Refills: 3 | Status: SHIPPED | OUTPATIENT
Start: 2023-10-25

## 2023-10-25 NOTE — PROGRESS NOTES
Chief Complaint   Patient presents with    Follow-up     Pt is here for cardiac follow up.    Pt reports CP, but is not sure if it is related to her breast surgeries.  She does have some dizziness.  She also reports some palpitations.  She denies SOB.  Pt does not take a daily ASA.    Med Refill     Medications were verified by the pt.      Lab Work     Pt states their last labs were in April 2023.         Cardiac Complaints  none      Subjective   Dolores Dejesus is a 47 y.o. female with anxiety, POTS, palpitations, migraines, and syncope.She underwent multiple investigations including echocardiogram, Holter monitor, stress test, and a Tilt table.  She had normal LV systolic function, mild valvular regurgitation and no ischemia.  The tilt table was positive for hypotension and near syncopal episode. Symptoms persisted and MCOT ordered. Results showed an average heart rate of 82 with no arrythmia and several instances of sinus tachycardia for which patient was symptomatic.  She was advised to add metoprolol prn but became symptomatic with medication. It was then changed to CCB (diltiazem 30mg)  to be used prn if palpitations should occur. Carotid US was negative.  In April 2020 she saw Dr. Martínez.  He recommended referral to autonomic dysfunction clinic, specifically  at [facility]. She was seen at [facility] in October and underwent Extensive workup was done including normal echocardiogram, 26 day cardiac telemetry in which symptoms correlated with sinus tach, and unremarkable carotid ultrasound. Neuro cardio autonomic test panel reported no evidence of orthostatic tachycardia or orthostatic tachycardia or hypotension.  Workup 2022 EF 70%, no ischemic burden.  Remains followed by EP for autonomic dysfunction. Florinef and BB have been continued.    She comes today for follow up and admits to some sharp chest pain after she had her expanders put in for breast cancer, but this has improved. CP is now  denied. She reports right breast cancer with bilateral mastectomy and reconstruction since last visit, followed by oncology. Labs with PCP, checked 2023. Refills requested.                 Cardiac History  Past Surgical History:   Procedure Laterality Date    ABLATION OF DYSRHYTHMIC FOCUS  2021    [facility]    BREAST AUGMENTATION Bilateral 2023    BREAST BIOPSY  Oct 2022    Need surgery right    BREAST LUMPECTOMY Right 2023    Procedure: Right breast wire localized excisional biopsy;  Surgeon: Dasia Marroquin MD;  Location: [facility], [place], operated by [facility];  Service: General;  Laterality: Right;    BREAST RECONSTRUCTION Bilateral 2023    Procedure: BILATERAL IMMEDIATE BREAST  RECONSTRUCTION  WITH TISSUE EXPANDERS AND BIOLOGIC;  Surgeon: Georgia Baer MD;  Location: [facility] OR;  Service: Plastics;  Laterality: Bilateral;    BREAST SURGERY  23    Found cancer    CARDIAC CATHETERIZATION  2021    CARDIOVASCULAR STRESS TEST  2018    R.Stress- 6 Min. 46 secs. 7.0 METS. 76% THR. BP- 121/73. Pt had CP. Inconclusive test    CARDIOVASCULAR STRESS TEST  10/17/2018    L. Cardiolite- Negative.    CARDIOVASCULAR STRESS TEST  2022    Lexiscan- EF > 70%. Negative     SECTION      X 3    CHOLECYSTECTOMY      CONVERTED (HISTORICAL) HOLTER  2017    @[facility].AVG HR 98 BPM    CONVERTED (HISTORICAL) HOLTER  2021    <8 Days. .. 1 run of SVT    CONVERTED (HISTORICAL) HOLTER  2021    Baseline sinus tach, AVG , current advised, follow with EP    ECHO - CONVERTED  2017    @[facility]. EF 60%. Mild AI    ECHO - CONVERTED  2017    EF 60%. MIld- Mod AI. No Shunt    ECHO - CONVERTED  2018    EF 55%. Mild AI & MR. RVSP- 26 mmHg.    ECHO - CONVERTED  2020    @ [facility]. EF 60%. Mild MR & AI. No AS. RVSP- 26 mmHg    FOOT SURGERY Right     HAND SURGERY Right     KNEE ACL RECONSTRUCTION Left     LAPAROSCOPIC TUBAL LIGATION      MASTECTOMY W/ SENTINEL  NODE BIOPSY Right 03/29/2023    Procedure: bilateral skin sparing mastectomy, right sentinel lymph node biopsy;  Surgeon: Dasia Marroquin MD;  Location: [facility];  Service: General;  Laterality: Right;    OTHER SURGICAL HISTORY  07/10/2017    Extended Monitor- AVG HR 81 BPM. [place] an Syncope with no EKG changes    OTHER SURGICAL HISTORY  09/18/2018    Tilt Table- Positive.    OTHER SURGICAL HISTORY  05/09/2019    MCOT:  High , low 51, AVG 82, sinus noted, rare PVC, no arrythymia    RIGHT OOPHORECTOMY      STAPEDECTOMY      US CAROTID UNILATERAL  04/09/2019    Jensen:  No flow limiting stenosis bilaterally       Current Outpatient Medications   Medication Sig Dispense Refill    ALBUTEROL SULFATE HFA IN Inhale Every 4 (Four) Hours As Needed.      bisoprolol (ZEBeta) 5 MG tablet Take 0.5 tablets by mouth Every Night. 1/2 to 1 tablet daily 90 tablet 3    butalbital-acetaminophen-caffeine (FIORICET, ESGIC) -40 MG per tablet Take 1 tablet by mouth Every 4 (Four) Hours As Needed for Headache.      Cyanocobalamin (Vitamin B 12) 500 MCG tablet Take 1 tablet by mouth Daily. HELD FOR SURGERY      cyclobenzaprine (FLEXERIL) 10 MG tablet Take 2 tablets by mouth Daily. 2 tablets daily      diazePAM (VALIUM) 2 MG tablet Take 1 tablet by mouth 3 (Three) Times a Day.      diclofenac (VOLTAREN) 50 MG EC tablet Take 1 tablet by mouth 2 (Two) Times a Day. HOLD PER MD INSTRUCTIONS PRIOR TO SURGERY      Diclofenac Sodium (VOLTAREN) 1 % gel gel Apply 4 g topically to the appropriate area as directed Daily.      dilTIAZem SR (CARDIZEM SR) 60 MG 12 hr capsule 1 tablet every day with extra tablet if needed for palpitations 180 capsule 2    Erenumab-aooe (AIMOVIG, 140 MG DOSE, SC) Inject 1 dose under the skin into the appropriate area as directed Every 30 (Thirty) Days.      fludrocortisone 0.1 MG tablet Take 2 tablets by mouth Every Morning. 90 tablet 3    fluticasone (FLONASE) 50 MCG/ACT nasal spray 2 sprays into the  nostril(s) as directed by provider Every Night.      linaclotide (LINZESS) 290 MCG capsule capsule Take 1 capsule by mouth Every Morning Before Breakfast.      Loratadine 10 MG capsule Take 1 capsule by mouth Daily.      Magnesium 125 MG capsule Take  by mouth.      methocarbamol (ROBAXIN) 750 MG tablet Take 1 tablet by mouth Every 4 (Four) Hours.      metoclopramide (REGLAN) 10 MG tablet Take 1 tablet by mouth 2 (Two) Times a Day Before Meals.      NON FORMULARY Epidural-Bupivicane-lidocaine injections every 3 months      omeprazole (priLOSEC) 40 MG capsule Take 1 capsule by mouth Daily.      OnabotulinumtoxinA (BOTOX IJ) Inject  as directed Every 3 (Three) Months.      sertraline (ZOLOFT) 100 MG tablet Take 1 tablet by mouth 2 (Two) Times a Day.      Spiriva Respimat 1.25 MCG/ACT aerosol solution inhaler Inhale 2 puffs Daily.      topiramate (TOPAMAX) 50 MG tablet Take 1.5 tablets by mouth 2 (Two) Times a Day. 1 1/2 tablets twice a day      traZODone (DESYREL) 50 MG tablet Take 1 tablet by mouth Every Night.       No current facility-administered medications for this visit.       Carbamates, Carisoprodol, and Triptans    Past Medical History:   Diagnosis Date    Abnormal ECG May 2016    Anxiety     Asthma     Breast atypical hyperplasia     Breast cancer 1/2023    Cholelithiasis 8/2022    Pain on my right side    Chronic kidney disease     hx of kidney stones    Coronary artery disease May 2026    POTS    DDD (degenerative disc disease), lumbar     Depression     Ductal carcinoma in situ (DCIS) of right breast 02/28/2023    GERD (gastroesophageal reflux disease)     History of ear surgery     Hx of hysterectomy 06/12/2018    Hypertension     Hypoglycemia     Migraines     Monoclonal (M) protein disease, 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    PONV (postoperative nausea and vomiting) 2006    Torn acl repair work up from surgery very nauseous    POTS (postural orthostatic tachycardia syndrome)     Rectal bleeding 10/2021     Due to constipation    S/P ACL surgery     S/P removal of ovarian cyst     Tattoos        Social History     Socioeconomic History    Marital status:      Spouse name: Darien    Number of children: 5   Tobacco Use    Smoking status: Never    Smokeless tobacco: Never   Vaping Use    Vaping Use: Never used   Substance and Sexual Activity    Alcohol use: Yes     Alcohol/week: 2.0 standard drinks of alcohol     Types: 2 Drinks containing 0.5 oz of alcohol per week     Comment: Maybe once a month if we go out to dinner    Drug use: No    Sexual activity: Yes     Partners: Male     Birth control/protection: None, Tubal ligation, Hysterectomy     Comment: 2016 Hysterectomy was done leaving one overy left       Family History   Problem Relation Age of Onset    Diabetes Mother     Arthritis Father     Asthma Father         Covid related    Breast cancer Sister     Cancer Sister         Liver 2020 passed 2022    Early death Sister         Liver cancer  2022    No Known Problems Sister     Cancer Sister         Breast right     Miscarriages / Stillbirths Daughter         Miscarriage/ stillbirth    Heart disease Son         Caused by Kawasaki disease    Heart attack Son     Autism Son     Learning disabilities Son         Autism    Miscarriages / Stillbirths Son         Misscarriage/ stillbirth    Heart attack Paternal Grandfather         Heart Attack    Heart disease Paternal Grandfather     Malig Hyperthermia Neg Hx        Review of Systems   Constitutional: Negative for malaise/fatigue and night sweats.   Cardiovascular:  Negative for chest pain, claudication, dyspnea on exertion, irregular heartbeat, leg swelling, near-syncope, orthopnea, palpitations and syncope.   Respiratory:  Negative for cough, shortness of breath and wheezing.    Musculoskeletal:  Positive for stiffness. Negative for back pain and joint pain.   Gastrointestinal:  Negative for anorexia, heartburn, nausea and vomiting.  "  Genitourinary:  Negative for dysuria, hematuria, hesitancy and nocturia.   Neurological:  Negative for dizziness, headaches and light-headedness.   Psychiatric/Behavioral:  Negative for depression and memory loss. The patient is not nervous/anxious.            Objective     /70 (BP Location: Left arm, Patient Position: Sitting)   Pulse 92   Ht 162.6 cm (64\")   Wt 77.9 kg (171 lb 12.8 oz)   BMI 29.49 kg/m²     Constitutional:       Appearance: Not in distress.   Eyes:      Pupils: Pupils are equal, round, and reactive to light.   HENT:      Nose: Nose normal.   Pulmonary:      Effort: Pulmonary effort is normal.      Breath sounds: Normal breath sounds.   Cardiovascular:      PMI at left midclavicular line. Normal rate. Regular rhythm.      Murmurs: There is a systolic murmur.   Abdominal:      Palpations: Abdomen is soft.   Musculoskeletal: Normal range of motion.      Cervical back: Normal range of motion and neck supple. Skin:     General: Skin is warm and dry.   Neurological:      Mental Status: Alert.         Procedures         Diagnoses and all orders for this visit:    1. POTS (postural orthostatic tachycardia syndrome) (Primary)    2. Tachycardia  -     dilTIAZem SR (CARDIZEM SR) 60 MG 12 hr capsule; 1 tablet every day with extra tablet if needed for palpitations  Dispense: 180 capsule; Refill: 2    3. Palpitations  -     dilTIAZem SR (CARDIZEM SR) 60 MG 12 hr capsule; 1 tablet every day with extra tablet if needed for palpitations  Dispense: 180 capsule; Refill: 2    4. Dizziness  -     dilTIAZem SR (CARDIZEM SR) 60 MG 12 hr capsule; 1 tablet every day with extra tablet if needed for palpitations  Dispense: 180 capsule; Refill: 2    5. Tachy-esmaj syndrome  -     dilTIAZem SR (CARDIZEM SR) 60 MG 12 hr capsule; 1 tablet every day with extra tablet if needed for palpitations  Dispense: 180 capsule; Refill: 2    6. PSVT (paroxysmal supraventricular tachycardia)    7. Breast neoplasm, Tis (DCIS), " right    8. Overweight    Other orders  -     bisoprolol (ZEBeta) 5 MG tablet; Take 0.5 tablets by mouth Every Night. 1/2 to 1 tablet daily  Dispense: 90 tablet; Refill: 3               Palpitations/Tachycardia/POTS:  Reported today, similar to prior. Tachycardia noted. She brings log that shows elevation as high as 140. BP not too low. She does report Tomsofioni changed her cardizem to lower dosing? Than she was on prior. She was urged to begin her cardizem back at 60mg in AM with extra 30mg in PM. Limited caffeine advised as well as adequate fluid intake. Most recent holter report reviewed with patient with sinus tach/only 6 beat run of PSVT seen.      Chest discomfort:  Noted with palpitations. She also admits to RUQ pain, being evaluated by your office, has a gallbladder study soon. Repeat stress advised. Will be done as lexiscan as she admits too much fatigue, SOA, and inability to do much activity due to joint pain and fatigue. This will be done to rule out ischemic burden. More recommendations to follow.      Anxiety:  Using valium and zoloft in regards.      Labs including TSH recently done with your office. Can we have for review?       BMI noted at 34.33, good cardiac diet with limited caffeine intake advised. Adequate fluid intake advised.      Refills per request.     6 month follow up advised or sooner if needed.            Problems Addressed this Visit          Cardiac and Vasculature    Tachycardia    Relevant Medications    dilTIAZem SR (CARDIZEM SR) 60 MG 12 hr capsule    POTS (postural orthostatic tachycardia syndrome) - Primary    Palpitations    Relevant Medications    dilTIAZem SR (CARDIZEM SR) 60 MG 12 hr capsule    PSVT (paroxysmal supraventricular tachycardia)    Relevant Medications    bisoprolol (ZEBeta) 5 MG tablet    dilTIAZem SR (CARDIZEM SR) 60 MG 12 hr capsule       Hematology and Neoplasia    Breast neoplasm, Tis (DCIS), right       Symptoms and Signs    Dizziness    Relevant  Medications    dilTIAZem SR (CARDIZEM SR) 60 MG 12 hr capsule     Other Visit Diagnoses       Tachy-semaj syndrome        Relevant Medications    bisoprolol (ZEBeta) 5 MG tablet    dilTIAZem SR (CARDIZEM SR) 60 MG 12 hr capsule    Overweight              Diagnoses         Codes Comments    POTS (postural orthostatic tachycardia syndrome)    -  Primary ICD-10-CM: G90.A  ICD-9-CM: 427.89     Tachycardia     ICD-10-CM: R00.0  ICD-9-CM: 785.0     Palpitations     ICD-10-CM: R00.2  ICD-9-CM: 785.1     Dizziness     ICD-10-CM: R42  ICD-9-CM: 780.4     Tachy-semaj syndrome     ICD-10-CM: I49.5  ICD-9-CM: 427.81     PSVT (paroxysmal supraventricular tachycardia)     ICD-10-CM: I47.10  ICD-9-CM: 427.0     Breast neoplasm, Tis (DCIS), right     ICD-10-CM: D05.11  ICD-9-CM: 233.0     Overweight     ICD-10-CM: E66.3  ICD-9-CM: 278.02                      Electronically signed by Clarisa Stephens, APRN October 25, 2023 16:02 EDT

## 2023-11-09 ENCOUNTER — TELEPHONE (OUTPATIENT)
Dept: SURGERY | Facility: CLINIC | Age: 48
End: 2023-11-09
Payer: OTHER GOVERNMENT

## 2023-11-09 NOTE — TELEPHONE ENCOUNTER
Patient calling wanting a referral to the lymphedema clinic at [facility] for Inna Vazquez. Is this okay to go ahead and place the referral?

## 2023-11-22 ENCOUNTER — HOSPITAL ENCOUNTER (OUTPATIENT)
Dept: PHYSICAL THERAPY | Facility: HOSPITAL | Age: 48
Setting detail: THERAPIES SERIES
Discharge: HOME | End: 2023-11-22
Payer: OTHER GOVERNMENT

## 2023-11-22 DIAGNOSIS — M25.612 DECREASED ROM OF LEFT SHOULDER: ICD-10-CM

## 2023-11-22 DIAGNOSIS — Z90.13 STATUS POST BILATERAL MASTECTOMY: Primary | ICD-10-CM

## 2023-11-22 DIAGNOSIS — Z91.89 AT RISK FOR LYMPHEDEMA: ICD-10-CM

## 2023-11-22 PROCEDURE — 97535 SELF CARE MNGMENT TRAINING: CPT

## 2023-11-22 PROCEDURE — 97164 PT RE-EVAL EST PLAN CARE: CPT

## 2023-11-22 PROCEDURE — 93702 BIS XTRACELL FLUID ANALYSIS: CPT

## 2023-11-22 NOTE — THERAPY RE-EVALUATION
Physical Therapy Lymphedema Re-Evaluation  [facility]     Patient Name: Dolores Dejesus  : 1975  MRN: [id number]  Today's Date: 2023      Visit Date: 2023    Visit Dx:    ICD-10-CM ICD-9-CM   1. Status post bilateral mastectomy  Z90.13 V45.71   2. At risk for lymphedema  Z91.89 V49.89   3. Decreased ROM of left shoulder  M25.612 719.51       Patient Active Problem List   Diagnosis    Near syncope    Dizziness    Migraine aura without headache    Tachycardia    Hx of migraine headaches    POTS (postural orthostatic tachycardia syndrome)    Palpitations    Metabolic syndrome    PSVT (paroxysmal supraventricular tachycardia)    History of cardiac radiofrequency ablation (RFA)    Atypical ductal hyperplasia of breast, bilateral    Breast neoplasm, Tis (DCIS), right        Past Medical History:   Diagnosis Date    Abnormal ECG May 2016    Anxiety     Asthma     Breast atypical hyperplasia     Breast cancer 2023    Cholelithiasis 2022    Pain on my right side    Chronic kidney disease     hx of kidney stones    Coronary artery disease May 2026    POTS    DDD (degenerative disc disease), lumbar     Depression     Ductal carcinoma in situ (DCIS) of right breast 2023    GERD (gastroesophageal reflux disease)     History of ear surgery     Hx of hysterectomy 2018    Hypertension     Hypoglycemia     Migraines     Monoclonal (M) protein disease, 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    PONV (postoperative nausea and vomiting) 2006    Torn acl repair work up from surgery very nauseous    POTS (postural orthostatic tachycardia syndrome)     Rectal bleeding 10/2021    Due to constipation    S/P ACL surgery     S/P removal of ovarian cyst     Tattoos         Past Surgical History:   Procedure Laterality Date    ABLATION OF DYSRHYTHMIC FOCUS  2021    [facility]    BREAST AUGMENTATION Bilateral 2023    BREAST BIOPSY  Oct 2022    Need surgery right    BREAST LUMPECTOMY Right 2023     Procedure: Right breast wire localized excisional biopsy;  Surgeon: Dasia Marroquin MD;  Location: [facility];  Service: General;  Laterality: Right;    BREAST RECONSTRUCTION Bilateral 2023    Procedure: BILATERAL IMMEDIATE BREAST  RECONSTRUCTION  WITH TISSUE EXPANDERS AND BIOLOGIC;  Surgeon: Georgia Baer MD;  Location: [facility];  Service: Plastics;  Laterality: Bilateral;    BREAST SURGERY  23    Found cancer    CARDIAC CATHETERIZATION  2021    CARDIOVASCULAR STRESS TEST  2018    R.Stress- 6 Min. 46 secs. 7.0 METS. 76% THR. BP- 121/73. Pt had CP. Inconclusive test    CARDIOVASCULAR STRESS TEST  10/17/2018    L. Cardiolite- Negative.    CARDIOVASCULAR STRESS TEST  2022    Lexiscan- EF > 70%. Negative     SECTION      X 3    CHOLECYSTECTOMY      CONVERTED (HISTORICAL) HOLTER  2017    @[facility].AVG HR 98 BPM    CONVERTED (HISTORICAL) HOLTER  2021    <8 Days. .. 1 run of SVT    CONVERTED (HISTORICAL) HOLTER  2021    Baseline sinus tach, AVG , current advised, follow with EP    ECHO - CONVERTED  2017    @RS. EF 60%. Mild AI    ECHO - CONVERTED  2017    EF 60%. MIld- Mod AI. No Shunt    ECHO - CONVERTED  2018    EF 55%. Mild AI & MR. RVSP- 26 mmHg.    ECHO - CONVERTED  2020    @ RS. EF 60%. Mild MR & AI. No AS. RVSP- 26 mmHg    FOOT SURGERY Right     HAND SURGERY Right     KNEE ACL RECONSTRUCTION Left     LAPAROSCOPIC TUBAL LIGATION      MASTECTOMY W/ SENTINEL NODE BIOPSY Right 2023    Procedure: bilateral skin sparing mastectomy, right sentinel lymph node biopsy;  Surgeon: Dasia Marroquin MD;  Location: [facility];  Service: General;  Laterality: Right;    OTHER SURGICAL HISTORY  07/10/2017    Extended Monitor- AVG HR 81 BPM. [place] an Syncope with no EKG changes    OTHER SURGICAL HISTORY  2018    Tilt Table- Positive.    OTHER SURGICAL HISTORY  2019    MCOT:  High , low 51, AVG 82,  sinus noted, rare PVC, no arrythymia    RIGHT OOPHORECTOMY      STAPEDECTOMY      US CAROTID UNILATERAL  04/09/2019    Jensen:  No flow limiting stenosis bilaterally       Visit Dx:    ICD-10-CM ICD-9-CM   1. Status post bilateral mastectomy  Z90.13 V45.71   2. At risk for lymphedema  Z91.89 V49.89   3. Decreased ROM of left shoulder  M25.612 719.51            Lymphedema       Row Name 11/22/23 1400             Subjective Pain    Able to rate subjective pain? yes  -LB      Pre-Treatment Pain Level 2  -LB         Subjective    Subjective Comments I am doing ok. I am in PT for my L shoulder bc my ROM is very limited. My L breast is tight and higher than my R. The expander burst so the implant went in while it was more flat than the R. I have been wearing my sleeve intermittently. No issues with it.  -LB         Lymphedema Assessment    Lymphedema Classification RUE:;at risk/stage 0  -LB      Lymphedema Cancer Related Sx bilateral;modified radical mastectomy;right;sentinel node biopsy  -LB      Lymphedema Surgery Comments 3/29/23  -LB      Lymph Nodes Removed # 2  -LB      Positive Lymph Nodes # 0  -LB      Chemo Received no  -LB      Radiation Therapy Received no  -LB      Infections or Cellulitis? no  -LB         Posture/Observations    Alignment Options Forward head;Rounded shoulders  -LB      Forward Head Moderate  -LB      Rounded Shoulders Bilateral:;Moderate  -LB         General ROM    GENERAL ROM COMMENTS L shoulder grossly 90 deg abduction/100 deg flexion, RUE WFL  -LB         Lymphedema Edema Assessment    Edema Assessment Comment L breast with slight edema, larger than R, axillary edema B  -LB         Skin Changes/Observations    Skin Observations Comment well healing incisions  -LB         Lymphedema Measurements    Measurement Type(s) Quick Girth  -LB      Quick Girth Areas Upper extremities  -LB         LUE Quick Girth (cm)    Axilla 34 cm  -LB      Mid upper arm 31.7 cm  -LB      Elbow 24 cm  -LB      Mid  forearm 20.4 cm  -LB      Wrist crease 14.5 cm  -LB      Web space 18.2 cm  -LB      Met-heads 17.9 cm  -LB      LUE Quick Girth Total 160.7  -LB         RUE Quick Girth (cm)    Axilla 36 cm  -LB      Mid upper arm 30.5 cm  -LB      Elbow 23.5 cm  -LB      Mid forearm 20.9 cm  -LB      Wrist crease 15.4 cm  -LB      Web space 18.5 cm  -LB      Met-heads 18 cm  -LB      RUE Quick Girth Total 162.8  -LB         L-Dex Bioimpedence Screening    L-Dex Measurement Extremity RUE  -LB      L-Dex Patient Position Standing  -LB      L-Dex UE Dominate Side Right  -LB      L-Dex UE At Risk Side Right  -LB      L-Dex UE Pre Surgical Value No  -LB      L-Dex UE Score -1.9  -LB      L-Dex UE Baseline Score 3.9  -LB      L-Dex UE Value Change -5.8  -LB      $ L-Dex Charge yes  -LB                User Key  (r) = Recorded By, (t) = Taken By, (c) = Cosigned By      Initials Name Provider Type    Jennifer Mosquera, PT Physical Therapist                                    Therapy Education  Education Details: discussed bioimpedance score and results, management of L breast including swell spot, self MLD, use of compression sleeve for RUE, bioimpedance protocol  Given: Symptoms/condition management, Edema management  Program: Reinforced  How Provided: Verbal, Demonstration  Provided to: Patient  Level of Understanding: Verbalized  33660 - PT Self Care/Mgmt Minutes: 15       OP Exercises       Row Name 11/22/23 1400             Subjective    Subjective Comments I am doing ok. I am in PT for my L shoulder bc my ROM is very limited. My L breast is tight and higher than my R. The expander burst so the implant went in while it was more flat than the R. I have been wearing my sleeve intermittently. No issues with it.  -LB         Subjective Pain    Able to rate subjective pain? yes  -LB      Pre-Treatment Pain Level 2  -LB                User Key  (r) = Recorded By, (t) = Taken By, (c) = Cosigned By      Initials Name Provider Type    CHLOE Toro  Jennifer, ELISHA Physical Therapist                                 PT OP Goals       Row Name 11/22/23 1400          Long Term Goals    LTG Date to Achieve 07/13/23  -LB     LTG 1 Patient will be independent in comprehensive HEP to allow ongoing self management.  -LB     LTG 1 Progress Met  -LB     LTG 1 Progress Comments Pt going to outpatient PT for shoulder.  -LB     LTG 2 Patient will acquire appropriately fitted compression garment and verbalize appropriate wear schedule.  -LB     LTG 2 Progress Met  -LB     LTG 3 Patient will demonstrate full painfree AROM B UE.  -LB     LTG 3 Progress Partially Met  -LB     LTG 4 Patient will maintain LDex bioimpedance WNL.  -LB     LTG 4 Progress Ongoing  -LB     LTG 4 Progress Comments WNL and stable today at -1.9  -LB     LTG 5 Patient will improve QuickDash score to 20% or less.  -LB     LTG 5 Progress Ongoing  -LB     LTG 5 Progress Comments 34% today  -LB        Time Calculation    PT Goal Re-Cert Due Date 02/20/24  -LB               User Key  (r) = Recorded By, (t) = Taken By, (c) = Cosigned By      Initials Name Provider Type    Jennifer Mosquera, ELISHA Physical Therapist                     PT Assessment/Plan       Row Name 11/22/23 1442          PT Assessment    Functional Limitations Limitation in home management;Limitations in community activities;Limitations in functional capacity and performance;Performance in self-care ADL  -LB     Impairments Edema;Impaired lymphatic circulation;Impaired postural alignment;Muscle strength;Pain;Posture;Range of motion  -LB     Assessment Comments Pt returns for re-evaluation reporting continued difficulty with LUE demonstrating grossly 90 deg AROM abduction. She is currently being seen at outpt PT closer to her home for L shoulder limitations. L breast with larger size and slight edema compared to R breast. No s/s of lymphedema noted today in L breast or RUE. She states she is compliant with intermittent use of RUE compression sleeve  and reports good fit. We repeated LDex bioimpedance testing today with stable score WNL at -1.9, reduced from baseline. We discussed options for L breast edema such as swell spot and self MLD. Pt has met 2/5 goals. Her self reported QuickDASH disability score has improved to 34% disability. She remains appropriate for continued skilled PT services to address deficits as needed and continue to monitor bioimpedance. Due to 2 hr drive to reach clinic here she will come in town for bioimpedance and continue otherwise managing LUE at local clinic.  -LB     Rehab Potential Good  -LB     Patient/caregiver participated in establishment of treatment plan and goals Yes  -LB     Patient would benefit from skilled therapy intervention Yes  -LB        PT Plan    PT Frequency Other (comment)  -LB     Predicted Duration of Therapy Intervention (PT) repeat bioimpedance in 3 months  -LB     Planned CPT's? PT EVAL LOW COMPLEXITY: 70873;PT RE-EVAL: 22730;PT THER PROC EA 15 MIN: 28555;PT THER ACT EA 15 MIN: 45054;PT MANUAL THERAPY EA 15 MIN: 26976;PT NEUROMUSC RE-EDUCATION EA 15 MIN: 09217;PT SELF CARE/HOME MGMT/TRAIN EA 15: 29324;PT BIS XTRACELL FLUID ANALYSIS: 42082  -LB     PT Plan Comments repeat bioimpedance, success with L breast edema  -LB               User Key  (r) = Recorded By, (t) = Taken By, (c) = Cosigned By      Initials Name Provider Type    Jennifer Mosquera, PT Physical Therapist                       Outcome Measure Options: Quick DASH  Quick DASH  Open a tight or new jar.: Mild Difficulty  Do heavy household chores (e.g., wash walls, wash floors): Moderate Difficulty  Carry a shopping bag or briefcase: Mild Difficulty  Wash your back: Mild Difficulty  Use a knife to cut food: Mild Difficulty  Recreational activities in which you take some force or impact through your arm, should or hand (e.g. golf, hammering, tennis, etc.): Moderate Difficulty  During the past week, to what extent has your arm, shoulder, or hand  problem interfered with your normal social activites with family, friends, neighbors or groups?: Slightly  During the past week, were you limited in your work or other regular daily activities as a result of your arm, shoulder or hand problem?: Slightly Limited  Arm, Shoulder, or hand pain: Moderate  Tingling (pins and needles) in your arm, shoulder, or hand: Moderate  During the past week, how much difficulty have you had sleeping because of the pain in your arm, shoulder or hand?: Mild Difficulty  Number of Questions Answered: 11  Quick DASH Score: 34.09         Time Calculation:   Start Time: 1400  Stop Time: 1445  Time Calculation (min): 45 min  Total Timed Code Minutes- PT: 15 minute(s)  Timed Charges  82714 - PT Self Care/Mgmt Minutes: 15  Total Minutes  Timed Charges Total Minutes: 15   Total Minutes: 15   Therapy Charges for Today       Code Description Service Date Service Provider Modifiers Qty    24743710655 HC PT BIS XTRACELL FLUID ANALYSIS 11/22/2023 Jennifer Toro, PT  1    76880161895 HC PT SELF CARE/MGMT/TRAIN EA 15 MIN 11/22/2023 Jennifer Toro, PT GP 1    37345186524 HC PT RE-EVAL ESTABLISHED PLAN 2 11/22/2023 Jennifer Toro, PT GP 1            PT G-Codes  Outcome Measure Options: Quick DASH  Quick DASH Score: 34.09         Jennifer Toro PT  11/22/2023

## 2024-01-29 DIAGNOSIS — I49.5 TACHY-BRADY SYNDROME: ICD-10-CM

## 2024-01-29 DIAGNOSIS — R42 DIZZINESS: ICD-10-CM

## 2024-01-29 DIAGNOSIS — R00.0 TACHYCARDIA: ICD-10-CM

## 2024-01-29 DIAGNOSIS — R00.2 PALPITATIONS: ICD-10-CM

## 2024-01-29 RX ORDER — DILTIAZEM HYDROCHLORIDE 60 MG/1
CAPSULE, EXTENDED RELEASE ORAL
Qty: 180 CAPSULE | Refills: 3 | Status: SHIPPED | OUTPATIENT
Start: 2024-01-29

## 2024-01-29 NOTE — TELEPHONE ENCOUNTER
Received fax for refills on diltiazem ER 60 mg BID to be sent to [facility] 180 tablets and 3 refills.

## 2024-02-14 ENCOUNTER — HOSPITAL ENCOUNTER (OUTPATIENT)
Dept: PHYSICAL THERAPY | Facility: HOSPITAL | Age: 49
Setting detail: THERAPIES SERIES
Discharge: HOME | End: 2024-02-14
Payer: OTHER GOVERNMENT

## 2024-02-14 DIAGNOSIS — Z91.89 AT RISK FOR LYMPHEDEMA: ICD-10-CM

## 2024-02-14 DIAGNOSIS — M25.611 DECREASED ROM OF RIGHT SHOULDER: ICD-10-CM

## 2024-02-14 DIAGNOSIS — Z90.13 STATUS POST BILATERAL MASTECTOMY: Primary | ICD-10-CM

## 2024-02-14 DIAGNOSIS — M25.612 DECREASED ROM OF LEFT SHOULDER: ICD-10-CM

## 2024-02-14 PROCEDURE — 97535 SELF CARE MNGMENT TRAINING: CPT

## 2024-02-14 PROCEDURE — 93702 BIS XTRACELL FLUID ANALYSIS: CPT

## 2024-03-20 ENCOUNTER — OFFICE VISIT (OUTPATIENT)
Dept: CARDIOLOGY | Facility: CLINIC | Age: 49
End: 2024-03-20
Payer: OTHER GOVERNMENT

## 2024-03-20 VITALS
BODY MASS INDEX: 30.97 KG/M2 | HEART RATE: 75 BPM | SYSTOLIC BLOOD PRESSURE: 126 MMHG | OXYGEN SATURATION: 95 % | DIASTOLIC BLOOD PRESSURE: 90 MMHG | HEIGHT: 64 IN | WEIGHT: 181.4 LBS

## 2024-03-20 DIAGNOSIS — G90.A POTS (POSTURAL ORTHOSTATIC TACHYCARDIA SYNDROME): Primary | ICD-10-CM

## 2024-03-20 DIAGNOSIS — I47.10 PSVT (PAROXYSMAL SUPRAVENTRICULAR TACHYCARDIA): ICD-10-CM

## 2024-03-20 PROCEDURE — 99213 OFFICE O/P EST LOW 20 MIN: CPT | Performed by: INTERNAL MEDICINE

## 2024-03-20 PROCEDURE — 93000 ELECTROCARDIOGRAM COMPLETE: CPT | Performed by: INTERNAL MEDICINE

## 2024-03-20 NOTE — PROGRESS NOTES
Dolores Dejesus  1975  [phone]    03/20/2024    [facility] CARDIOLOGY     Referring Provider: No ref. provider found     Adin Lundy APRN  [address]  [facility] [address]    Chief Complaint   Patient presents with    POTS (postural orthostatic tachycardia syndrome)     6-Mo F/U       Problem List:   Syncope/presyncope/POTS:   Failed metoprolol and midodrine  Stress test 8/21/2018: Patient walked 6 minutes 46 seconds, rate 76% of target heart rate, patient had chest pain-inconclusive test  Cardiolite stress test 10/17/2018: Negative for ischemia  Echocardiogram 8/21/2018 EF 55% mild AI and mild MR RVSP 26 mmHg  Positive tilt table test 9/18/2018  MCOT 5/9/2019 sinus rhythm 51 to 147 bpm, average 82 bpm  Event Monitor 8/5- 8/13/2021: ST and NSR   Event Monitor 1/25/2022: EF 70%, no ischemia, low risk study.   Lexiscan 1/25/2022 LVEF 70%, no ischemia  SVT  SVT requiring Adenosine while in [place] 1/2021  EPS + RFA of SVT 2/25/2021   Dysfunctional uterine bleeding status post hysterectomy  Depression on Zoloft  Anxiety  Chronic headaches  Gastroparesis   Surgical history  Ear surgery  Hysterectomy  ACL surgery  Removal of ovarian cyst  Cholecystectomy 1/2022   Allergies  Allergies   Allergen Reactions    Other Anaphylaxis and Hives     Soma    Carbamates Hives    Carisoprodol Hives    Triptans Hives       Current Medications    Current Outpatient Medications:     ALBUTEROL SULFATE HFA IN, Inhale Every 4 (Four) Hours As Needed., Disp: , Rfl:     bisoprolol (ZEBeta) 5 MG tablet, Take 0.5 tablets by mouth Every Night. 1/2 to 1 tablet daily, Disp: 90 tablet, Rfl: 3    butalbital-acetaminophen-caffeine (FIORICET, ESGIC) -40 MG per tablet, Take 1 tablet by mouth Every 4 (Four) Hours As Needed for Headache., Disp: , Rfl:     Cyanocobalamin (Vitamin B 12) 500 MCG tablet, Take 1 tablet by mouth Daily. HELD FOR SURGERY, Disp: , Rfl:     cyclobenzaprine (FLEXERIL) 10 MG tablet, Take  2 tablets by mouth Daily. 2 tablets daily, Disp: , Rfl:     diazePAM (VALIUM) 2 MG tablet, Take 1 tablet by mouth 3 (Three) Times a Day., Disp: , Rfl:     diclofenac (VOLTAREN) 50 MG EC tablet, Take 1 tablet by mouth 2 (Two) Times a Day. HOLD PER MD INSTRUCTIONS PRIOR TO SURGERY, Disp: , Rfl:     Diclofenac Sodium (VOLTAREN) 1 % gel gel, Apply 4 g topically to the appropriate area as directed Daily., Disp: , Rfl:     dilTIAZem SR (CARDIZEM SR) 60 MG 12 hr capsule, Take 1 tablet by mouth every day and can take 1 extra tablet daily as needed for palpitations, Disp: 180 capsule, Rfl: 3    Erenumab-aooe (AIMOVIG, 140 MG DOSE, SC), Inject 1 dose under the skin into the appropriate area as directed Every 30 (Thirty) Days., Disp: , Rfl:     fludrocortisone 0.1 MG tablet, Take 2 tablets by mouth Every Morning., Disp: 90 tablet, Rfl: 3    fluticasone (FLONASE) 50 MCG/ACT nasal spray, 2 sprays into the nostril(s) as directed by provider Every Night., Disp: , Rfl:     linaclotide (LINZESS) 290 MCG capsule capsule, Take 1 capsule by mouth Every Morning Before Breakfast., Disp: , Rfl:     Loratadine 10 MG capsule, Take 1 capsule by mouth Daily., Disp: , Rfl:     Magnesium 125 MG capsule, Take  by mouth., Disp: , Rfl:     methocarbamol (ROBAXIN) 750 MG tablet, Take 1 tablet by mouth Every 4 (Four) Hours., Disp: , Rfl:     metoclopramide (REGLAN) 10 MG tablet, Take 1 tablet by mouth 2 (Two) Times a Day Before Meals., Disp: , Rfl:     NON FORMULARY, Epidural-Bupivicane-lidocaine injections every 3 months, Disp: , Rfl:     omeprazole (priLOSEC) 40 MG capsule, Take 1 capsule by mouth Daily., Disp: , Rfl:     OnabotulinumtoxinA (BOTOX IJ), Inject  as directed Every 3 (Three) Months., Disp: , Rfl:     sertraline (ZOLOFT) 100 MG tablet, Take 1 tablet by mouth 2 (Two) Times a Day., Disp: , Rfl:     Spiriva Respimat 1.25 MCG/ACT aerosol solution inhaler, Inhale 2 puffs Daily., Disp: , Rfl:     topiramate (TOPAMAX) 50 MG tablet, Take 1.5  "tablets by mouth 2 (Two) Times a Day. 1 1/2 tablets twice a day, Disp: , Rfl:     traZODone (DESYREL) 50 MG tablet, Take 1 tablet by mouth Every Night., Disp: , Rfl:     History of Present Illness     Pt presents for follow up of POTS/SVT . Since we last saw the pt, pt was unfortunately unable to get Northera due to her insurance denying the medication.  Instead her Florinef was increased to 0.2 mg a day.  Since then she has done reasonably well.  She has had no diego syncope although she does develop episodes of lightheadedness on occasion and near syncope.  But for the most part she has done reasonably well.  Denies any tachycardia, SOB, CP.Denies any hospitalizations, ER visits, bleeding, or TIA/CVA symptoms.  Has been compliant with medical therapy.        Vitals:    03/20/24 1615   BP: 126/90   BP Location: Left arm   Patient Position: Sitting   Cuff Size: Adult   Pulse: 75   SpO2: 95%   Weight: 82.3 kg (181 lb 6.4 oz)   Height: 162.6 cm (64\")     Body mass index is 31.14 kg/m².  PE:  General: NAD  Neck: no JVD, no carotid bruits, no TM  Heart RRR, NL S1, S2, S4 present, no rubs, murmurs  Lungs: CTA, no wheezes, rhonchi, or rales  Abd: soft, non-tender, NL BS  Ext: No musculoskeletal deformities, no edema, cyanosis, or clubbing  Psych: normal mood and affect    Diagnostic Data:        ECG 12 Lead    Date/Time: 3/20/2024 4:20 PM  Performed by: Bucky Dee MD    Authorized by: Bucky Dee MD  Comparison: compared with previous ECG from 2/2/2023  Similar to previous ECG  Rhythm: sinus rhythm  BPM: 75            1. POTS (postural orthostatic tachycardia syndrome)    2. PSVT (paroxysmal supraventricular tachycardia)          Plan:  1. POTS: Unfortunately unable to get neurosteroid.  Doing reasonably well on current medical regimen including Florinef 0.2 mg a day.  Will order salt tablets to take as needed.     2.SVT:  Tachycardia overall doing well.  Unsure whether this is SVT versus inappropriate " sinus tachycardia.  Monitor for now.       F/up in 6 months

## 2024-03-25 ENCOUNTER — TELEPHONE (OUTPATIENT)
Dept: CARDIOLOGY | Facility: CLINIC | Age: 49
End: 2024-03-25
Payer: OTHER GOVERNMENT

## 2024-03-25 NOTE — TELEPHONE ENCOUNTER
Patient notified and aware that Dr. Dee would like her to take two salt tablets by mouth as needed for episodes of dizziness.    I called in a RX to [facility] pharmacy in [place].

## 2024-04-25 ENCOUNTER — OFFICE VISIT (OUTPATIENT)
Dept: SURGERY | Facility: CLINIC | Age: 49
End: 2024-04-25
Payer: OTHER GOVERNMENT

## 2024-04-25 VITALS
BODY MASS INDEX: 30.39 KG/M2 | HEIGHT: 64 IN | DIASTOLIC BLOOD PRESSURE: 82 MMHG | WEIGHT: 178 LBS | SYSTOLIC BLOOD PRESSURE: 125 MMHG

## 2024-04-25 DIAGNOSIS — Z09 ENCOUNTER FOR FOLLOW-UP: Primary | ICD-10-CM

## 2024-04-25 DIAGNOSIS — D05.11 DUCTAL CARCINOMA IN SITU (DCIS) OF RIGHT BREAST: ICD-10-CM

## 2024-04-25 RX ORDER — ESCITALOPRAM OXALATE 10 MG/1
TABLET ORAL
COMMUNITY

## 2024-04-25 RX ORDER — FLUCONAZOLE 200 MG/1
1 TABLET ORAL DAILY
COMMUNITY
Start: 2024-01-22

## 2024-04-25 RX ORDER — ONDANSETRON 4 MG/1
TABLET, ORALLY DISINTEGRATING ORAL
COMMUNITY
Start: 2024-02-18

## 2024-04-25 RX ORDER — KETOROLAC TROMETHAMINE 10 MG/1
10 TABLET, FILM COATED ORAL 2 TIMES DAILY PRN
COMMUNITY
Start: 2024-01-24

## 2024-04-25 NOTE — PROGRESS NOTES
Assessment/Plan:    48 y.o. 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      A multidisciplinary plan has been formulated for the patient:    (1) Breast Surgical Oncology:  - [employer] Genetic Testing 9 gene panel: negative   - Status post right breast wire localized excisional biopsy for atypical ductal hyperplasia 02/9/2023. Final pathology upstaged to DCIS.   - Status post bilateral skin sparing mastectomy with right sentinel lymph node biopsy and reconstruction by Dr. Baer 3/2023.   - Following with the lymphedema clinic.  - Follow-up in one year.     (2) Medical Oncology:  - Following with Dr. Anderson.     (3) Health Maintenance:   - She complains of hot flashes that are exacerbating her POTS. Recommend she follow up with cardiology.     Chief complaint: routine followup breast cancer    HPI:   12/6/2022 Seen by Dr. Marroquin:  Ms. Dolores Dejesus is seen at the request of No ref. provider found. The patient is a 47 year old woman being seen for a new diagnosis of right breast atypical ductal hyperplasia.    This was initially detected as an imaging abnormality on routine screening. Her work-up is detailed in the breast history section below. She has had regular annual mammogram. She denies any prior history of abnormal mammograms or biopsies. She denies any breast lumps, pain, skin changes, or nipple discharge. She has had some right breast pain over the last few months.  She has a family history of breast cancer in her sister at age 35. She denies any family history of ovarian cancer.     2/28/2023 Seen by Dr. Marroquin:  she presents today for follow-up.  She is doing well since surgery.  Her pain is controlled.  She is getting back to her daily activities.  She has an appointment with Dr. Baer next week to discuss mastectomies with reconstruction.    4/11/2023 Seen by Dr. Marroquin:  She presents today for follow up. She still has some  soreness and pain. This is not getting worse. She is slowly getting back to her daily activities. Her drain output is decreasing.     4/25/2024 She is here today for a follow-up. She denies any masses or skin changes. She states the left implant does feel tighter since she wasn't able to do full expansion with tissue expanders. She reports she has noticed significant hot flashes, she saw her GYN. She has POTS and reports this worsens when she has hot flashes. She is following with cardiology in regards to her POTS. She was told to take salt tablets.     TIMELINE OF WORKUP:  10/12/2022 Bilateral Diagnostic Mammogram with Bilateral Ultrasound:  The breasts are heterogeneously dense, which may obscure small masses.  Finding 1: There is no radiographic abnormality in the region of the pain in the right breast at 9:00.  There are no suspicious masses, calcifications, or areas of architectural distortion.  Finding 2: There is no radiographic abnormality in the region of the pain in the left breast at 6:00.  No solid or suspicious masses are seen and no findings to suggest malignancy at this time.  On ultrasound,  Finding 1: There is an oval parallel anechoic simple cyst with circumscribed margins measuring 4 mm seen in the right breast at 9:00.  Finding correlates to the pain in the right breast at 9:00.  Finding 2: There is no sonographic correlate in the area of the pain in the left breast at 6:00.  No solid or suspicious mass is seen and no findings to suggest malignancy at this time.  Finding 3: There is an oval parallel complicated cyst versus solid mass with circumscribed margins measuring 8 x 5 x 6 mm seen in the 10:30 region of the right breast located 6 cm the nipple.  This is an incidental finding.  Impression:  Finding 1: Simple cyst in the right breast at 9:00 is benign-negative.  Finding 2: Area in the left breast is benign-negative.  No mammographic or sonographic correlate to explain intermittent breast  pain.  Finding 3: Complicated cyst versus solid mass in the 10:30 region of the right breast located 6 cm the nipple is suspicious.  Ultrasound-guided biopsy is recommended.  BI-RADS Category 4A: Suspicious abnormality.    11/10/2022 Right breast ultrasound-guided biopsy:  The mass in the right breast at 10:30 was located.  5 cores were obtained.  A s-ana laura mini cork tissue marker was placed.  Clip was in satisfactory position.  Pathology returned as atypical ductal hyperplasia which is concordant.  Pathology:  Right breast, 10:30:  Fibrocystic changes with apocrine metaplasia, columnar cell change, cystic ducts, and atypical ductal hyperplasia    12/27/2022 Bilateral Breast MRI:  IMPRESSION:  High risk screening breast MR demonstrating no MR findings of malignancy.   BI-RADS Category 1: Negative.    2/9/2023 Right breast wire localized excisional biopsy:  Final Diagnosis   1. Right Breast, Oriented Needle Localization Lumpectomy (11 grams): LOW GRADE DUCTAL CARCINOMA IN SITU (DCIS).   A. Tumor site: 10:30 o'clock.               B. Extent of DCIS: DCIS is multifocal, contiguously spanning approximately 25 mm from medial to lateral.               C. Architecture patterns: Solid and micropapillary.               D. Nuclear grade: Low.               E. Margins: DCIS focally extends to the anterior (3.0 mm focus) and inferior (0.4 mm) margins, is present 1.5 mm       from the superior margin (2.0 mm focus), 2.3 mm from the posterior margin and 5 mm from medial and lateral margins of excision.  F. No lymph nodes submitted.  G. Hormone receptor status: ER moderately positive, CA strongly positive, Ki67 ~10% (see biomarker template).   H. Pathologic stage: pTis, NX.      3/29/2023 Bilateral breast skin sparing mastectomy with right sentinel lymph node biopsy:  Final Diagnosis   1. Breast, Left, Mastectomy: Breast tissue with  A. Atypical ductal hyperplasia.  B. Florid ductal hyperplasia of the usual type.  C. Simple  cyst.  D. Secretory change.  E. Duct ectasia.  F. Clustered cysts.   2. Lymph Node, Right [place] Node #1, Excision (2 mm slices):               A. Single benign lymph node.  3. Breast, Right, Mastectomy: Ductal carcinoma in situ.               A. The ductal carcinoma in situ is of low to intermediate nuclear grade with solid and cribriform architecture and focal necrosis.   B. The ductal carcinoma in situ measures up to 40 mm  (4 consecutive 1 cm slices).  C. The ductal carcinoma in situ comes within 2.5 mm of the posterior margin, 10 mm of the medial margin,       16 mm of the anterior margin, 75 mm of the inferior margin, 85 mm of the superior and 130 mm of the lateral margin.  D. Microcalcifications are not associated with the ductal carcinoma in situ.  E. Calcifications are present with non-neoplastic tissue.  F. Foci of atypical ductal hyperplasia  G. Lumpectomy site changes are present with the ductal carcinoma in situ surrounding the areas of the lumpectomy site.  Comment: The case has been previously staged [id number] that will not be repeated as only residual ductal carcinoma in situ is identified. It was originally staged as Pathologic stage pTis, NX. Given the additional lymph nodes it is pTis, pN0. Hormone receptors were performed on prior tissue and documented in case [id number] which are ER moderate positivity, WA strongly positive, Ki-67 10%. The ER was 81-90% positive. The WA was % positive.  4. Lymph Node, Right [place] Node #2, Excision (2 mm slices):               A. Single benign lymph node.     MEDICAL HISTORY:   Gynecologic History:   . P:6 AB:1  Age at first childbirth: 21  Lactation/How long: Yes  Age at menarche: 14  Age at menopause: Unsure  Total years of oral contraceptive use: 21 years previously  Total years of hormone replacement therapy: 5 years    Past Medical History:   Past Medical History:   Diagnosis Date    Abnormal ECG May 2016    Anxiety     Asthma     Breast  atypical hyperplasia     Breast cancer 2023    Cholelithiasis 2022    Pain on my right side    Chronic kidney disease     hx of kidney stones    Coronary artery disease May 2026    POTS    DDD (degenerative disc disease), lumbar     Depression     Ductal carcinoma in situ (DCIS) of right breast 2023    GERD (gastroesophageal reflux disease)     History of ear surgery     Hx of hysterectomy 2018    Hypertension     Hypoglycemia     Migraines     Monoclonal (M) protein disease, 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    PONV (postoperative nausea and vomiting) 2006    Torn acl repair work up from surgery very nauseous    POTS (postural orthostatic tachycardia syndrome)     Rectal bleeding 10/2021    Due to constipation    S/P ACL surgery     S/P removal of ovarian cyst     Tattoos       Past Surgical History:    Past Surgical History:   Procedure Laterality Date    ABLATION OF DYSRHYTHMIC FOCUS  2021    [facility]    BREAST AUGMENTATION Bilateral 2023    BREAST BIOPSY  Oct 2022    Need surgery right    BREAST LUMPECTOMY Right 2023    Procedure: Right breast wire localized excisional biopsy;  Surgeon: Dasia Marroquin MD;  Location: [facility];  Service: General;  Laterality: Right;    BREAST RECONSTRUCTION Bilateral 2023    Procedure: BILATERAL IMMEDIATE BREAST  RECONSTRUCTION  WITH TISSUE EXPANDERS AND BIOLOGIC;  Surgeon: Georgia Baer MD;  Location: [facility];  Service: Plastics;  Laterality: Bilateral;    BREAST SURGERY  23    Found cancer    CARDIAC CATHETERIZATION  2021    CARDIOVASCULAR STRESS TEST  2018    R.Stress- 6 Min. 46 secs. 7.0 METS. 76% THR. BP- 121/73. Pt had CP. Inconclusive test    CARDIOVASCULAR STRESS TEST  10/17/2018    L. Cardiolite- Negative.    CARDIOVASCULAR STRESS TEST  2022    Lexiscan- EF > 70%. Negative     SECTION      X 3    CHOLECYSTECTOMY      CHOLECYSTECTOMY  2023    CONVERTED (HISTORICAL) HOLTER  2017     @[facility].AVG HR 98 BPM    CONVERTED (HISTORICAL) HOLTER  2021    <8 Days. .. 1 run of SVT    CONVERTED (HISTORICAL) HOLTER  2021    Baseline sinus tach, AVG , current advised, follow with EP    ECHO - CONVERTED  2017    @[facility]. EF 60%. Mild AI    ECHO - CONVERTED  2017    EF 60%. MIld- Mod AI. No Shunt    ECHO - CONVERTED  2018    EF 55%. Mild AI & MR. RVSP- 26 mmHg.    ECHO - CONVERTED  2020    @ [facility]. EF 60%. Mild MR & AI. No AS. RVSP- 26 mmHg    FOOT SURGERY Right     HAND SURGERY Right     KNEE ACL RECONSTRUCTION Left     LAPAROSCOPIC TUBAL LIGATION      MASTECTOMY W/ SENTINEL NODE BIOPSY Right 2023    Procedure: bilateral skin sparing mastectomy, right sentinel lymph node biopsy;  Surgeon: Dasia Marroquin MD;  Location: [facility];  Service: General;  Laterality: Right;    OTHER SURGICAL HISTORY  07/10/2017    Extended Monitor- AVG HR 81 BPM. [place] an Syncope with no EKG changes    OTHER SURGICAL HISTORY  2018    Tilt Table- Positive.    OTHER SURGICAL HISTORY  2019    MCOT:  High , low 51, AVG 82, sinus noted, rare PVC, no arrythymia    RIGHT OOPHORECTOMY      STAPEDECTOMY      US CAROTID UNILATERAL  2019    Jensen:  No flow limiting stenosis bilaterally     Family History:    Family History   Problem Relation Age of Onset    Diabetes Mother     Arthritis Father     Asthma Father         Covid related    Breast cancer Sister     Cancer Sister         Liver 2020 passed 2022    Early death Sister         Liver cancer  2022    No Known Problems Sister     Cancer Sister         Breast right     Miscarriages / Stillbirths Daughter         Miscarriage/ stillbirth    Heart disease Son         Caused by Kawasaki disease    Heart attack Son     Autism Son     Learning disabilities Son         Autism    Miscarriages / Stillbirths Son         Misscarriage/ stillbirth    Heart attack Paternal Grandfather         Heart  Attack    Heart disease Paternal Grandfather     Malig Hyperthermia Neg Hx      Social History:   Social History     Socioeconomic History    Marital status:      Spouse name: Darien    Number of children: 5   Tobacco Use    Smoking status: Never     Passive exposure: Never    Smokeless tobacco: Never   Vaping Use    Vaping status: Never Used   Substance and Sexual Activity    Alcohol use: Yes     Alcohol/week: 2.0 standard drinks of alcohol     Types: 2 Drinks containing 0.5 oz of alcohol per week     Comment: Maybe once a month if we go out to dinner    Drug use: No    Sexual activity: Yes     Partners: Male     Birth control/protection: None, Tubal ligation, Hysterectomy     Comment: Nov 2018 Hysterectomy was done leaving one overy left      Allergies:   Allergies   Allergen Reactions    Other Anaphylaxis and Hives     Soma    Carbamates Hives    Carisoprodol Hives    Triptans Hives     Medications:     Current Outpatient Medications:     ALBUTEROL SULFATE HFA IN, Inhale Every 4 (Four) Hours As Needed., Disp: , Rfl:     bisoprolol (ZEBeta) 5 MG tablet, Take 0.5 tablets by mouth Every Night. 1/2 to 1 tablet daily, Disp: 90 tablet, Rfl: 3    butalbital-acetaminophen-caffeine (FIORICET, ESGIC) -40 MG per tablet, Take 1 tablet by mouth Every 4 (Four) Hours As Needed for Headache., Disp: , Rfl:     Cyanocobalamin (Vitamin B 12) 500 MCG tablet, Take 1 tablet by mouth Daily. HELD FOR SURGERY, Disp: , Rfl:     cyclobenzaprine (FLEXERIL) 10 MG tablet, Take 2 tablets by mouth Daily. 2 tablets daily, Disp: , Rfl:     diazePAM (VALIUM) 2 MG tablet, Take 1 tablet by mouth 3 (Three) Times a Day., Disp: , Rfl:     diclofenac (VOLTAREN) 50 MG EC tablet, Take 1 tablet by mouth 2 (Two) Times a Day. HOLD PER MD INSTRUCTIONS PRIOR TO SURGERY, Disp: , Rfl:     Diclofenac Sodium (VOLTAREN) 1 % gel gel, Apply 4 g topically to the appropriate area as directed Daily., Disp: , Rfl:     dilTIAZem SR (CARDIZEM SR) 60 MG 12 hr  capsule, Take 1 tablet by mouth every day and can take 1 extra tablet daily as needed for palpitations, Disp: 180 capsule, Rfl: 3    Erenumab-aooe (AIMOVIG, 140 MG DOSE, SC), Inject 1 dose under the skin into the appropriate area as directed Every 30 (Thirty) Days., Disp: , Rfl:     fludrocortisone 0.1 MG tablet, Take 2 tablets by mouth Every Morning., Disp: 90 tablet, Rfl: 3    fluticasone (FLONASE) 50 MCG/ACT nasal spray, 2 sprays into the nostril(s) as directed by provider Every Night., Disp: , Rfl:     linaclotide (LINZESS) 290 MCG capsule capsule, Take 1 capsule by mouth Every Morning Before Breakfast., Disp: , Rfl:     Loratadine 10 MG capsule, Take 1 capsule by mouth Daily., Disp: , Rfl:     Magnesium 125 MG capsule, Take  by mouth., Disp: , Rfl:     methocarbamol (ROBAXIN) 750 MG tablet, Take 1 tablet by mouth Every 4 (Four) Hours., Disp: , Rfl:     metoclopramide (REGLAN) 10 MG tablet, Take 1 tablet by mouth 2 (Two) Times a Day Before Meals., Disp: , Rfl:     NON FORMULARY, Epidural-Bupivicane-lidocaine injections every 3 months, Disp: , Rfl:     omeprazole (priLOSEC) 40 MG capsule, Take 1 capsule by mouth Daily., Disp: , Rfl:     OnabotulinumtoxinA (BOTOX IJ), Inject  as directed Every 3 (Three) Months., Disp: , Rfl:     sertraline (ZOLOFT) 100 MG tablet, Take 1 tablet by mouth 2 (Two) Times a Day., Disp: , Rfl:     Spiriva Respimat 1.25 MCG/ACT aerosol solution inhaler, Inhale 2 puffs Daily., Disp: , Rfl:     topiramate (TOPAMAX) 50 MG tablet, Take 1.5 tablets by mouth 2 (Two) Times a Day. 1 1/2 tablets twice a day, Disp: , Rfl:     traZODone (DESYREL) 50 MG tablet, Take 1 tablet by mouth Every Night., Disp: , Rfl:     Labs:    No recent labs.     ROS:   Constitutional: Negative for fevers or chills  HENT: Negative for hearing loss or runny nose  Eyes: Negative for vision changes or scleral icterus  Respiratory: Negative for cough or shortness of breath  Cardiovascular: Negative for chest pain or heart  "palpitations  Gastrointestinal: Negative for abdominal pain, nausea, vomiting, constipation, melena, or hematochezia  Genitourinary: Negative for hematuria or dysuria  Musculoskeletal: Negative for joint swelling or gait instability  Neurologic: Negative for tremors or seizures  Psychiatric: Negative for suicidal ideations or depression  All other systems reviewed and negative    PHYSICAL EXAM:   ECO - Asymptomatic  Constitutional: Well-developed, well-nourished, no acute distress  Ht: 162.6cm (64\")  Wt: 80.7kg (178lb)  BMI: 30.55  Eyes: Conjunctiva normal, sclera nonicteric  ENMT: Hearing grossly normal, oral mucosa moist  Neck: Supple, no palpable mass, trachea midline  Respiratory: Clear to auscultation, normal inspiratory effort  Cardiovascular: Regular rate, no murmur, no peripheral edema, no jugular venous distention  Breast: palpable implants   Bilateral mastectomy incisions healed. No masses or skin changes.   No clinical chest wall involvement.  Lymphatics (palpable nodes): No cervical, supraclavicular, or axillary lymphadenopathy  Skin: Warm, dry, no rash on visualized skin surfaces  Musculoskeletal: Symmetric strength, normal gait  Psychiatric: Alert and oriented ×3, normal affect     Heather Sumner PA-C    [facility] - General Surgery   [address], [address]  [place] [address]    [address], [address]  [place] [address]    Office: [phone]  Fax: [phone]    "

## 2024-06-10 RX ORDER — BISOPROLOL FUMARATE 5 MG/1
2.5 TABLET, FILM COATED ORAL NIGHTLY
Qty: 90 TABLET | Refills: 3 | Status: SHIPPED | OUTPATIENT
Start: 2024-06-10

## 2024-07-01 ENCOUNTER — TELEPHONE (OUTPATIENT)
Dept: ONCOLOGY | Facility: CLINIC | Age: 49
End: 2024-07-01
Payer: OTHER GOVERNMENT

## 2024-07-01 NOTE — TELEPHONE ENCOUNTER
Provider: Justin  Caller: patient  Relationship to Patient: self  Call Back Phone Number: [phone]  Reason for Call: patient has itching on the side of both of her breast

## 2024-07-01 NOTE — TELEPHONE ENCOUNTER
Patient saying she is having some internal itching around breast area.  Dr. ALMANZAR stated she wanted to see her for a f/u. Sent to scheduling.

## 2024-07-12 ENCOUNTER — OFFICE VISIT (OUTPATIENT)
Dept: ONCOLOGY | Facility: CLINIC | Age: 49
End: 2024-07-12
Payer: OTHER GOVERNMENT

## 2024-07-12 ENCOUNTER — LAB (OUTPATIENT)
Dept: LAB | Facility: HOSPITAL | Age: 49
End: 2024-07-12
Payer: OTHER GOVERNMENT

## 2024-07-12 VITALS
DIASTOLIC BLOOD PRESSURE: 63 MMHG | BODY MASS INDEX: 32.13 KG/M2 | WEIGHT: 188.2 LBS | SYSTOLIC BLOOD PRESSURE: 100 MMHG | HEART RATE: 73 BPM | RESPIRATION RATE: 12 BRPM | OXYGEN SATURATION: 98 % | HEIGHT: 64 IN | TEMPERATURE: 97.7 F

## 2024-07-12 DIAGNOSIS — L29.9 ITCHING: ICD-10-CM

## 2024-07-12 DIAGNOSIS — N60.91 ATYPICAL DUCTAL HYPERPLASIA OF BREAST, BILATERAL: Primary | ICD-10-CM

## 2024-07-12 DIAGNOSIS — D05.10 DUCTAL CARCINOMA IN SITU (DCIS) OF BREAST, UNSPECIFIED LATERALITY: ICD-10-CM

## 2024-07-12 DIAGNOSIS — N60.91 ATYPICAL DUCTAL HYPERPLASIA OF BREAST, BILATERAL: ICD-10-CM

## 2024-07-12 DIAGNOSIS — N60.92 ATYPICAL DUCTAL HYPERPLASIA OF BREAST, BILATERAL: Primary | ICD-10-CM

## 2024-07-12 DIAGNOSIS — N60.92 ATYPICAL DUCTAL HYPERPLASIA OF BREAST, BILATERAL: ICD-10-CM

## 2024-07-12 LAB
ALBUMIN SERPL-MCNC: 3.9 G/DL (ref 3.5–5.2)
ALBUMIN/GLOB SERPL: 1.3 G/DL
ALP SERPL-CCNC: 89 U/L (ref 39–117)
ALT SERPL W P-5'-P-CCNC: 23 U/L (ref 1–33)
ANION GAP SERPL CALCULATED.3IONS-SCNC: 8.2 MMOL/L (ref 5–15)
AST SERPL-CCNC: 23 U/L (ref 1–32)
BASOPHILS # BLD AUTO: 0.06 10*3/MM3 (ref 0–0.2)
BASOPHILS NFR BLD AUTO: 0.9 % (ref 0–1.5)
BILIRUB SERPL-MCNC: <0.2 MG/DL (ref 0–1.2)
BUN SERPL-MCNC: 13 MG/DL (ref 6–20)
BUN/CREAT SERPL: 15.1 (ref 7–25)
CALCIUM SPEC-SCNC: 8.9 MG/DL (ref 8.6–10.5)
CHLORIDE SERPL-SCNC: 107 MMOL/L (ref 98–107)
CO2 SERPL-SCNC: 25.8 MMOL/L (ref 22–29)
CREAT SERPL-MCNC: 0.86 MG/DL (ref 0.57–1)
DEPRECATED RDW RBC AUTO: 47.4 FL (ref 37–54)
EGFRCR SERPLBLD CKD-EPI 2021: 83.5 ML/MIN/1.73
EOSINOPHIL # BLD AUTO: 0.31 10*3/MM3 (ref 0–0.4)
EOSINOPHIL NFR BLD AUTO: 4.7 % (ref 0.3–6.2)
ERYTHROCYTE [DISTWIDTH] IN BLOOD BY AUTOMATED COUNT: 13.2 % (ref 12.3–15.4)
GLOBULIN UR ELPH-MCNC: 2.9 GM/DL
GLUCOSE SERPL-MCNC: 104 MG/DL (ref 65–99)
HCT VFR BLD AUTO: 42.1 % (ref 34–46.6)
HGB BLD-MCNC: 13.9 G/DL (ref 12–15.9)
IMM GRANULOCYTES # BLD AUTO: 0.02 10*3/MM3 (ref 0–0.05)
IMM GRANULOCYTES NFR BLD AUTO: 0.3 % (ref 0–0.5)
LYMPHOCYTES # BLD AUTO: 2.27 10*3/MM3 (ref 0.7–3.1)
LYMPHOCYTES NFR BLD AUTO: 34.6 % (ref 19.6–45.3)
MCH RBC QN AUTO: 31.9 PG (ref 26.6–33)
MCHC RBC AUTO-ENTMCNC: 33 G/DL (ref 31.5–35.7)
MCV RBC AUTO: 96.6 FL (ref 79–97)
MONOCYTES # BLD AUTO: 0.38 10*3/MM3 (ref 0.1–0.9)
MONOCYTES NFR BLD AUTO: 5.8 % (ref 5–12)
NEUTROPHILS NFR BLD AUTO: 3.52 10*3/MM3 (ref 1.7–7)
NEUTROPHILS NFR BLD AUTO: 53.7 % (ref 42.7–76)
NRBC BLD AUTO-RTO: 0 /100 WBC (ref 0–0.2)
PLATELET # BLD AUTO: 247 10*3/MM3 (ref 140–450)
PMV BLD AUTO: 9.3 FL (ref 6–12)
POTASSIUM SERPL-SCNC: 3.8 MMOL/L (ref 3.5–5.2)
PROT SERPL-MCNC: 6.8 G/DL (ref 6–8.5)
RBC # BLD AUTO: 4.36 10*6/MM3 (ref 3.77–5.28)
SODIUM SERPL-SCNC: 141 MMOL/L (ref 136–145)
WBC NRBC COR # BLD AUTO: 6.56 10*3/MM3 (ref 3.4–10.8)

## 2024-07-12 PROCEDURE — 85025 COMPLETE CBC W/AUTO DIFF WBC: CPT

## 2024-07-12 PROCEDURE — 80053 COMPREHEN METABOLIC PANEL: CPT

## 2024-07-12 PROCEDURE — 36415 COLL VENOUS BLD VENIPUNCTURE: CPT

## 2024-07-12 PROCEDURE — 99214 OFFICE O/P EST MOD 30 MIN: CPT | Performed by: INTERNAL MEDICINE

## 2024-07-12 NOTE — PROGRESS NOTES
Subjective       REASON FOR follow-up:     Pathologic Tis N0 M0, stage 0, newly diagnosed right breast ductal carcinoma in situ, grade 1 ER %, NM %, KI 6710% s/p bilateral mastectomy  Patient was followed with observation with plans to release from our office but complained of severe itching on the lateral side of the reconstructed breast bilaterally which is concerning for her since she has had that prior to her diagnosis of DCIS    HISTORY OF PRESENT ILLNESS:    Patient is 47-year-old female with strong family history of cancer recently diagnosed with DCIS s/p bilateral mastectomy.  She had a [facility] genetic testing done she is here for the results.  I reviewed the results her 9 gene panel for breast cancer is negative.  However patient has a strong family history of prostate cancer in dad.  Her older sister had renal cell cancer at 50 but  at 52.  Her second sister had breast cancer at age 30 and is alive at age 50.  She had a cousin with leukemia and a cousin with brain tumor.    Initially she had undergone a BRCA testing in  which she said was negative but we do not have the results.  We sent her for [facility] genetic testing which shows the 9 gene panel for breast cancer is negative.  There is no family history of colon cancer.  She is very anxious about this.    We discussed with her to get counseling from the genetic counselor and hence we will refer her there.      2024: Patient states she has itching bilaterally on the reconstructed breast laterally.  She does not have a rash.  She is a very anxious person and thinks that there is concern for malignancy.  However we tried to reassure her that itching does not [employer] mean malignancy.  But she complains of tenderness on the medial side of the right reconstructed breast and is concerned of recurrence.  She has seen Rosalio Javed on 2024.  Patient does complain of POTS and dizziness which is chronic.  She [place] and drives 2-1/2 hours away from here.    She has not lost weight and she has a good appetite but she is very concerned about this itching bilaterally and tenderness on the medial aspect of the right reconstructed breast on the ribs.  I assured her that this is not related to underlying malignancy as she had stage 0 DCIS but she is very concerned about the 10 mg tenderness as well as the itching and we discussed about obtaining a CT scan to confirm there is to reassure there is no concern.  We also discussed about discussing with dermatology for the itching and to Dr. Baer who did the breast reconstruction to see if itching was related to nerve endings and irritation.      Oncologic history  Patient is a 47-year-old female who has had problems with itching on the right breast for quite some time.  She had bilateral diagnostic mammogram with ultrasound by seen on ultrasound.    Patient has a family history of breast cancer in 1 sister who was 52 and  of breast cancer at 54 with liver metastasis.  A second sister had breast cancer at age 30 and is currently alive at age 50.  She required chemotherapy.  Her maternal aunt's daughter had breast cancer and another maternal aunt had brain tumor in her 50s.  On the paternal side there is leukemia in a cousin at age 50.  Patient sisters have not been tested but patient tells me that her testing was done.  INV[facility]AE genetic test last week, results are pending.  Patient states that she had BRCA testing done in the past by Dr. Adin kenyon in  because of her 2 sisters being diagnosed with breast cancer and her BRCA testing was negative but we do not have the actual report.    Details are as follows    10/12/2022 bilateral diagnostic mammogram with ultrasound:  Finding 1: There is no radiographic abnormality in the region of the pain in the right breast at 9:00.  There are no suspicious masses, calcifications, or areas of architectural distortion.  Finding 2:  There is no radiographic abnormality in the region of the pain in the left breast at 6:00.  No solid or suspicious masses are seen.  Remainder of report pending.    October 12, 2022: Bilateral breast ultrasound showed  1.  Simple cyst with circumscribed margins 4 mm at 9 o'clock position which correlates with pain in the right breast  2.  There is no sonographic correlate for the area of pain in the left breast at 6:00.  No solid or suspicious masses seen  3.  There is a oval parallel complicated cyst versus solid mass with circumscribed margins 8 x 5 x 6 mm at the 10:30 position in the right breast 6 cm from the nipple.     11/10/2022 right breast ultrasound-guided biopsy:  The mass in the right breast at 1030 was located.  5 cores were obtained.  A mini cork tissue marker was placed.  Clip was in satisfactory position.  Pathology returned as atypical ductal hyperplasia which is concordant.     11/10/2022 pathology:  Right breast, 1030:  Atypical ductal hyperplasia     12/27/2022 Bilateral Breast MRI   IMPRESSION:  High risk screening breast MR demonstrating no MR findings of malignancy.   BIRADS: DIAGNOSTIC CATEGORY 1--NEGATIVE.       2/9/2023 Right breast wire localized excisional biopsy  Final Diagnosis   1. Right Breast, Oriented Needle Localization Lumpectomy (11 grams): LOW GRADE DUCTAL CARCINOMA IN SITU (DCIS).  A. Tumor site: 10:30 o'clock.               B. Extent of DCIS: DCIS is multifocal, contiguously spanning approximately 25 mm from medial to lateral.               C. Architecture patterns: Solid and micropapillary.               D. Nuclear grade: Low.               E. Margins: DCIS focally extends to the anterior (3.0 mm focus) and inferior (0.4 mm) margins, is present 1.5 mm       from the superior margin (2.0 mm focus), 2.3 mm from the posterior margin and 5 mm from medial and lateral       margins of excision.  F. No lymph nodes submitted.  G. Hormone receptor status: ER moderately positive, OR  strongly positive, Ki67 ~10% (see biomarker template).   H. Pathologic stage: pTis, NX.       3/29/2023 Bilateral breast skin sparing mastectomy with right sentinel lymph node biopsy   Final Diagnosis   1. Breast, Left, Mastectomy: Breast tissue with  A. Atypical ductal hyperplasia.  B. Florid ductal hyperplasia of the usual type.  C. Simple cyst.  D. Secretory change.  E. Duct ectasia.  F. Clustered cysts.     2. Lymph Node, Right Mystic Node #1, Excision (2 mm slices):               A. Single benign lymph node.     3. Breast, Right, Mastectomy: Ductal carcinoma in situ.               A. The ductal carcinoma in situ is of low to intermediate nuclear grade with solid and cribriform architecture and        focal necrosis.   B. The ductal carcinoma in situ measures up to 40 mm  (4 consecutive 1 cm slices).  C. The ductal carcinoma in situ comes within 2.5 mm of the posterior margin, 10 mm of the medial margin,       16 mm of the anterior margin, 75 mm of the inferior margin, 85 mm of the superior and 130 mm of the lateral         margin.  D. Microcalcifications are not associated with the ductal carcinoma in situ.  E. Calcifications are present with non-neoplastic tissue.  F. Foci of atypical ductal hyperplasia  G. Lumpectomy site changes are present with the ductal carcinoma in situ surrounding the areas of the        lumpectomy site.     Comment: The case has been previously staged [id number] that will not be repeated as only residual ductal carcinoma in situ is identified. It was originally staged as Pathologic stage pTis, NX. Given the additional lymph nodes it is pTis, pN0. Hormone receptors were performed on prior tissue and documented in case [id number] which are ER moderate positivity, IN strongly positive, Ki-67 10%. The ER was 81-90% positive. The IN was % positive.     4. Lymph Node, Right Mystic Node #2, Excision (2 mm slices):               A. Single benign lymph node.       Patient is healing  from surgery and referred here for evaluation following bilateral mastectomy with reconstruction      Past Medical History:   Diagnosis Date    Abnormal ECG May 2016    Anxiety     Asthma     Breast atypical hyperplasia     Breast cancer 1/2023    Cholelithiasis 8/2022    Pain on my right side    Chronic kidney disease     hx of kidney stones    Coronary artery disease May 2026    POTS    DDD (degenerative disc disease), lumbar     Depression     Ductal carcinoma in situ (DCIS) of right breast 02/28/2023    GERD (gastroesophageal reflux disease)     History of ear surgery     Hx of hysterectomy 06/12/2018    Hypertension     Hypoglycemia     Migraines     Monoclonal (M) protein disease, 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    PONV (postoperative nausea and vomiting) 2006    Torn acl repair work up from surgery very nauseous    POTS (postural orthostatic tachycardia syndrome)     Rectal bleeding 10/2021    Due to constipation    S/P ACL surgery     S/P removal of ovarian cyst     Tattoos     Thyroid nodule         Past Surgical History:   Procedure Laterality Date    ABLATION OF DYSRHYTHMIC FOCUS  02/2021    [facility]    BREAST AUGMENTATION Bilateral 09/19/2023    BREAST BIOPSY  Oct 2022    Need surgery right    BREAST LUMPECTOMY Right 02/09/2023    Procedure: Right breast wire localized excisional biopsy;  Surgeon: Dasia Marroquin MD;  Location: [facility];  Service: General;  Laterality: Right;    BREAST RECONSTRUCTION Bilateral 03/29/2023    Procedure: BILATERAL IMMEDIATE BREAST  RECONSTRUCTION  WITH TISSUE EXPANDERS AND BIOLOGIC;  Surgeon: Georgia Baer MD;  [facility];  Service: Plastics;  Laterality: Bilateral;    BREAST SURGERY  2/9/23    Found cancer    CARDIAC CATHETERIZATION  02/2021    CARDIOVASCULAR STRESS TEST  08/21/2018    R.Stress- 6 Min. 46 secs. 7.0 METS. 76% THR. BP- 121/73. Pt had CP. Inconclusive test    CARDIOVASCULAR STRESS TEST  10/17/2018    L. Cardiolite- Negative.     CARDIOVASCULAR STRESS TEST  2022    Lexiscan- EF > 70%. Negative     SECTION      X 3    CHOLECYSTECTOMY      CHOLECYSTECTOMY  2023    CONVERTED (HISTORICAL) HOLTER  2017    @[facility].AVG HR 98 BPM    CONVERTED (HISTORICAL) HOLTER  2021    <8 Days. .. 1 run of SVT    CONVERTED (HISTORICAL) HOLTER  2021    Baseline sinus tach, AVG , current advised, follow with EP    ECHO - CONVERTED  2017    @[facility]. EF 60%. Mild AI    ECHO - CONVERTED  2017    EF 60%. MIld- Mod AI. No Shunt    ECHO - CONVERTED  2018    EF 55%. Mild AI & MR. RVSP- 26 mmHg.    ECHO - CONVERTED  2020    @ [facility]. EF 60%. Mild MR & AI. No AS. RVSP- 26 mmHg    FOOT SURGERY Right     HAND SURGERY Right     KNEE ACL RECONSTRUCTION Left     LAPAROSCOPIC TUBAL LIGATION      MASTECTOMY W/ SENTINEL NODE BIOPSY Right 2023    Procedure: bilateral skin sparing mastectomy, right sentinel lymph node biopsy;  Surgeon: Dasia Marroquin MD;  Location: [facility];  Service: General;  Laterality: Right;    OTHER SURGICAL HISTORY  07/10/2017    Extended Monitor- AVG HR 81 BPM. [place] an Syncope with no EKG changes    OTHER SURGICAL HISTORY  2018    Tilt Table- Positive.    OTHER SURGICAL HISTORY  2019    MCOT:  High , low 51, AVG 82, sinus noted, rare PVC, no arrythymia    RIGHT OOPHORECTOMY      STAPEDECTOMY      US CAROTID UNILATERAL  2019    Jensen:  No flow limiting stenosis bilaterally        Current Outpatient Medications on File Prior to Visit   Medication Sig Dispense Refill    ALBUTEROL SULFATE HFA IN Inhale Every 4 (Four) Hours As Needed.      bisoprolol (ZEBeta) 5 MG tablet TAKE ONE-HALF (1/2) TABLET EVERY NIGHT 90 tablet 3    butalbital-acetaminophen-caffeine (FIORICET, ESGIC) -40 MG per tablet Take 1 tablet by mouth Every 4 (Four) Hours As Needed for Headache.      cyclobenzaprine (FLEXERIL) 10 MG tablet Take 2 tablets by mouth Daily. 2 tablets daily       diazePAM (VALIUM) 2 MG tablet Take 1 tablet by mouth 3 (Three) Times a Day.      diclofenac (VOLTAREN) 50 MG EC tablet Take 1 tablet by mouth 2 (Two) Times a Day. HOLD PER MD INSTRUCTIONS PRIOR TO SURGERY      Diclofenac Sodium (VOLTAREN) 1 % gel gel Apply 4 g topically to the appropriate area as directed Daily.      dilTIAZem SR (CARDIZEM SR) 60 MG 12 hr capsule Take 1 tablet by mouth every day and can take 1 extra tablet daily as needed for palpitations 180 capsule 3    Erenumab-aooe (AIMOVIG, 140 MG DOSE, SC) Inject 1 dose under the skin into the appropriate area as directed Every 30 (Thirty) Days.      escitalopram (LEXAPRO) 10 MG tablet       fluconazole (DIFLUCAN) 200 MG tablet Take 1 tablet by mouth Daily.      fludrocortisone 0.1 MG tablet Take 2 tablets by mouth Every Morning. 90 tablet 3    fluticasone (FLONASE) 50 MCG/ACT nasal spray 2 sprays into the nostril(s) as directed by provider Every Night.      ketorolac (TORADOL) 10 MG tablet Take 1 tablet by mouth 2 (Two) Times a Day As Needed. for pain      linaclotide (LINZESS) 290 MCG capsule capsule Take 1 capsule by mouth Every Morning Before Breakfast.      Loratadine 10 MG capsule Take 1 capsule by mouth Daily.      methocarbamol (ROBAXIN) 750 MG tablet Take 1 tablet by mouth Every 4 (Four) Hours.      metoclopramide (REGLAN) 10 MG tablet Take 1 tablet by mouth 2 (Two) Times a Day Before Meals.      NON FORMULARY Epidural-Bupivicane-lidocaine injections every 3 months      NON FORMULARY 125 mg Daily As Needed. Sodium tablets      omeprazole (priLOSEC) 40 MG capsule Take 1 capsule by mouth Daily.      OnabotulinumtoxinA (BOTOX IJ) Inject  as directed Every 3 (Three) Months.      Spiriva Respimat 1.25 MCG/ACT aerosol solution inhaler Inhale 2 puffs Daily.      topiramate (TOPAMAX) 50 MG tablet Take 1.5 tablets by mouth 2 (Two) Times a Day. 1 1/2 tablets twice a day      traZODone (DESYREL) 50 MG tablet Take 1 tablet by mouth Every Night.      ondansetron  ODT (ZOFRAN-ODT) 4 MG disintegrating tablet       sertraline (ZOLOFT) 100 MG tablet Take 1 tablet by mouth 2 (Two) Times a Day.       No current facility-administered medications on file prior to visit.        ALLERGIES:    Allergies   Allergen Reactions    Other Anaphylaxis and Hives     Soma    Carbamates Hives    Carisoprodol Hives    Triptans Hives        Social History     Socioeconomic History    Marital status:      Spouse name: Darien    Number of children: 5   Tobacco Use    Smoking status: Never     Passive exposure: Never    Smokeless tobacco: Never   Vaping Use    Vaping status: Never Used   Substance and Sexual Activity    Alcohol use: Yes     Alcohol/week: 2.0 standard drinks of alcohol     Types: 2 Drinks containing 0.5 oz of alcohol per week     Comment: Maybe once a month if we go out to dinner    Drug use: No    Sexual activity: Yes     Partners: Male     Birth control/protection: None, Tubal ligation, Hysterectomy     Comment: 2018 Hysterectomy was done leaving one overy left        Family History   Problem Relation Age of Onset    Diabetes Mother     Arthritis Father     Asthma Father         Covid related    Breast cancer Sister     Cancer Sister         Liver 2020 passed 2022    Early death Sister         Liver cancer  2022    Hearing loss Sister     No Known Problems Sister     Cancer Sister         Breast right     Miscarriages / Stillbirths Daughter         Miscarriage/ stillbirth    Heart disease Son         Caused by Kawasaki disease    Heart attack Son     Autism Son     Learning disabilities Son         Autism    Miscarriages / Stillbirths Son         Misscarriage/ stillbirth    Heart attack Paternal Grandfather         Heart Attack    Heart disease Paternal Grandfather     Malig Hyperthermia Neg Hx       OB/GYN history: Age of menstruation  13  Age of menopause in 2016 she had a hysterectomy with 1 ovary being removed  She is  7 para 5 2  "miscarriages.  She has not been on birth control pills      Review of Systems   Constitutional:  Negative for appetite change, chills, diaphoresis, fatigue, fever and unexpected weight change.   HENT:  Negative for hearing loss, sore throat and trouble swallowing.    Respiratory:  Negative for cough, chest tightness, shortness of breath and wheezing.    Cardiovascular:  Negative for chest pain, palpitations and leg swelling.   Gastrointestinal:  Negative for abdominal distention, abdominal pain, constipation, diarrhea, nausea and vomiting.   Genitourinary:  Negative for dysuria, frequency, hematuria and urgency.   Musculoskeletal:  Negative for joint swelling.        No muscle weakness.   Skin:  Negative for rash and wound.   Neurological:  Negative for seizures, syncope, speech difficulty, weakness, numbness and headaches.   Hematological:  Negative for adenopathy. Does not bruise/bleed easily.   Psychiatric/Behavioral:  Negative for behavioral problems, confusion and suicidal ideas.         Objective     Vitals:    07/12/24 1000   BP: 100/63   Pulse: 73   Resp: 12   Temp: 97.7 °F (36.5 °C)   TempSrc: Oral   SpO2: 98%   Weight: 85.4 kg (188 lb 3.2 oz)   Height: 162.6 cm (64.02\")           7/12/2024    10:00 AM   Current Status   ECOG score 0       Physical Exam        CONSTITUTIONAL:  Vital signs reviewed.  No distress, looks comfortable.  RESPIRATORY:  Normal respiratory effort.  Lungs clear to auscultation bilaterally.  S/p bilateral mastectomy with reconstruction, no chest wall lesions, mild tenderness on the medial aspect of the right reconstructed breast, severe itching bilaterally on the chest wall.  CARDIOVASCULAR:  Normal S1, S2.  No murmurs rubs or gallops.  No significant lower extremity edema.  GASTROINTESTINAL: Abdomen appears unremarkable.  Nontender.  No hepatomegaly.  No splenomegaly.  LYMPHATIC:  No cervical, supraclavicular, axillary lymphadenopathy.  SKIN:  Warm.  No rashes.  PSYCHIATRIC:  Normal " judgment and insight.  Normal mood and affect.      RECENT LABS:  Hematology WBC   Date Value Ref Range Status   07/12/2024 6.56 3.40 - 10.80 10*3/mm3 Final   02/25/2021 3.81 3.70 - 11.00 k/uL Final   08/31/2018 26 to 100 0 - 5 /hpf Final     Comment:     Culture indicated, results to follow.     RBC   Date Value Ref Range Status   07/12/2024 4.36 3.77 - 5.28 10*6/mm3 Final   02/25/2021 4.15 3.90 - 5.20 m/uL Final     Hemoglobin   Date Value Ref Range Status   07/12/2024 13.9 12.0 - 15.9 g/dL Final   02/25/2021 12.1 11.5 - 15.5 g/dL Final     Hematocrit   Date Value Ref Range Status   07/12/2024 42.1 34.0 - 46.6 % Final   02/25/2021 38.0 36.0 - 46.0 % Final     Platelets   Date Value Ref Range Status   07/12/2024 247 140 - 450 10*3/mm3 Final   02/25/2021 255 150 - 400 k/uL Final          Assessment & Plan     #. Pathologic Tis N0 M0, stage 0, newly diagnosed right breast ductal carcinoma in situ, grade 1 ER %, MO %, RAFAEL 6710%  Initially had symptoms of itching in the right breast  Bilateral diagnostic mammogram and ultrasound on October 2022 showed that the bilateral diagnostic mammogram was negative but the ultrasound showed a lesion at 10:30 position which could be cyst versus solid mass  Ultrasound-guided biopsy of the mass showed atypical ductal hyperplasia  February 9, 2022: Right breast excisional biopsy showed ductal carcinoma in situ, multifocal  S/p bilateral mastectomy with sentinel lymph nodes on the right sentinel lymph node surgery showed left breast was showing atypical ductal hyperplasia but right breast showed evidence of ductal carcinoma in situ.  40 mm ER/MO positive s/p bilateral men mastectomy and reconstruction  Patient does not require any endocrine therapy given that she has had bilateral mastectomy  [facility] genetic testing has been done and we will await the results of that  Patient lives in [facility] and has obtained BRCA testing at initial hospital which apparently was  negative and was done by Dr. Adin Boyd   Patient is s/p bilateral mastectomy for DCIS and does not require any endocrine therapy  2023: [facility]AE genetic test negative for 9 gene panel.  Patient's concern for malignancy is very high as there is strong family history of different cancers.  I discussed with her to go for counseling with genetics counselor.  2024: We had discussed with her about releasing her from our office but patient concerned about being released and she is now complaining of severe bilateral skin itching though we do not see any rash.  She is concerned it could be mean recurrence of underlying malignancy or a new malignancy.  I assured her that DCIS usually does not present that way.  She also complains of pain on the medial aspect of the right reconstructed breast given her concern we will obtain a CT scan to reassure that there is no concern.  It is potential it could be the itching could be related to irritation of the nerve endings at surgery but I am unsure.  She is going to discuss with Dr. Baer    #.  Family history of breast cancer  Patient's sister had renal cell at age 50 and  of renal cell cancer at age 52 with liver metastasis  Patient had a second sister who had breast cancer at age 30 and required chemotherapy and still alive at 50  Maternal cousin had brain cancer and paternal cousin had leukemia around 50  Patient had BRCA testing in  but did not have the results  [facility]AE genetic test was done in Dr. Marroquin's office  INVITAE genetic test negative for 9 gene panel    #.  Pain at the left chest wall site at the left axillary region likely secondary to surgery but no evidence of infection    Plan  Had lengthy discussion about the INVITAE genetic test which is negative for the 9 gene panel  Given strong family history of different cancers in the family she is very concerned about malignancy  She is s/p bilateral mastectomy for her DCIS and does not need any  treatment and could be released from here  However given her anxiety I will refer her to genetics counselor  Given complaints of severe itching bilaterally and pain on the medial aspect of the right reconstructed breast will obtain a CT scan  We will discuss the results of CT scan on the phone but she will come and see the nurse practitioner in 2 months at which time if CT scan negative and referral made to Dr. Baer as well as dermatology and if workup negative then she can be released from our office to be followed by surgery Dr. Marroquin  Will discuss the results of CT scan on the telephone.  It is scheduled on July 25.  Once CT is done I will call her on the telephone.    MD Dr. Liliya Parker

## 2024-07-31 ENCOUNTER — HOSPITAL ENCOUNTER (OUTPATIENT)
Dept: PHYSICAL THERAPY | Facility: HOSPITAL | Age: 49
Discharge: HOME | End: 2024-07-31
Payer: OTHER GOVERNMENT

## 2024-07-31 ENCOUNTER — HOSPITAL ENCOUNTER (OUTPATIENT)
Dept: PET IMAGING | Facility: HOSPITAL | Age: 49
Discharge: HOME | End: 2024-07-31
Payer: OTHER GOVERNMENT

## 2024-07-31 DIAGNOSIS — N60.91 ATYPICAL DUCTAL HYPERPLASIA OF BREAST, BILATERAL: ICD-10-CM

## 2024-07-31 DIAGNOSIS — Z91.89 AT RISK FOR LYMPHEDEMA: ICD-10-CM

## 2024-07-31 DIAGNOSIS — Z90.13 STATUS POST BILATERAL MASTECTOMY: Primary | ICD-10-CM

## 2024-07-31 DIAGNOSIS — N60.92 ATYPICAL DUCTAL HYPERPLASIA OF BREAST, BILATERAL: ICD-10-CM

## 2024-07-31 PROCEDURE — 97535 SELF CARE MNGMENT TRAINING: CPT

## 2024-07-31 PROCEDURE — 0 DIATRIZOATE MEGLUMINE & SODIUM PER 1 ML: Performed by: INTERNAL MEDICINE

## 2024-07-31 PROCEDURE — 74177 CT ABD & PELVIS W/CONTRAST: CPT

## 2024-07-31 PROCEDURE — 93702 BIS XTRACELL FLUID ANALYSIS: CPT

## 2024-07-31 PROCEDURE — 25510000001 IOPAMIDOL 61 % SOLUTION: Performed by: INTERNAL MEDICINE

## 2024-07-31 PROCEDURE — 71260 CT THORAX DX C+: CPT

## 2024-07-31 RX ADMIN — DIATRIZOATE MEGLUMINE AND DIATRIZOATE SODIUM 30 ML: 660; 100 LIQUID ORAL; RECTAL at 10:15

## 2024-07-31 RX ADMIN — IOPAMIDOL 85 ML: 612 INJECTION, SOLUTION INTRAVENOUS at 11:12

## 2024-07-31 NOTE — THERAPY RE-EVALUATION
Physical Therapy Lymphedema Re-Evaluation  [facility]     Patient Name: Dolores Dejesus  : 1975  MRN: [id number]  Today's Date: 2024      Visit Date: 2024    Visit Dx:    ICD-10-CM ICD-9-CM   1. Status post bilateral mastectomy  Z90.13 V45.71   2. At risk for lymphedema  Z91.89 V49.89       Patient Active Problem List   Diagnosis    Near syncope    Dizziness    Migraine aura without headache    Tachycardia    Hx of migraine headaches    POTS (postural orthostatic tachycardia syndrome)    Palpitations    Metabolic syndrome    PSVT (paroxysmal supraventricular tachycardia)    History of cardiac radiofrequency ablation (RFA)    Atypical ductal hyperplasia of breast, bilateral    Ductal carcinoma in situ (DCIS) of breast        Past Medical History:   Diagnosis Date    Abnormal ECG May 2016    Anxiety     Asthma     Breast atypical hyperplasia     Breast cancer 2023    Cholelithiasis 2022    Pain on my right side    Chronic kidney disease     hx of kidney stones    Coronary artery disease May 2026    POTS    DDD (degenerative disc disease), lumbar     Depression     Ductal carcinoma in situ (DCIS) of right breast 2023    GERD (gastroesophageal reflux disease)     History of ear surgery     Hx of hysterectomy 2018    Hypertension     Hypoglycemia     Migraines     Monoclonal (M) protein disease, 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    PONV (postoperative nausea and vomiting) 2006    Torn acl repair work up from surgery very nauseous    POTS (postural orthostatic tachycardia syndrome)     Rectal bleeding 10/2021    Due to constipation    S/P ACL surgery     S/P removal of ovarian cyst     Tattoos     Thyroid nodule         Past Surgical History:   Procedure Laterality Date    ABLATION OF DYSRHYTHMIC FOCUS  2021    [facility]    BREAST AUGMENTATION Bilateral 2023    BREAST BIOPSY  Oct 2022    Need surgery right    BREAST LUMPECTOMY Right 2023    Procedure: Right breast  wire localized excisional biopsy;  Surgeon: Dasia Marroquin MD;  Location: [facility];  Service: General;  Laterality: Right;    BREAST RECONSTRUCTION Bilateral 2023    Procedure: BILATERAL IMMEDIATE BREAST  RECONSTRUCTION  WITH TISSUE EXPANDERS AND BIOLOGIC;  Surgeon: Georgia Baer MD;  Location: [facility];  Service: Plastics;  Laterality: Bilateral;    BREAST SURGERY  23    Found cancer    CARDIAC CATHETERIZATION  2021    CARDIOVASCULAR STRESS TEST  2018    R.Stress- 6 Min. 46 secs. 7.0 METS. 76% THR. BP- 121/73. Pt had CP. Inconclusive test    CARDIOVASCULAR STRESS TEST  10/17/2018    L. Cardiolite- Negative.    CARDIOVASCULAR STRESS TEST  2022    Lexiscan- EF > 70%. Negative     SECTION      X 3    CHOLECYSTECTOMY      CHOLECYSTECTOMY  2023    CONVERTED (HISTORICAL) HOLTER  2017    @[facility].AVG HR 98 BPM    CONVERTED (HISTORICAL) HOLTER  2021    <8 Days. .. 1 run of SVT    CONVERTED (HISTORICAL) HOLTER  2021    Baseline sinus tach, AVG , current advised, follow with EP    ECHO - CONVERTED  2017    @[facility]. EF 60%. Mild AI    ECHO - CONVERTED  2017    EF 60%. MIld- Mod AI. No Shunt    ECHO - CONVERTED  2018    EF 55%. Mild AI & MR. RVSP- 26 mmHg.    ECHO - CONVERTED  2020    @ [facility]. EF 60%. Mild MR & AI. No AS. RVSP- 26 mmHg    FOOT SURGERY Right     HAND SURGERY Right     KNEE ACL RECONSTRUCTION Left     LAPAROSCOPIC TUBAL LIGATION      MASTECTOMY W/ SENTINEL NODE BIOPSY Right 2023    Procedure: bilateral skin sparing mastectomy, right sentinel lymph node biopsy;  Surgeon: Dasia Marroquin MD;  Location: [facility];  Service: General;  Laterality: Right;    OTHER SURGICAL HISTORY  07/10/2017    Extended Monitor- AVG HR 81 BPM. [place] an Syncope with no EKG changes    OTHER SURGICAL HISTORY  2018    Tilt Table- Positive.    OTHER SURGICAL HISTORY  2019    MCOT:  High , low 51, AVG  82, sinus noted, rare PVC, no arrythymia    RIGHT OOPHORECTOMY      STAPEDECTOMY      US CAROTID UNILATERAL  04/09/2019    Jensen:  No flow limiting stenosis bilaterally       Visit Dx:    ICD-10-CM ICD-9-CM   1. Status post bilateral mastectomy  Z90.13 V45.71   2. At risk for lymphedema  Z91.89 V49.89            Lymphedema       Row Name 07/31/24 1400             Subjective Pain    Able to rate subjective pain? yes  -LB      Pre-Treatment Pain Level 2  -LB         Subjective    Subjective Comments I am doing pretty well. I am having issues B itchiness in my breasts.  -LB         Lymphedema Assessment    Lymphedema Classification RUE:;at risk/stage 0  -LB      Lymphedema Cancer Related Sx bilateral;modified radical mastectomy;right;sentinel node biopsy  -LB      Lymphedema Surgery Comments 3/29/23  -LB      Lymph Nodes Removed # 2  -LB      Positive Lymph Nodes # 0  -LB      Chemo Received no  -LB      Radiation Therapy Received no  -LB      Infections or Cellulitis? no  -LB         Posture/Observations    Alignment Options Forward head;Rounded shoulders  -LB      Forward Head Moderate  -LB      Rounded Shoulders Bilateral:;Moderate  -LB         General ROM    GENERAL ROM COMMENTS BUE WFL  -LB         Lymphedema Edema Assessment    Edema Assessment Comment pt denies breast edema  -LB         RUE Quick Girth (cm)    Axilla 36 cm  -LB      Mid upper arm 32 cm  -LB      Elbow 24.5 cm  -LB      Mid forearm 23.5 cm  -LB      Wrist crease 15.5 cm  -LB      RUE Quick Girth Total 131.5  -LB         Compression/Skin Care    Compression/Skin Care Comments medium Jobst Carmen Lite regular length (20-30 mmHg)  -LB         L-Dex Bioimpedence Screening    L-Dex Measurement Extremity RUE  -LB      L-Dex Patient Position Standing  -LB      L-Dex UE Dominate Side Right  -LB      L-Dex UE At Risk Side Right  -LB      L-Dex UE Pre Surgical Value No  -LB      L-Dex UE Score -1.1  -LB      L-Dex UE Baseline Score 3.9  -LB      L-Dex UE  Value Change -5  -LB      L-Dex UE Comment WNL; stable  -LB      $ L-Dex Charge yes  -LB                User Key  (r) = Recorded By, (t) = Taken By, (c) = Cosigned By      Initials Name Provider Type    Jennifer Mosquera, PT Physical Therapist                                    Therapy Education  Education Details: reviewed and progressed HEP, discussed goals of PT  Given: Symptoms/condition management  Program: Reinforced  How Provided: Verbal  Provided to: Patient  Level of Understanding: Verbalized, Demonstrated  77739 - PT Self Care/Mgmt Minutes: 10       OP Exercises       Row Name 07/31/24 1400             Subjective    Subjective Comments I am doing pretty well. I am having issues B itchiness in my breasts.  -LB         Subjective Pain    Able to rate subjective pain? yes  -LB      Pre-Treatment Pain Level 2  -LB                User Key  (r) = Recorded By, (t) = Taken By, (c) = Cosigned By      Initials Name Provider Type    Jennifer Mosquera, PT Physical Therapist                                     PT Assessment/Plan       Row Name 07/31/24 1505          PT Assessment    Functional Limitations Other (comment)  at risk for lymphedema  -LB     Impairments Edema;Impaired lymphatic circulation;Impaired postural alignment;Posture  -LB     Assessment Comments Pt returns for re-evaluation demonstrating AROM WFL and denies breast edema. She does state B breast itching that is being managed by plastic surgeon/oncologist. No s/s of lymphedema noted today in L breast or RUE. She remains compliant with intermittent use of RUE compression sleeveand reports good fit, however, has been using regularly and will need updated garment (Jobst Carmen Lite medium regular length 20-30 mm Hg). We repeated LDex bioimpedance testing today with stable score WNL at -1.1, reduced from baseline and stable. Pt has met 3/5 goals. She remains appropriate for continued skilled PT services to address deficits as needed and continue to monitor  bioimpedance. Due to 2 hr drive to reach clinic here she will come in town for bioimpedance in 5 months.  -LB     Rehab Potential Good  -LB     Patient/caregiver participated in establishment of treatment plan and goals Yes  -LB     Patient would benefit from skilled therapy intervention Yes  -LB        PT Plan    PT Frequency Other (comment)  -LB     Predicted Duration of Therapy Intervention (PT) repeat bioimpedance in 5 months  -LB     Planned CPT's? PT EVAL LOW COMPLEXITY: 90280;PT RE-EVAL: 48678;PT THER PROC EA 15 MIN: 67381;PT THER ACT EA 15 MIN: 78666;PT MANUAL THERAPY EA 15 MIN: 21661;PT NEUROMUSC RE-EDUCATION EA 15 MIN: 55228;PT SELF CARE/HOME MGMT/TRAIN EA 15: 32422;PT BIS XTRACELL FLUID ANALYSIS: 96858  -LB     PT Plan Comments repeat biomipedance in December, replacement garment?  -LB               User Key  (r) = Recorded By, (t) = Taken By, (c) = Cosigned By      Initials Name Provider Type    LB Jennifer Toro, PT Physical Therapist                                 Time Calculation:   Start Time: 1445  Stop Time: 1505  Time Calculation (min): 20 min  Total Timed Code Minutes- PT: 10 minute(s)  Timed Charges  58427 - PT Self Care/Mgmt Minutes: 10  Total Minutes  Timed Charges Total Minutes: 10   Total Minutes: 10   Therapy Charges for Today       Code Description Service Date Service Provider Modifiers Qty    71793271358 HC PT BIS XTRACELL FLUID ANALYSIS 7/31/2024 Jennifer Toro, PT  1    48561023872 HC PT SELF CARE/MGMT/TRAIN EA 15 MIN 7/31/2024 Jennifer Toro, PT GP 1                      Jennifer Toro PT  7/31/2024

## 2024-08-01 ENCOUNTER — PRIOR AUTHORIZATION (OUTPATIENT)
Dept: ONCOLOGY | Facility: CLINIC | Age: 49
End: 2024-08-01
Payer: OTHER GOVERNMENT

## 2024-08-02 ENCOUNTER — TELEMEDICINE (OUTPATIENT)
Dept: ONCOLOGY | Facility: CLINIC | Age: 49
End: 2024-08-02
Payer: OTHER GOVERNMENT

## 2024-08-02 DIAGNOSIS — D05.10 DUCTAL CARCINOMA IN SITU (DCIS) OF BREAST, UNSPECIFIED LATERALITY: Primary | ICD-10-CM

## 2024-08-02 PROCEDURE — 99212 OFFICE O/P EST SF 10 MIN: CPT | Performed by: INTERNAL MEDICINE

## 2024-08-02 NOTE — PROGRESS NOTES
Subjective   You have chosen to receive care through a telehealth visit.  Patient was scheduled for video visit but had to be switched to telephone visit as patient states she did not know that she had a video visit and could not connect with her      REASON FOR follow-up:     Pathologic Tis N0 M0, stage 0, newly diagnosed right breast ductal carcinoma in situ, grade 1 ER %, NM %, KI 6710% s/p bilateral mastectomy  Patient was followed with observation with plans to release from our office but complained of severe itching on the lateral side of the reconstructed breast bilaterally which is concerning for her since she has had that prior to her diagnosis of DCIS    HISTORY OF PRESENT ILLNESS:    Patient is 47-year-old female with strong family history of cancer recently diagnosed with DCIS s/p bilateral mastectomy.  She had a INVITAE genetic testing done she is here for the results.  I reviewed the results her 9 gene panel for breast cancer is negative.  However patient has a strong family history of prostate cancer in dad.  Her older sister had renal cell cancer at 50 but  at 52.  Her second sister had breast cancer at age 30 and is alive at age 50.  She had a cousin with leukemia and a cousin with brain tumor.    Initially she had undergone a BRCA testing in  which she said was negative but we do not have the results.  We sent her for INVITAE genetic testing which shows the 9 gene panel for breast cancer is negative.  There is no family history of colon cancer.  She is very anxious about this.    We discussed with her to get counseling from the genetic counselor and hence we will refer her there.      2024: Patient states she has itching bilaterally on the reconstructed breast laterally.  She does not have a rash.  She is a very anxious person and thinks that there is concern for malignancy.  However we tried to reassure her that itching does not [employer] mean malignancy.  But she  complains of tenderness on the medial side of the right reconstructed breast and is concerned of recurrence.  She has seen Rosalio Javed on 2024.  Patient does complain of POTS and dizziness which is chronic.  She lives near [facility] and drives 2-1/2 hours away from here.    She has not lost weight and she has a good appetite but she is very concerned about this itching bilaterally and tenderness on the medial aspect of the right reconstructed breast on the ribs.  I assured her that this is not related to underlying malignancy as she had stage 0 DCIS but she is very concerned about the 10 mg tenderness as well as the itching and we discussed about obtaining a CT scan to confirm there is to reassure there is no concern.  We also discussed about discussing with dermatology for the itching and to Dr. Baer who did the breast reconstruction to see if itching was related to nerve endings and irritation.        2024: Patient had a CT scan and I discussed the results with the patient.  She has narrowing of the ascending colon and radiology recommended a colonoscopy.  Patient sees gastroenterologist Dr. Marian Vera at [facility] and prefers to go to her for colonoscopy.  Her last colonoscopy was 2 years ago according to her.  Patient also has tiny lung nodules 4 mm and will refer her to pulmonary clinic for evaluation continued evaluation.  Patient is very concerned because her sister  of a malignancy recently when she grew very quickly.  She prefers to do the CT scan in 3 months as opposed to 6 months.    Oncologic history  Patient is a 47-year-old female who has had problems with itching on the right breast for quite some time.  She had bilateral diagnostic mammogram with ultrasound by seen on ultrasound.    Patient has a family history of breast cancer in 1 sister who was 52 and  of breast cancer at 54 with liver metastasis.  A second sister had breast cancer at age 30 and is currently  alive at age 50.  She required chemotherapy.  Her maternal aunt's daughter had breast cancer and another maternal aunt had brain tumor in her 50s.  On the paternal side there is leukemia in a cousin at age 50.  Patient sisters have not been tested but patient tells me that her testing was done.  INVITAE genetic test last week, results are pending.  Patient states that she had BRCA testing done in the past by Dr. Adin kenyon in 2014 because of her 2 sisters being diagnosed with breast cancer and her BRCA testing was negative but we do not have the actual report.    Details are as follows    10/12/2022 bilateral diagnostic mammogram with ultrasound:  Finding 1: There is no radiographic abnormality in the region of the pain in the right breast at 9:00.  There are no suspicious masses, calcifications, or areas of architectural distortion.  Finding 2: There is no radiographic abnormality in the region of the pain in the left breast at 6:00.  No solid or suspicious masses are seen.  Remainder of report pending.    October 12, 2022: Bilateral breast ultrasound showed  1.  Simple cyst with circumscribed margins 4 mm at 9 o'clock position which correlates with pain in the right breast  2.  There is no sonographic correlate for the area of pain in the left breast at 6:00.  No solid or suspicious masses seen  3.  There is a oval parallel complicated cyst versus solid mass with circumscribed margins 8 x 5 x 6 mm at the 10:30 position in the right breast 6 cm from the nipple.     11/10/2022 right breast ultrasound-guided biopsy:  The mass in the right breast at 1030 was located.  5 cores were obtained.  A mini cork tissue marker was placed.  Clip was in satisfactory position.  Pathology returned as atypical ductal hyperplasia which is concordant.     11/10/2022 pathology:  Right breast, 1030:  Atypical ductal hyperplasia     12/27/2022 Bilateral Breast MRI   IMPRESSION:  High risk screening breast MR demonstrating no MR findings  of malignancy.   BIRADS: DIAGNOSTIC CATEGORY 1--NEGATIVE.       2/9/2023 Right breast wire localized excisional biopsy  Final Diagnosis   1. Right Breast, Oriented Needle Localization Lumpectomy (11 grams): LOW GRADE DUCTAL CARCINOMA IN SITU (DCIS).  A. Tumor site: 10:30 o'clock.               B. Extent of DCIS: DCIS is multifocal, contiguously spanning approximately 25 mm from medial to lateral.               C. Architecture patterns: Solid and micropapillary.               D. Nuclear grade: Low.               E. Margins: DCIS focally extends to the anterior (3.0 mm focus) and inferior (0.4 mm) margins, is present 1.5 mm       from the superior margin (2.0 mm focus), 2.3 mm from the posterior margin and 5 mm from medial and lateral       margins of excision.  F. No lymph nodes submitted.  G. Hormone receptor status: ER moderately positive, LA strongly positive, Ki67 ~10% (see biomarker template).   H. Pathologic stage: pTis, NX.       3/29/2023 Bilateral breast skin sparing mastectomy with right sentinel lymph node biopsy   Final Diagnosis   1. Breast, Left, Mastectomy: Breast tissue with  A. Atypical ductal hyperplasia.  B. Florid ductal hyperplasia of the usual type.  C. Simple cyst.  D. Secretory change.  E. Duct ectasia.  F. Clustered cysts.     2. Lymph Node, Right [place] Node #1, Excision (2 mm slices):               A. Single benign lymph node.     3. Breast, Right, Mastectomy: Ductal carcinoma in situ.               A. The ductal carcinoma in situ is of low to intermediate nuclear grade with solid and cribriform architecture and        focal necrosis.   B. The ductal carcinoma in situ measures up to 40 mm  (4 consecutive 1 cm slices).  C. The ductal carcinoma in situ comes within 2.5 mm of the posterior margin, 10 mm of the medial margin,       16 mm of the anterior margin, 75 mm of the inferior margin, 85 mm of the superior and 130 mm of the lateral         margin.  D. Microcalcifications are not  associated with the ductal carcinoma in situ.  E. Calcifications are present with non-neoplastic tissue.  F. Foci of atypical ductal hyperplasia  G. Lumpectomy site changes are present with the ductal carcinoma in situ surrounding the areas of the        lumpectomy site.     Comment: The case has been previously staged [id number] that will not be repeated as only residual ductal carcinoma in situ is identified. It was originally staged as Pathologic stage pTis, NX. Given the additional lymph nodes it is pTis, pN0. Hormone receptors were performed on prior tissue and documented in case [id number] which are ER moderate positivity, MT strongly positive, Ki-67 10%. The ER was 81-90% positive. The MT was % positive.     4. Lymph Node, Right [place] Node #2, Excision (2 mm slices):               A. Single benign lymph node.       Patient is healing from surgery and referred here for evaluation following bilateral mastectomy with reconstruction      Past Medical History:   Diagnosis Date    Abnormal ECG May 2016    Anxiety     Asthma     Breast atypical hyperplasia     Breast cancer 1/2023    Cholelithiasis 8/2022    Pain on my right side    Chronic kidney disease     hx of kidney stones    Coronary artery disease May 2026    POTS    DDD (degenerative disc disease), lumbar     Depression     Ductal carcinoma in situ (DCIS) of right breast 02/28/2023    GERD (gastroesophageal reflux disease)     History of ear surgery     Hx of hysterectomy 06/12/2018    Hypertension     Hypoglycemia     Migraines     Monoclonal (M) protein disease, 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    PONV (postoperative nausea and vomiting) 2006    Torn acl repair work up from surgery very nauseous    POTS (postural orthostatic tachycardia syndrome)     Rectal bleeding 10/2021    Due to constipation    S/P ACL surgery     S/P removal of ovarian cyst     Tattoos     Thyroid nodule         Past Surgical History:   Procedure Laterality Date    ABLATION OF  DYSRHYTHMIC FOCUS  2021    [facility]    BREAST AUGMENTATION Bilateral 2023    BREAST BIOPSY  Oct 2022    Need surgery right    BREAST LUMPECTOMY Right 2023    Procedure: Right breast wire localized excisional biopsy;  Surgeon: Dasia Marroquin MD;  Location: [facility];  Service: General;  Laterality: Right;    BREAST RECONSTRUCTION Bilateral 2023    Procedure: BILATERAL IMMEDIATE BREAST  RECONSTRUCTION  WITH TISSUE EXPANDERS AND BIOLOGIC;  Surgeon: Georgia Baer MD;  Location: [facility];  Service: Plastics;  Laterality: Bilateral;    BREAST SURGERY  23    Found cancer    CARDIAC CATHETERIZATION  2021    CARDIOVASCULAR STRESS TEST  2018    R.Stress- 6 Min. 46 secs. 7.0 METS. 76% THR. BP- 121/73. Pt had CP. Inconclusive test    CARDIOVASCULAR STRESS TEST  10/17/2018    L. Cardiolite- Negative.    CARDIOVASCULAR STRESS TEST  2022    Lexiscan- EF > 70%. Negative     SECTION      X 3    CHOLECYSTECTOMY      CHOLECYSTECTOMY  2023    CONVERTED (HISTORICAL) HOLTER  2017    @[facility].AVG HR 98 BPM    CONVERTED (HISTORICAL) HOLTER  2021    <8 Days. .. 1 run of SVT    CONVERTED (HISTORICAL) HOLTER  2021    Baseline sinus tach, AVG , current advised, follow with EP    ECHO - CONVERTED  2017    @[facility]. EF 60%. Mild AI    ECHO - CONVERTED  2017    EF 60%. MIld- Mod AI. No Shunt    ECHO - CONVERTED  2018    EF 55%. Mild AI & MR. RVSP- 26 mmHg.    ECHO - CONVERTED  2020    @ [facility]. EF 60%. Mild MR & AI. No AS. RVSP- 26 mmHg    FOOT SURGERY Right     HAND SURGERY Right     KNEE ACL RECONSTRUCTION Left     LAPAROSCOPIC TUBAL LIGATION      MASTECTOMY W/ SENTINEL NODE BIOPSY Right 2023    Procedure: bilateral skin sparing mastectomy, right sentinel lymph node biopsy;  Surgeon: Dasia Marroquin MD;  Location: [facility];  Service: General;  Laterality: Right;    OTHER SURGICAL HISTORY  07/10/2017     Extended Monitor- AVG HR 81 BPM. [place] an Syncope with no EKG changes    OTHER SURGICAL HISTORY  09/18/2018    Tilt Table- Positive.    OTHER SURGICAL HISTORY  05/09/2019    MCOT:  High , low 51, AVG 82, sinus noted, rare PVC, no arrythymia    RIGHT OOPHORECTOMY      STAPEDECTOMY      US CAROTID UNILATERAL  04/09/2019    Jensen:  No flow limiting stenosis bilaterally        Current Outpatient Medications on File Prior to Visit   Medication Sig Dispense Refill    ALBUTEROL SULFATE HFA IN Inhale Every 4 (Four) Hours As Needed.      bisoprolol (ZEBeta) 5 MG tablet TAKE ONE-HALF (1/2) TABLET EVERY NIGHT 90 tablet 3    butalbital-acetaminophen-caffeine (FIORICET, ESGIC) -40 MG per tablet Take 1 tablet by mouth Every 4 (Four) Hours As Needed for Headache.      cyclobenzaprine (FLEXERIL) 10 MG tablet Take 2 tablets by mouth Daily. 2 tablets daily      diazePAM (VALIUM) 2 MG tablet Take 1 tablet by mouth 3 (Three) Times a Day.      diclofenac (VOLTAREN) 50 MG EC tablet Take 1 tablet by mouth 2 (Two) Times a Day. HOLD PER MD INSTRUCTIONS PRIOR TO SURGERY      Diclofenac Sodium (VOLTAREN) 1 % gel gel Apply 4 g topically to the appropriate area as directed Daily.      dilTIAZem SR (CARDIZEM SR) 60 MG 12 hr capsule Take 1 tablet by mouth every day and can take 1 extra tablet daily as needed for palpitations 180 capsule 3    Erenumab-aooe (AIMOVIG, 140 MG DOSE, SC) Inject 1 dose under the skin into the appropriate area as directed Every 30 (Thirty) Days.      fluconazole (DIFLUCAN) 200 MG tablet Take 1 tablet by mouth Daily.      fludrocortisone 0.1 MG tablet Take 2 tablets by mouth Every Morning. 90 tablet 3    fluticasone (FLONASE) 50 MCG/ACT nasal spray 2 sprays into the nostril(s) as directed by provider Every Night.      ketorolac (TORADOL) 10 MG tablet Take 1 tablet by mouth 2 (Two) Times a Day As Needed. for pain      linaclotide (LINZESS) 290 MCG capsule capsule Take 1 capsule by mouth Every Morning Before  Breakfast.      Loratadine 10 MG capsule Take 1 capsule by mouth Daily.      methocarbamol (ROBAXIN) 750 MG tablet Take 1 tablet by mouth Every 4 (Four) Hours.      metoclopramide (REGLAN) 10 MG tablet Take 1 tablet by mouth 2 (Two) Times a Day Before Meals.      NON FORMULARY Epidural-Bupivicane-lidocaine injections every 3 months      NON FORMULARY 125 mg Daily As Needed. Sodium tablets      omeprazole (priLOSEC) 40 MG capsule Take 1 capsule by mouth Daily.      OnabotulinumtoxinA (BOTOX IJ) Inject  as directed Every 3 (Three) Months.      Spiriva Respimat 1.25 MCG/ACT aerosol solution inhaler Inhale 2 puffs Daily.      topiramate (TOPAMAX) 50 MG tablet Take 1.5 tablets by mouth 2 (Two) Times a Day. 1 1/2 tablets twice a day      traZODone (DESYREL) 50 MG tablet Take 1 tablet by mouth Every Night.       No current facility-administered medications on file prior to visit.        ALLERGIES:    Allergies   Allergen Reactions    Other Anaphylaxis and Hives     Soma    Carbamates Hives    Carisoprodol Hives    Triptans Hives        Social History     Socioeconomic History    Marital status:      Spouse name: Darien    Number of children: 5   Tobacco Use    Smoking status: Never     Passive exposure: Never    Smokeless tobacco: Never   Vaping Use    Vaping status: Never Used   Substance and Sexual Activity    Alcohol use: Yes     Alcohol/week: 2.0 standard drinks of alcohol     Types: 2 Drinks containing 0.5 oz of alcohol per week     Comment: Maybe once a month if we go out to dinner    Drug use: No    Sexual activity: Yes     Partners: Male     Birth control/protection: None, Tubal ligation, Hysterectomy     Comment: Nov 2018 Hysterectomy was done leaving one overy left        Family History   Problem Relation Age of Onset    Diabetes Mother     Arthritis Father     Asthma Father         Covid related    Breast cancer Sister     Cancer Sister         Liver 2020 passed September 2022    Early death Sister          Liver cancer  2022    Hearing loss Sister     No Known Problems Sister     Cancer Sister         Breast right     Miscarriages / Stillbirths Daughter         Miscarriage/ stillbirth    Heart disease Son         Caused by Kawasaki disease    Heart attack Son     Autism Son     Learning disabilities Son         Autism    Miscarriages / Stillbirths Son         Misscarriage/ stillbirth    Heart attack Paternal Grandfather         Heart Attack    Heart disease Paternal Grandfather     Malig Hyperthermia Neg Hx       OB/GYN history: Age of menstruation  13  Age of menopause in 2016 she had a hysterectomy with 1 ovary being removed  She is  7 para 5 2 miscarriages.  She has not been on birth control pills      Review of Systems   Constitutional:  Negative for appetite change, chills, diaphoresis, fatigue, fever and unexpected weight change.   HENT:  Negative for hearing loss, sore throat and trouble swallowing.    Respiratory:  Negative for cough, chest tightness, shortness of breath and wheezing.    Cardiovascular:  Negative for chest pain, palpitations and leg swelling.   Gastrointestinal:  Negative for abdominal distention, abdominal pain, constipation, diarrhea, nausea and vomiting.   Genitourinary:  Negative for dysuria, frequency, hematuria and urgency.   Musculoskeletal:  Negative for joint swelling.        No muscle weakness.   Skin:  Negative for rash and wound.   Neurological:  Negative for seizures, syncope, speech difficulty, weakness, numbness and headaches.   Hematological:  Negative for adenopathy. Does not bruise/bleed easily.   Psychiatric/Behavioral:  Negative for behavioral problems, confusion and suicidal ideas.         Objective     There were no vitals filed for this visit.          2024    10:00 AM   Current Status   ECOG score 0       Physical Exam    This is a video visit converted to telephone visit.        RECENT LABS:  Hematology WBC   Date Value Ref Range Status    07/12/2024 6.56 3.40 - 10.80 10*3/mm3 Final   02/25/2021 3.81 3.70 - 11.00 k/uL Final   08/31/2018 26 to 100 0 - 5 /hpf Final     Comment:     Culture indicated, results to follow.     RBC   Date Value Ref Range Status   07/12/2024 4.36 3.77 - 5.28 10*6/mm3 Final   02/25/2021 4.15 3.90 - 5.20 m/uL Final     Hemoglobin   Date Value Ref Range Status   07/12/2024 13.9 12.0 - 15.9 g/dL Final   02/25/2021 12.1 11.5 - 15.5 g/dL Final     Hematocrit   Date Value Ref Range Status   07/12/2024 42.1 34.0 - 46.6 % Final   02/25/2021 38.0 36.0 - 46.0 % Final     Platelets   Date Value Ref Range Status   07/12/2024 247 140 - 450 10*3/mm3 Final   02/25/2021 255 150 - 400 k/uL Final          Assessment & Plan     #. Pathologic Tis N0 M0, stage 0, newly diagnosed right breast ductal carcinoma in situ, grade 1 ER %, UT %, RAFAEL 6710%  Initially had symptoms of itching in the right breast  Bilateral diagnostic mammogram and ultrasound on October 2022 showed that the bilateral diagnostic mammogram was negative but the ultrasound showed a lesion at 10:30 position which could be cyst versus solid mass  Ultrasound-guided biopsy of the mass showed atypical ductal hyperplasia  February 9, 2022: Right breast excisional biopsy showed ductal carcinoma in situ, multifocal  S/p bilateral mastectomy with sentinel lymph nodes on the right sentinel lymph node surgery showed left breast was showing atypical ductal hyperplasia but right breast showed evidence of ductal carcinoma in situ.  40 mm ER/UT positive s/p bilateral men mastectomy and reconstruction  Patient does not require any endocrine therapy given that she has had bilateral mastectomy  INVITAE genetic testing has been done and we will await the results of that  Patient lives in [facility] and has obtained BRCA testing at initial hospital which apparently was negative and was done by Dr. Adin Boyd   Patient is s/p bilateral mastectomy for DCIS and does not require any  endocrine therapy  2023: INVITAE genetic test negative for 9 gene panel.  Patient's concern for malignancy is very high as there is strong family history of different cancers.  I discussed with her to go for counseling with genetics counselor.  2024: We had discussed with her about releasing her from our office but patient concerned about being released and she is now complaining of severe bilateral skin itching though we do not see any rash.  She is concerned it could be mean recurrence of underlying malignancy or a new malignancy.  I assured her that DCIS usually does not present that way.  She also complains of pain on the medial aspect of the right reconstructed breast given her concern we will obtain a CT scan to reassure that there is no concern.  It is potential it could be the itching could be related to irritation of the nerve endings at surgery but I am unsure.  She is going to discuss with Dr. Baer  2024: Reviewed CT scan and patient has narrowing of the ascending colon, colonoscopy was suggested.  Patient has had colonoscopy 2 years ago by Dr. Marian Vera at [facility]  refer patient to her.  Patient also has 4 mm lung nodules for which referral will be made to pulmonary.  Given her sister  of malignancy recently very quickly patient prefers to get CT scan done within 3 months as opposed to 6 months    #.  Family history of breast cancer  Patient's sister had renal cell at age 50 and  of renal cell cancer at age 52 with liver metastasis  Patient had a second sister who had breast cancer at age 30 and required chemotherapy and still alive at 50  Maternal cousin had brain cancer and paternal cousin had leukemia around 50  Patient had BRCA testing in  but did not have the results  INVITAE genetic test was done in Dr. Marroquin's office  INVITAE genetic test negative for 9 gene panel    #.  Pain at the left chest wall site at the left axillary region likely  secondary to surgery but no evidence of infection    #.  Lung nodule seen on CT chest    #.  Narrowing of the ascending colon, unsure of the cause will likely require colonoscopy    Plan  She is s/p bilateral mastectomy for her DCIS and does not need any treatment and could be released from here  However given her anxiety I will refer her to genetics counselor  Given complaints of severe itching bilaterally and pain on the medial aspect of the right reconstructed breast will obtain a CT scan  CT scan reviewed which shows 4 mm lung nodules and a follow-up CT suggested in 3 to 6 months as well as narrowing of the ascending colon for which colonoscopy suggested  Will refer to Dr. Marian Vera at [facility] gastroenterology in 3 to 4 weeks to consider colonoscopy  Refer to Dr. Olvin Mendez to follow-up on the lung nodules  Follow-up with Dr. Solano in 4 months prior to which we will obtain a CT scan to follow-up on this lung nodules      I spent 20 total minutes, face-to-face, caring for Dolores today. Greater than 50% of this time involved counseling and/or coordination of care as documented within this note.   MD Dr. Liliya Parker

## 2024-08-21 ENCOUNTER — TRANSCRIBE ORDERS (OUTPATIENT)
Dept: ADMINISTRATIVE | Facility: HOSPITAL | Age: 49
End: 2024-08-21
Payer: OTHER GOVERNMENT

## 2024-08-21 DIAGNOSIS — M25.612 DECREASED ROM OF LEFT SHOULDER: ICD-10-CM

## 2024-08-21 DIAGNOSIS — Z91.89 AT RISK FOR LYMPHEDEMA: ICD-10-CM

## 2024-08-21 DIAGNOSIS — Z85.3 HX OF BREAST CANCER: Primary | ICD-10-CM

## 2024-08-21 DIAGNOSIS — Z90.13 STATUS POST BILATERAL MASTECTOMY: Primary | ICD-10-CM

## 2024-08-21 DIAGNOSIS — M25.611 DECREASED ROM OF RIGHT SHOULDER: ICD-10-CM

## 2024-08-29 RX ORDER — FLUDROCORTISONE ACETATE 0.1 MG/1
0.2 TABLET ORAL DAILY
Qty: 180 TABLET | Refills: 2 | Status: SHIPPED | OUTPATIENT
Start: 2024-08-29

## 2024-08-29 NOTE — TELEPHONE ENCOUNTER
Called and spoke to patient. Per our last office note and med list, pt is taking 0.2 mg daily but refill request came through for 0.1mg daily. Pt states this was the only option on [employer] website to refill but she has been taking the 0.2mg dose.

## 2024-11-11 ENCOUNTER — OFFICE VISIT (OUTPATIENT)
Dept: CARDIOLOGY | Facility: CLINIC | Age: 49
End: 2024-11-11
Payer: OTHER GOVERNMENT

## 2024-11-11 VITALS
HEIGHT: 64 IN | BODY MASS INDEX: 31.79 KG/M2 | DIASTOLIC BLOOD PRESSURE: 90 MMHG | HEART RATE: 88 BPM | WEIGHT: 186.2 LBS | SYSTOLIC BLOOD PRESSURE: 118 MMHG

## 2024-11-11 DIAGNOSIS — I49.5 TACHY-BRADY SYNDROME: ICD-10-CM

## 2024-11-11 DIAGNOSIS — R07.9 CHEST PAIN, UNSPECIFIED TYPE: Primary | ICD-10-CM

## 2024-11-11 DIAGNOSIS — R00.2 PALPITATIONS: ICD-10-CM

## 2024-11-11 DIAGNOSIS — R00.0 TACHYCARDIA: ICD-10-CM

## 2024-11-11 DIAGNOSIS — R42 DIZZINESS: ICD-10-CM

## 2024-11-11 PROCEDURE — 99214 OFFICE O/P EST MOD 30 MIN: CPT | Performed by: NURSE PRACTITIONER

## 2024-11-11 RX ORDER — DILTIAZEM HYDROCHLORIDE 60 MG/1
CAPSULE, EXTENDED RELEASE ORAL
Qty: 180 CAPSULE | Refills: 3 | Status: SHIPPED | OUTPATIENT
Start: 2024-11-11

## 2024-11-11 RX ORDER — BISOPROLOL FUMARATE 5 MG/1
5 TABLET, FILM COATED ORAL NIGHTLY
Qty: 90 TABLET | Refills: 3 | Status: SHIPPED | OUTPATIENT
Start: 2024-11-11

## 2024-11-11 NOTE — PROGRESS NOTES
Chief Complaint   Patient presents with    Follow-up     Cardiac management    Lab     Last labs 9/12/24 with Dr Marroquin.    Heart rate     Has been elevated, 114 with walking at normal pace. Per apple watch heart rate , she has been getting message per watch that she may have Afib. She has also noticed elevated heart rate with hot flashes. She has not been able to take medication for hot flashes related to hx of breast cancer. Has a lot of nausea associated with elevated heart rate. She has concerns that she may need increase dose of bisoprolol.    Chest Pain     Has chest tightness with elevated heart rate. Has occasional radiating pain to left arm.    Med Refill     Needs refills on Diltiazem SR-90 day        HPI:  HPI   Dolores Dejesus is a 49 y.o. female with anxiety, POTS, palpitations, migraines, and syncope.She underwent multiple investigations including echocardiogram, Holter monitor, stress test, and a Tilt table.  She had normal LV systolic function, mild valvular regurgitation and no ischemia.  The tilt table was positive for hypotension and near syncopal episode. Symptoms persisted and MCOT ordered. Results showed an average heart rate of 82 with no arrythmia and several instances of sinus tachycardia for which patient was symptomatic.  She was advised to add metoprolol prn but became symptomatic with medication. It was then changed to CCB (diltiazem 30mg)  to be used prn if palpitations should occur. Carotid US was negative.  In April 2020 she saw Dr. Martínez.  He recommended referral to autonomic dysfunction clinic, specifically  at [facility]. She was seen at [facility] in October and underwent Extensive workup was done including normal echocardiogram, 26 day cardiac telemetry in which symptoms correlated with sinus tach, and unremarkable carotid ultrasound. Neuro cardio autonomic test panel reported no evidence of orthostatic tachycardia or orthostatic tachycardia or hypotension.   Workup  EF 70%, no ischemic burden.  Remains followed by EP for autonomic dysfunction. Florinef and BB have been continued. She reports right breast cancer with bilateral mastectomy and reconstruction since last visit, followed by oncology.      She comes today for follow up.  She states she had labs with PCP 2024 not available for review today last labs available 2024 showed normal CBC and normal CMP other than elevated fasting glucose 104.  She states her heart rate has been elevated up to 114 when walking at a normal pace and per her Apple Watch 41-1 43.  She has been getting messages from her Apple Watch stating she is in A-fib she is perimenopausal and having hot flashes and notices elevated heart rate during hot flashes.  She is having some chest tightness when her heart rate is elevated and it radiates to the left arm.    Cardiac History:    Past Surgical History:   Procedure Laterality Date    ABLATION OF DYSRHYTHMIC FOCUS  2021    [facility]    BREAST AUGMENTATION Bilateral 2023    BREAST BIOPSY  Oct 2022    Need surgery right    BREAST LUMPECTOMY Right 2023    Procedure: Right breast wire localized excisional biopsy;  Surgeon: Dasia Marroquin MD;  Location: [facility];  Service: General;  Laterality: Right;    BREAST RECONSTRUCTION Bilateral 2023    Procedure: BILATERAL IMMEDIATE BREAST  RECONSTRUCTION  WITH TISSUE EXPANDERS AND BIOLOGIC;  Surgeon: Georgia Baer MD;  Location: [facility];  Service: Plastics;  Laterality: Bilateral;    BREAST SURGERY  23    Found cancer    CARDIAC CATHETERIZATION  2021    CARDIOVASCULAR STRESS TEST  2018    R.Stress- 6 Min. 46 secs. 7.0 METS. 76% THR. BP- 121/73. Pt had CP. Inconclusive test    CARDIOVASCULAR STRESS TEST  10/17/2018    L. Cardiolite- Negative.    CARDIOVASCULAR STRESS TEST  2022    Lexiscan- EF > 70%. Negative     SECTION      X 3    CHOLECYSTECTOMY      CHOLECYSTECTOMY   04/2023    CONVERTED (HISTORICAL) HOLTER  06/01/2017    @[facility].AVG HR 98 BPM    CONVERTED (HISTORICAL) HOLTER  08/25/2021    <8 Days. .. 1 run of SVT    CONVERTED (HISTORICAL) HOLTER  07/30/2021    Baseline sinus tach, AVG , current advised, follow with EP    ECHO - CONVERTED  06/14/2017    @[facility]. EF 60%. Mild AI    ECHO - CONVERTED  06/22/2017    EF 60%. MIld- Mod AI. No Shunt    ECHO - CONVERTED  08/21/2018    EF 55%. Mild AI & MR. RVSP- 26 mmHg.    ECHO - CONVERTED  08/03/2020    @ [facility]. EF 60%. Mild MR & AI. No AS. RVSP- 26 mmHg    FOOT SURGERY Right     HAND SURGERY Right     KNEE ACL RECONSTRUCTION Left     LAPAROSCOPIC TUBAL LIGATION      MASTECTOMY W/ SENTINEL NODE BIOPSY Right 03/29/2023    Procedure: bilateral skin sparing mastectomy, right sentinel lymph node biopsy;  Surgeon: Dasia Marroquin MD;  Location: [facility];  Service: General;  Laterality: Right;    OTHER SURGICAL HISTORY  07/10/2017    Extended Monitor- AVG HR 81 BPM. [place] an Syncope with no EKG changes    OTHER SURGICAL HISTORY  09/18/2018    Tilt Table- Positive.    OTHER SURGICAL HISTORY  05/09/2019    MCOT:  High , low 51, AVG 82, sinus noted, rare PVC, no arrythymia    RIGHT OOPHORECTOMY      STAPEDECTOMY      US CAROTID UNILATERAL  04/09/2019    Jensen:  No flow limiting stenosis bilaterally       Current Outpatient Medications   Medication Sig Dispense Refill    ALBUTEROL SULFATE HFA IN Inhale Every 4 (Four) Hours As Needed.      bisoprolol (ZEBeta) 5 MG tablet Take 1 tablet by mouth Every Night. 90 tablet 3    butalbital-acetaminophen-caffeine (FIORICET, ESGIC) -40 MG per tablet Take 1 tablet by mouth Every 4 (Four) Hours As Needed for Headache.      cyclobenzaprine (FLEXERIL) 10 MG tablet Take 2 tablets by mouth Daily.      diazePAM (VALIUM) 2 MG tablet Take 1 tablet by mouth 3 (Three) Times a Day.      diclofenac (VOLTAREN) 50 MG EC tablet Take 1 tablet by mouth 2 (Two) Times a Day. HOLD PER MD  INSTRUCTIONS PRIOR TO SURGERY      Diclofenac Sodium (VOLTAREN) 1 % gel gel Apply 4 g topically to the appropriate area as directed Daily.      dilTIAZem SR (CARDIZEM SR) 60 MG 12 hr capsule Take 1 tablet by mouth every day and can take 1 extra tablet daily as needed for palpitations 180 capsule 3    Erenumab-aooe (AIMOVIG, 140 MG DOSE, SC) Inject 1 dose under the skin into the appropriate area as directed Every 30 (Thirty) Days.      fludrocortisone 0.1 MG tablet Take 2 tablets by mouth Daily. 180 tablet 2    fluticasone (FLONASE) 50 MCG/ACT nasal spray Administer 2 sprays into the nostril(s) as directed by provider Every Night.      ketorolac (TORADOL) 10 MG tablet Take 1 tablet by mouth 2 (Two) Times a Day As Needed. for pain      Loratadine 10 MG capsule Take 1 capsule by mouth Daily.      methocarbamol (ROBAXIN) 750 MG tablet Take 1 tablet by mouth Every 4 (Four) Hours.      metoclopramide (REGLAN) 10 MG tablet Take 1 tablet by mouth 2 (Two) Times a Day Before Meals.      NON FORMULARY Epidural-Bupivicane-lidocaine injections every 3 months      NON FORMULARY 125 mg Daily As Needed. Sodium tablets      omeprazole (priLOSEC) 40 MG capsule Take 1 capsule by mouth Daily.      OnabotulinumtoxinA (BOTOX IJ) Inject  as directed Every 3 (Three) Months.      Spiriva Respimat 1.25 MCG/ACT aerosol solution inhaler Inhale 2 puffs Daily.      topiramate (TOPAMAX) 50 MG tablet Take 1.5 tablets by mouth 2 (Two) Times a Day.      traZODone (DESYREL) 50 MG tablet Take 1 tablet by mouth Every Night.       No current facility-administered medications for this visit.       Other, Carbamates, Carisoprodol, and Triptans    Past Medical History:   Diagnosis Date    Abnormal ECG May 2016    Anxiety     Asthma     Breast atypical hyperplasia     Breast cancer 1/2023    Cholelithiasis 8/2022    Pain on my right side    Chronic kidney disease     hx of kidney stones    Coronary artery disease May 2026    POTS    DDD (degenerative disc  disease), lumbar     Depression     Ductal carcinoma in situ (DCIS) of right breast 2023    GERD (gastroesophageal reflux disease)     History of ear surgery     Hx of hysterectomy 2018    Hypertension     Hypoglycemia     Migraines     Monoclonal (M) protein disease, 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    PONV (postoperative nausea and vomiting) 2006    Torn acl repair work up from surgery very nauseous    POTS (postural orthostatic tachycardia syndrome)     Rectal bleeding 10/2021    Due to constipation    S/P ACL surgery     S/P removal of ovarian cyst     Tattoos     Thyroid nodule        Social History     Socioeconomic History    Marital status:      Spouse name: Darien    Number of children: 5   Tobacco Use    Smoking status: Never     Passive exposure: Never    Smokeless tobacco: Never   Vaping Use    Vaping status: Never Used   Substance and Sexual Activity    Alcohol use: Yes     Alcohol/week: 2.0 standard drinks of alcohol     Types: 2 Drinks containing 0.5 oz of alcohol per week     Comment: Maybe once a month if we go out to dinner    Drug use: No    Sexual activity: Yes     Partners: Male     Birth control/protection: None, Tubal ligation, Hysterectomy     Comment: 2018 Hysterectomy was done leaving one overy left       Family History   Problem Relation Age of Onset    Diabetes Mother     Arthritis Father     Asthma Father         Covid related    Breast cancer Sister     Cancer Sister         Liver 2020 passed 2022    Early death Sister         Liver cancer  2022    Hearing loss Sister     No Known Problems Sister     Cancer Sister         Breast right     Miscarriages / Stillbirths Daughter         Miscarriage/ stillbirth    Heart disease Son         Caused by Kawasaki disease    Heart attack Son     Autism Son     Learning disabilities Son         Autism    Miscarriages / Stillbirths Son         Misscarriage/ stillbirth    Heart attack Paternal Grandfather        "  Heart Attack    Heart disease Paternal Grandfather     [place] Hyperthermia Neg Hx        Vitals:   /90 (BP Location: Left arm, Patient Position: Sitting)   Pulse 88   Ht 162.6 cm (64.02\")   Wt 84.5 kg (186 lb 3.2 oz)   BMI 31.95 kg/m²     Physical Exam  Vitals and nursing note reviewed.   Constitutional:       Appearance: She is obese.   Neck:      Vascular: No carotid bruit.   Cardiovascular:      Rate and Rhythm: Normal rate and regular rhythm.      Pulses: Normal pulses.      Heart sounds: Normal heart sounds. No murmur heard.     No friction rub. No gallop.   Pulmonary:      Effort: Pulmonary effort is normal.      Breath sounds: Normal breath sounds and air entry.   Musculoskeletal:      Right lower leg: No edema.      Left lower leg: No edema.   Skin:     General: Skin is warm and dry.      Capillary Refill: Capillary refill takes less than 2 seconds.   Neurological:      Mental Status: She is alert and oriented to person, place, and time.       Procedures     Assessment & Plan     Diagnoses and all orders for this visit:    1. Chest pain, unspecified type (Primary)  -     Stress Test With Myocardial Perfusion One Day; Future    2. Tachycardia  -     dilTIAZem SR (CARDIZEM SR) 60 MG 12 hr capsule; Take 1 tablet by mouth every day and can take 1 extra tablet daily as needed for palpitations  Dispense: 180 capsule; Refill: 3  -     bisoprolol (ZEBeta) 5 MG tablet; Take 1 tablet by mouth Every Night.  Dispense: 90 tablet; Refill: 3    3. Palpitations  -     dilTIAZem SR (CARDIZEM SR) 60 MG 12 hr capsule; Take 1 tablet by mouth every day and can take 1 extra tablet daily as needed for palpitations  Dispense: 180 capsule; Refill: 3  -     bisoprolol (ZEBeta) 5 MG tablet; Take 1 tablet by mouth Every Night.  Dispense: 90 tablet; Refill: 3    4. Dizziness  -     Stress Test With Myocardial Perfusion One Day; Future    5. Tachy-semaj syndrome  -     dilTIAZem SR (CARDIZEM SR) 60 MG 12 hr capsule; Take 1 " tablet by mouth every day and can take 1 extra tablet daily as needed for palpitations  Dispense: 180 capsule; Refill: 3  -     bisoprolol (ZEBeta) 5 MG tablet; Take 1 tablet by mouth Every Night.  Dispense: 90 tablet; Refill: 3    Chest pain/dizziness  - Repeat stress test to evaluate for ischemia    Tachycardia/palpitations/tachybradycardia syndrome  - Continue diltiazem And bisoprolol  - If heart rate is elevated it is okay to take an extra bisoprolol    Recommend repeating stress test.  No medication changes made today.  Refills for bisoprolol and diltiazem sent    Visit Diagnoses:    ICD-10-CM ICD-9-CM   1. Chest pain, unspecified type  R07.9 786.50   2. Tachycardia  R00.0 785.0   3. Palpitations  R00.2 785.1   4. Dizziness  R42 780.4   5. Tachy-semaj syndrome  I49.5 427.81       Meds Ordered During Visit:  New Medications Ordered This Visit   Medications    dilTIAZem SR (CARDIZEM SR) 60 MG 12 hr capsule     Sig: Take 1 tablet by mouth every day and can take 1 extra tablet daily as needed for palpitations     Dispense:  180 capsule     Refill:  3    bisoprolol (ZEBeta) 5 MG tablet     Sig: Take 1 tablet by mouth Every Night.     Dispense:  90 tablet     Refill:  3       Follow Up Appointment:   Return in about 6 months (around 5/11/2025), or if symptoms worsen or fail to improve.           This document has been electronically signed by GIDEON Patel  November 13, 2024 16:01 EST    Dictated Utilizing Dragon Dictation: Part of this note may be an electronic transcription/translation of spoken language to printed text using the Dragon Dictation System.

## 2024-11-26 ENCOUNTER — HOSPITAL ENCOUNTER (OUTPATIENT)
Dept: PET IMAGING | Facility: HOSPITAL | Age: 49
Discharge: HOME | End: 2024-11-26
Admitting: INTERNAL MEDICINE
Payer: OTHER GOVERNMENT

## 2024-11-26 DIAGNOSIS — D05.10 DUCTAL CARCINOMA IN SITU (DCIS) OF BREAST, UNSPECIFIED LATERALITY: ICD-10-CM

## 2024-11-26 PROCEDURE — 25510000001 IOPAMIDOL 61 % SOLUTION: Performed by: INTERNAL MEDICINE

## 2024-11-26 PROCEDURE — 74177 CT ABD & PELVIS W/CONTRAST: CPT

## 2024-11-26 PROCEDURE — 25510000002 DIATRIZOATE MEGLUMINE & SODIUM PER 1 ML: Performed by: INTERNAL MEDICINE

## 2024-11-26 PROCEDURE — 71260 CT THORAX DX C+: CPT

## 2024-11-26 RX ORDER — DIATRIZOATE MEGLUMINE AND DIATRIZOATE SODIUM 660; 100 MG/ML; MG/ML
30 SOLUTION ORAL; RECTAL
Status: COMPLETED | OUTPATIENT
Start: 2024-11-26 | End: 2024-11-26

## 2024-11-26 RX ORDER — IOPAMIDOL 612 MG/ML
100 INJECTION, SOLUTION INTRAVASCULAR
Status: COMPLETED | OUTPATIENT
Start: 2024-11-26 | End: 2024-11-26

## 2024-11-26 RX ADMIN — IOPAMIDOL 85 ML: 612 INJECTION, SOLUTION INTRAVENOUS at 12:34

## 2024-11-26 RX ADMIN — DIATRIZOATE MEGLUMINE AND DIATRIZOATE SODIUM 30 ML: 660; 100 LIQUID ORAL; RECTAL at 12:34

## 2024-12-02 NOTE — PROGRESS NOTES
Chief Complaint   Patient presents with    Follow-up     2024, interval history:  Patient new to me.  Previous Dr. Anderson patient.  She recently had MRI breast on 2024 which was unremarkable.  She had CT chest abdomen pelvis in late 2024.  She is here to review them.  She denies any new concerns or complaints        Oncologic history  Patient is a 47-year-old female who has had problems with itching on the right breast for quite some time.  She had bilateral diagnostic mammogram with ultrasound by seen on ultrasound.    Patient has a family history of breast cancer in 1 sister who was 52 and  of breast cancer at 54 with liver metastasis.  A second sister had breast cancer at age 30 and is currently alive at age 50.  She required chemotherapy.  Her maternal aunt's daughter had breast cancer and another maternal aunt had brain tumor in her 50s.  On the paternal side there is leukemia in a cousin at age 50.  Patient sisters have not been tested but patient tells me that her testing was done.  [facility] genetic test last week, results are pending.  Patient states that she had BRCA testing done in the past by Dr. Adin kenyon in  because of her 2 sisters being diagnosed with breast cancer and her BRCA testing was negative but we do not have the actual report.    Details are as follows    10/12/2022 bilateral diagnostic mammogram with ultrasound:  Finding 1: There is no radiographic abnormality in the region of the pain in the right breast at 9:00.  There are no suspicious masses, calcifications, or areas of architectural distortion.  Finding 2: There is no radiographic abnormality in the region of the pain in the left breast at 6:00.  No solid or suspicious masses are seen.  Remainder of report pending.    2022: Bilateral breast ultrasound showed  1.  Simple cyst with circumscribed margins 4 mm at 9 o'clock position which correlates with pain in the right breast  2.  There is  no sonographic correlate for the area of pain in the left breast at 6:00.  No solid or suspicious masses seen  3.  There is a oval parallel complicated cyst versus solid mass with circumscribed margins 8 x 5 x 6 mm at the 10:30 position in the right breast 6 cm from the nipple.     11/10/2022 right breast ultrasound-guided biopsy:  The mass in the right breast at 1030 was located.  5 cores were obtained.  A mini cork tissue marker was placed.  Clip was in satisfactory position.  Pathology returned as atypical ductal hyperplasia which is concordant.     11/10/2022 pathology:  Right breast, 1030:  Atypical ductal hyperplasia     12/27/2022 Bilateral Breast MRI   IMPRESSION:  High risk screening breast MR demonstrating no MR findings of malignancy.   BIRADS: DIAGNOSTIC CATEGORY 1--NEGATIVE.       2/9/2023 Right breast wire localized excisional biopsy  Final Diagnosis   1. Right Breast, Oriented Needle Localization Lumpectomy (11 grams): LOW GRADE DUCTAL CARCINOMA IN SITU (DCIS).  A. Tumor site: 10:30 o'clock.               B. Extent of DCIS: DCIS is multifocal, contiguously spanning approximately 25 mm from medial to lateral.               C. Architecture patterns: Solid and micropapillary.               D. Nuclear grade: Low.               E. Margins: DCIS focally extends to the anterior (3.0 mm focus) and inferior (0.4 mm) margins, is present 1.5 mm       from the superior margin (2.0 mm focus), 2.3 mm from the posterior margin and 5 mm from medial and lateral       margins of excision.  F. No lymph nodes submitted.  G. Hormone receptor status: ER moderately positive, AL strongly positive, Ki67 ~10% (see biomarker template).   H. Pathologic stage: pTis, NX.       3/29/2023 Bilateral breast skin sparing mastectomy with right sentinel lymph node biopsy   Final Diagnosis   1. Breast, Left, Mastectomy: Breast tissue with  A. Atypical ductal hyperplasia.  B. Florid ductal hyperplasia of the usual type.  C. Simple  cyst.  D. Secretory change.  E. Duct ectasia.  F. Clustered cysts.     2. Lymph Node, Right [place] Node #1, Excision (2 mm slices):               A. Single benign lymph node.     3. Breast, Right, Mastectomy: Ductal carcinoma in situ.               A. The ductal carcinoma in situ is of low to intermediate nuclear grade with solid and cribriform architecture and        focal necrosis.   B. The ductal carcinoma in situ measures up to 40 mm  (4 consecutive 1 cm slices).  C. The ductal carcinoma in situ comes within 2.5 mm of the posterior margin, 10 mm of the medial margin,       16 mm of the anterior margin, 75 mm of the inferior margin, 85 mm of the superior and 130 mm of the lateral         margin.  D. Microcalcifications are not associated with the ductal carcinoma in situ.  E. Calcifications are present with non-neoplastic tissue.  F. Foci of atypical ductal hyperplasia  G. Lumpectomy site changes are present with the ductal carcinoma in situ surrounding the areas of the        lumpectomy site.     Comment: The case has been previously staged [id number] that will not be repeated as only residual ductal carcinoma in situ is identified. It was originally staged as Pathologic stage pTis, NX. Given the additional lymph nodes it is pTis, pN0. Hormone receptors were performed on prior tissue and documented in case [id number] which are ER moderate positivity, CT strongly positive, Ki-67 10%. The ER was 81-90% positive. The CT was % positive.     4. Lymph Node, Right [place] Node #2, Excision (2 mm slices):               A. Single benign lymph node.       Patient is healing from surgery and referred here for evaluation following bilateral mastectomy with reconstruction             Current Outpatient Medications on File Prior to Visit   Medication Sig Dispense Refill    ALBUTEROL SULFATE HFA IN Inhale Every 4 (Four) Hours As Needed.      bisoprolol (ZEBeta) 5 MG tablet Take 1 tablet by mouth Every Night. 90 tablet  3    butalbital-acetaminophen-caffeine (FIORICET, ESGIC) -40 MG per tablet Take 1 tablet by mouth Every 4 (Four) Hours As Needed for Headache.      cyclobenzaprine (FLEXERIL) 10 MG tablet Take 2 tablets by mouth Daily.      diazePAM (VALIUM) 2 MG tablet Take 1 tablet by mouth 3 (Three) Times a Day.      diclofenac (VOLTAREN) 50 MG EC tablet Take 1 tablet by mouth 2 (Two) Times a Day. HOLD PER MD INSTRUCTIONS PRIOR TO SURGERY      Diclofenac Sodium (VOLTAREN) 1 % gel gel Apply 4 g topically to the appropriate area as directed Daily.      dilTIAZem SR (CARDIZEM SR) 60 MG 12 hr capsule Take 1 tablet by mouth every day and can take 1 extra tablet daily as needed for palpitations 180 capsule 3    Erenumab-aooe (AIMOVIG, 140 MG DOSE, SC) Inject 1 dose under the skin into the appropriate area as directed Every 30 (Thirty) Days.      fludrocortisone 0.1 MG tablet Take 2 tablets by mouth Daily. 180 tablet 2    fluticasone (FLONASE) 50 MCG/ACT nasal spray Administer 2 sprays into the nostril(s) as directed by provider Every Night.      ketorolac (TORADOL) 10 MG tablet Take 1 tablet by mouth 2 (Two) Times a Day As Needed. for pain      Loratadine 10 MG capsule Take 1 capsule by mouth Daily.      metoclopramide (REGLAN) 10 MG tablet Take 1 tablet by mouth 2 (Two) Times a Day Before Meals.      NON FORMULARY Epidural-Bupivicane-lidocaine injections every 3 months      NON FORMULARY 125 mg Daily As Needed. Sodium tablets      omeprazole (priLOSEC) 40 MG capsule Take 1 capsule by mouth Daily.      OnabotulinumtoxinA (BOTOX IJ) Inject  as directed Every 3 (Three) Months.      Spiriva Respimat 1.25 MCG/ACT aerosol solution inhaler Inhale 2 puffs Daily.      topiramate (TOPAMAX) 50 MG tablet Take 1.5 tablets by mouth 2 (Two) Times a Day.      traZODone (DESYREL) 50 MG tablet Take 1 tablet by mouth Every Night.      methocarbamol (ROBAXIN) 750 MG tablet Take 1 tablet by mouth Every 4 (Four) Hours.       No current  "facility-administered medications on file prior to visit.        I have reviewed Past Medical, Surgical History, Social History, Meds and Allergies.     REVIEW OF SYSTEMS:  See interval History      Objective     Vitals:    12/17/24 1312   BP: 123/77   Pulse: 114   Resp: 16   Temp: 97.6 °F (36.4 °C)   TempSrc: Oral   SpO2: 98%   Weight: 92.5 kg (204 lb)   Height: 162.6 cm (64.02\")   PainSc: 4  Comment: POTS is acting up   PainLoc: Abdomen             12/17/2024     1:11 PM   Current Status   ECOG score 0       Physical Exam  Constitutional:       Appearance: Normal appearance.   HENT:      Head: Normocephalic and atraumatic.      Mouth/Throat:      Mouth: Mucous membranes are moist.      Pharynx: Oropharynx is clear.   Eyes:      Extraocular Movements: Extraocular movements intact.      Pupils: Pupils are equal, round, and reactive to light.   Cardiovascular:      Rate and Rhythm: Normal rate and regular rhythm.   Pulmonary:      Effort: Pulmonary effort is normal.      Breath sounds: Normal breath sounds.   Chest:      Comments: Status post bilateral mastectomy with reconstruction  Abdominal:      General: Bowel sounds are normal.      Palpations: Abdomen is soft.   Musculoskeletal:      Cervical back: Normal range of motion and neck supple.   Neurological:      General: No focal deficit present.      Mental Status: She is alert and oriented to person, place, and time.   Psychiatric:         Mood and Affect: Mood normal.         Behavior: Behavior normal.               RECENT LABS:  Hematology WBC   Date Value Ref Range Status   12/17/2024 6.45 3.40 - 10.80 10*3/mm3 Final   02/25/2021 3.81 3.70 - 11.00 k/uL Final   08/31/2018 26 to 100 0 - 5 /hpf Final     Comment:     Culture indicated, results to follow.     RBC   Date Value Ref Range Status   12/17/2024 4.62 3.77 - 5.28 10*6/mm3 Final   02/25/2021 4.15 3.90 - 5.20 m/uL Final     Hemoglobin   Date Value Ref Range Status   12/17/2024 14.6 12.0 - 15.9 g/dL Final "   02/25/2021 12.1 11.5 - 15.5 g/dL Final     Hematocrit   Date Value Ref Range Status   12/17/2024 44.0 34.0 - 46.6 % Final   02/25/2021 38.0 36.0 - 46.0 % Final     Platelets   Date Value Ref Range Status   12/17/2024 247 140 - 450 10*3/mm3 Final   02/25/2021 255 150 - 400 k/uL Final        Imaging  MRI Breast Bilateral Screening With & Without Contrast (12/06/2024 13:40)   CT Chest With Contrast Diagnostic (11/26/2024 12:35)  CT Abdomen Pelvis With Contrast (11/26/2024 12:35)    Assessment & Plan     *pTis N0 M0, stage 0, right breast ductal carcinoma in situ, ER/VT positive   *Status post bilateral mastectomy  initially had symptoms of itching in the right breast  Bilateral diagnostic mammogram and ultrasound on October 2022 showed that the bilateral diagnostic mammogram was negative but the ultrasound showed a lesion at 10:30 position which could be cyst versus solid mass  Ultrasound-guided biopsy of the mass showed atypical ductal hyperplasia  February 9, 2022: Right breast excisional biopsy showed ductal carcinoma in situ, multifocal  S/p bilateral mastectomy with sentinel lymph nodes on the right sentinel lymph node surgery showed left breast was showing atypical ductal hyperplasia but right breast showed evidence of ductal carcinoma in situ.  40 mm ER/VT positive s/p bilateral men mastectomy and reconstruction  Patient is s/p bilateral mastectomy for DCIS and does not require any endocrine therapy  December 2024: Patient established care with me.  I have reviewed the results of MRI breast ordered by plastic surgeon for evaluation of her pruritus. MRI breast is unremarkable.  Examination unremarkable as well      *Pulmonary nodules  *Right hilar lymphadenopathy  *Abnormal CT scan of the colon  July 31, 2024 CT chest abdomen pelvis-nonspecific scattered pulmonary nodules measuring up to 0.4 cm.  Follow-up CT chest in 3 to 6 months recommended.  Nonspecific prominent right hilar lymph node.  Short segment  ascending colon narrowing, incompletely assessed.  Underlying lesion cannot be excluded  2024-Dr. Anderson reviewed results of the CT scan, provided a referral to Dr. Olvin Mendez to follow-up on lung nodules; and Dr. Marian Vera at [facility] gastroenterology for colonoscopy consideration  2024 CT chest abdomen pelvis-new 6 mm right upper lobe nodule and a new small groundglass opacity in left upper lobe.  Infectious versus inflammatory.  Follow-up CT in 3 to 6 months to ensure clearing recommended.  Stable additional micronodules.  2024: Reviewed results of scan with the patient.  Will obtain repeat scans in 3 months    *Family history of breast cancer  Patient's sister had renal cell at age 50 and  of renal cell cancer at age 52 with liver metastasis. Patient had a second sister who had breast cancer at age 30 and required chemotherapy and still alive at 50. Maternal cousin had brain cancer and paternal cousin had leukemia around 50  [facility] genetic test negative for 9 gene panel    Plan  Reviewed results of CT chest abdomen pelvis  Scheduled for EGD and Colonoscopy in 2025  Obtain repeat CT chest abdomen pelvis in 3 months  Televisit with labs to review the results of scans in 3 months  If scans unremarkable, she can be released from our practice since she is in observation for her DCIS, and is status post bilateral mastectomy        Annamaria Medellin MD      I spent 32 total minutes, face-to-face, caring for Dolores saenz. Greater than 50% of this time involved counseling and/or coordination of care as documented within this note.

## 2024-12-06 ENCOUNTER — HOSPITAL ENCOUNTER (OUTPATIENT)
Dept: MRI IMAGING | Facility: HOSPITAL | Age: 49
Discharge: HOME | End: 2024-12-06
Admitting: PLASTIC SURGERY
Payer: OTHER GOVERNMENT

## 2024-12-06 DIAGNOSIS — Z85.3 HX OF BREAST CANCER: ICD-10-CM

## 2024-12-06 PROCEDURE — A9577 INJ MULTIHANCE: HCPCS | Performed by: PLASTIC SURGERY

## 2024-12-06 PROCEDURE — 77049 MRI BREAST C-+ W/CAD BI: CPT

## 2024-12-06 PROCEDURE — 25510000002 GADOBENATE DIMEGLUMINE 529 MG/ML SOLUTION: Performed by: PLASTIC SURGERY

## 2024-12-06 RX ADMIN — GADOBENATE DIMEGLUMINE 18 ML: 529 INJECTION, SOLUTION INTRAVENOUS at 13:20

## 2024-12-11 ENCOUNTER — HOSPITAL ENCOUNTER (OUTPATIENT)
Dept: CARDIOLOGY | Facility: HOSPITAL | Age: 49
Discharge: HOME | End: 2024-12-11
Payer: OTHER GOVERNMENT

## 2024-12-11 DIAGNOSIS — R42 DIZZINESS: ICD-10-CM

## 2024-12-11 DIAGNOSIS — R07.9 CHEST PAIN, UNSPECIFIED TYPE: ICD-10-CM

## 2024-12-11 LAB
BH CV REST NUCLEAR ISOTOPE DOSE: 10 MCI
BH CV STRESS COMMENTS STAGE 1: NORMAL
BH CV STRESS DOSE REGADENOSON STAGE 1: 0.4
BH CV STRESS DURATION MIN STAGE 1: 0
BH CV STRESS DURATION SEC STAGE 1: 10
BH CV STRESS NUCLEAR ISOTOPE DOSE: 30 MCI
BH CV STRESS PROTOCOL 1: NORMAL
BH CV STRESS RECOVERY BP: NORMAL MMHG
BH CV STRESS RECOVERY HR: 94 BPM
BH CV STRESS STAGE 1: 1
LV EF NUC BP: 75 %
MAXIMAL PREDICTED HEART RATE: 171 BPM
PERCENT MAX PREDICTED HR: 63.16 %
STRESS BASELINE BP: NORMAL MMHG
STRESS BASELINE HR: 81 BPM
STRESS PERCENT HR: 74 %
STRESS POST PEAK BP: NORMAL MMHG
STRESS POST PEAK HR: 108 BPM
STRESS TARGET HR: 145 BPM

## 2024-12-11 PROCEDURE — 78452 HT MUSCLE IMAGE SPECT MULT: CPT

## 2024-12-11 PROCEDURE — 25010000002 REGADENOSON 0.4 MG/5ML SOLUTION: Performed by: INTERNAL MEDICINE

## 2024-12-11 PROCEDURE — 93017 CV STRESS TEST TRACING ONLY: CPT

## 2024-12-11 PROCEDURE — A9500 TC99M SESTAMIBI: HCPCS | Performed by: INTERNAL MEDICINE

## 2024-12-11 PROCEDURE — 25010000002 AMINOPHYLLINE PER 250 MG: Performed by: INTERNAL MEDICINE

## 2024-12-11 PROCEDURE — 34310000005 TECHNETIUM SESTAMIBI: Performed by: INTERNAL MEDICINE

## 2024-12-11 RX ORDER — REGADENOSON 0.08 MG/ML
0.4 INJECTION, SOLUTION INTRAVENOUS
Status: COMPLETED | OUTPATIENT
Start: 2024-12-11 | End: 2024-12-11

## 2024-12-11 RX ORDER — AMINOPHYLLINE 25 MG/ML
125 INJECTION, SOLUTION INTRAVENOUS
Status: COMPLETED | OUTPATIENT
Start: 2024-12-11 | End: 2024-12-11

## 2024-12-11 RX ADMIN — TECHNETIUM TC 99M SESTAMIBI 1 DOSE: 1 INJECTION INTRAVENOUS at 12:39

## 2024-12-11 RX ADMIN — TECHNETIUM TC 99M SESTAMIBI 1 DOSE: 1 INJECTION INTRAVENOUS at 13:39

## 2024-12-11 RX ADMIN — REGADENOSON 0.4 MG: 0.08 INJECTION, SOLUTION INTRAVENOUS at 13:39

## 2024-12-11 RX ADMIN — AMINOPHYLLINE 125 MG: 25 INJECTION, SOLUTION INTRAVENOUS at 13:40

## 2024-12-17 ENCOUNTER — TELEPHONE (OUTPATIENT)
Dept: ONCOLOGY | Facility: CLINIC | Age: 49
End: 2024-12-17
Payer: OTHER GOVERNMENT

## 2024-12-17 ENCOUNTER — OFFICE VISIT (OUTPATIENT)
Dept: ONCOLOGY | Facility: CLINIC | Age: 49
End: 2024-12-17
Payer: OTHER GOVERNMENT

## 2024-12-17 ENCOUNTER — LAB (OUTPATIENT)
Dept: LAB | Facility: HOSPITAL | Age: 49
End: 2024-12-17
Payer: OTHER GOVERNMENT

## 2024-12-17 VITALS
WEIGHT: 204 LBS | DIASTOLIC BLOOD PRESSURE: 77 MMHG | OXYGEN SATURATION: 98 % | TEMPERATURE: 97.6 F | HEART RATE: 114 BPM | RESPIRATION RATE: 16 BRPM | BODY MASS INDEX: 34.83 KG/M2 | HEIGHT: 64 IN | SYSTOLIC BLOOD PRESSURE: 123 MMHG

## 2024-12-17 DIAGNOSIS — N60.91 ATYPICAL DUCTAL HYPERPLASIA OF BREAST, BILATERAL: ICD-10-CM

## 2024-12-17 DIAGNOSIS — R91.1 LUNG NODULE SEEN ON IMAGING STUDY: ICD-10-CM

## 2024-12-17 DIAGNOSIS — D05.10 DUCTAL CARCINOMA IN SITU (DCIS) OF BREAST, UNSPECIFIED LATERALITY: Primary | ICD-10-CM

## 2024-12-17 DIAGNOSIS — D05.10 DUCTAL CARCINOMA IN SITU (DCIS) OF BREAST, UNSPECIFIED LATERALITY: ICD-10-CM

## 2024-12-17 DIAGNOSIS — N60.92 ATYPICAL DUCTAL HYPERPLASIA OF BREAST, BILATERAL: ICD-10-CM

## 2024-12-17 LAB
ALBUMIN SERPL-MCNC: 3.9 G/DL (ref 3.5–5.2)
ALBUMIN/GLOB SERPL: 1.2 G/DL
ALP SERPL-CCNC: 89 U/L (ref 39–117)
ALT SERPL W P-5'-P-CCNC: 58 U/L (ref 1–33)
ANION GAP SERPL CALCULATED.3IONS-SCNC: 10.6 MMOL/L (ref 5–15)
AST SERPL-CCNC: 45 U/L (ref 1–32)
BASOPHILS # BLD AUTO: 0.06 10*3/MM3 (ref 0–0.2)
BASOPHILS NFR BLD AUTO: 0.9 % (ref 0–1.5)
BILIRUB SERPL-MCNC: 0.3 MG/DL (ref 0–1.2)
BUN SERPL-MCNC: 13 MG/DL (ref 6–20)
BUN/CREAT SERPL: 17.3 (ref 7–25)
CALCIUM SPEC-SCNC: 8.9 MG/DL (ref 8.6–10.5)
CHLORIDE SERPL-SCNC: 103 MMOL/L (ref 98–107)
CO2 SERPL-SCNC: 25.4 MMOL/L (ref 22–29)
CREAT SERPL-MCNC: 0.75 MG/DL (ref 0.57–1)
DEPRECATED RDW RBC AUTO: 46.1 FL (ref 37–54)
EGFRCR SERPLBLD CKD-EPI 2021: 97.7 ML/MIN/1.73
EOSINOPHIL # BLD AUTO: 0.23 10*3/MM3 (ref 0–0.4)
EOSINOPHIL NFR BLD AUTO: 3.6 % (ref 0.3–6.2)
ERYTHROCYTE [DISTWIDTH] IN BLOOD BY AUTOMATED COUNT: 13.1 % (ref 12.3–15.4)
GLOBULIN UR ELPH-MCNC: 3.3 GM/DL
GLUCOSE SERPL-MCNC: 135 MG/DL (ref 65–99)
HCT VFR BLD AUTO: 44 % (ref 34–46.6)
HGB BLD-MCNC: 14.6 G/DL (ref 12–15.9)
IMM GRANULOCYTES # BLD AUTO: 0.02 10*3/MM3 (ref 0–0.05)
IMM GRANULOCYTES NFR BLD AUTO: 0.3 % (ref 0–0.5)
LYMPHOCYTES # BLD AUTO: 1.8 10*3/MM3 (ref 0.7–3.1)
LYMPHOCYTES NFR BLD AUTO: 27.9 % (ref 19.6–45.3)
MCH RBC QN AUTO: 31.6 PG (ref 26.6–33)
MCHC RBC AUTO-ENTMCNC: 33.2 G/DL (ref 31.5–35.7)
MCV RBC AUTO: 95.2 FL (ref 79–97)
MONOCYTES # BLD AUTO: 0.41 10*3/MM3 (ref 0.1–0.9)
MONOCYTES NFR BLD AUTO: 6.4 % (ref 5–12)
NEUTROPHILS NFR BLD AUTO: 3.93 10*3/MM3 (ref 1.7–7)
NEUTROPHILS NFR BLD AUTO: 60.9 % (ref 42.7–76)
NRBC BLD AUTO-RTO: 0 /100 WBC (ref 0–0.2)
PLATELET # BLD AUTO: 247 10*3/MM3 (ref 140–450)
PMV BLD AUTO: 8.7 FL (ref 6–12)
POTASSIUM SERPL-SCNC: 4.1 MMOL/L (ref 3.5–5.2)
PROT SERPL-MCNC: 7.2 G/DL (ref 6–8.5)
RBC # BLD AUTO: 4.62 10*6/MM3 (ref 3.77–5.28)
SODIUM SERPL-SCNC: 139 MMOL/L (ref 136–145)
WBC NRBC COR # BLD AUTO: 6.45 10*3/MM3 (ref 3.4–10.8)

## 2024-12-17 PROCEDURE — 36415 COLL VENOUS BLD VENIPUNCTURE: CPT

## 2024-12-17 PROCEDURE — 80053 COMPREHEN METABOLIC PANEL: CPT

## 2024-12-17 PROCEDURE — 85025 COMPLETE CBC W/AUTO DIFF WBC: CPT

## 2024-12-17 NOTE — TELEPHONE ENCOUNTER
Let Ms. Dejesus know her liver enzymes were mildly elevated, and Dr. Medellin is recommending she follow up with her PCP regarding the management.  Patient verbalized understanding and appreciation.

## 2024-12-17 NOTE — PROGRESS NOTES
Please let this patient know liver enzymes are slightly elevated.  Recommend further follow-up and management with her primary care for her transaminitis

## 2024-12-17 NOTE — TELEPHONE ENCOUNTER
----- Message from Annamaria Medellin sent at 12/17/2024  2:01 PM EST -----  Please let this patient know liver enzymes are slightly elevated.  Recommend further follow-up and management with her primary care for her transaminitis

## 2025-01-03 ENCOUNTER — TELEPHONE (OUTPATIENT)
Dept: CARDIOLOGY | Facility: CLINIC | Age: 50
End: 2025-01-03
Payer: OTHER GOVERNMENT

## 2025-01-03 NOTE — TELEPHONE ENCOUNTER
SW PT VIA PHONE SHE IS AWARE DR. KEITH'S SCHEDULE IS HAVING SOME CHANGES AND THAT SHE WILL SEE JENNIFER COPPOLA ON 2/5/25 AT 4:30 SHE REFUSED THE APPT, SHE STATES SHE ONLY NEEDS TO SEE DR. KEITH SINCE HE SPECIALIZES IN HER POTS AND A PA CAN NOT TREAT HER FOR HER DIAGNOSIS. PT WAS NOT PLEASED WITH RESCHEDULING TO DR. KEITH'S NEXT AVAILABLE AND DEMANDED TO BE PUT ON THE WAIT LIST.

## 2025-01-15 ENCOUNTER — HOSPITAL ENCOUNTER (OUTPATIENT)
Dept: PHYSICAL THERAPY | Facility: HOSPITAL | Age: 50
Setting detail: THERAPIES SERIES
Discharge: HOME | End: 2025-01-15
Payer: OTHER GOVERNMENT

## 2025-01-15 DIAGNOSIS — Z90.13 STATUS POST BILATERAL MASTECTOMY: Primary | ICD-10-CM

## 2025-01-15 DIAGNOSIS — M25.612 DECREASED ROM OF LEFT SHOULDER: ICD-10-CM

## 2025-01-15 DIAGNOSIS — M25.611 DECREASED ROM OF RIGHT SHOULDER: ICD-10-CM

## 2025-01-15 DIAGNOSIS — I89.0 LYMPHEDEMA OF RIGHT ARM: ICD-10-CM

## 2025-01-15 PROCEDURE — 93702 BIS XTRACELL FLUID ANALYSIS: CPT

## 2025-01-15 PROCEDURE — 97535 SELF CARE MNGMENT TRAINING: CPT

## 2025-01-15 NOTE — THERAPY RE-EVALUATION
Physical Therapy Lymphedema Re-Evaluation  [facility]     Patient Name: Dolores Dejesus  : 1975  MRN: [id number]  Today's Date: 1/15/2025      Visit Date: 01/15/2025    Visit Dx:    ICD-10-CM ICD-9-CM   1. Status post bilateral mastectomy  Z90.13 V45.71   2. Decreased ROM of left shoulder  M25.612 719.51   3. Decreased ROM of right shoulder  M25.611 719.51   4. Lymphedema of right arm  I89.0 457.1       Patient Active Problem List   Diagnosis    Near syncope    Dizziness    Migraine aura without headache    Tachycardia    Hx of migraine headaches    POTS (postural orthostatic tachycardia syndrome)    Palpitations    Metabolic syndrome    PSVT (paroxysmal supraventricular tachycardia)    History of cardiac radiofrequency ablation (RFA)    Atypical ductal hyperplasia of breast, bilateral    Ductal carcinoma in situ (DCIS) of breast        Past Medical History:   Diagnosis Date    Abnormal ECG May 2016    Anxiety     Asthma     Breast atypical hyperplasia     Breast cancer 2023    Cholelithiasis 2022    Pain on my right side    Chronic kidney disease     hx of kidney stones    Coronary artery disease May 2026    POTS    DDD (degenerative disc disease), lumbar     Depression     Ductal carcinoma in situ (DCIS) of right breast 2023    GERD (gastroesophageal reflux disease)     History of ear surgery     Hx of hysterectomy 2018    Hypertension     Hypoglycemia     Migraines     Monoclonal (M) protein disease, 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    PONV (postoperative nausea and vomiting) 2006    Torn acl repair work up from surgery very nauseous    POTS (postural orthostatic tachycardia syndrome)     Rectal bleeding 10/2021    Due to constipation    S/P ACL surgery     S/P removal of ovarian cyst     Tattoos     Thyroid nodule         Past Surgical History:   Procedure Laterality Date    ABLATION OF DYSRHYTHMIC FOCUS  2021    [facility]    BREAST AUGMENTATION Bilateral 2023    BREAST  BIOPSY  Oct 2022    Need surgery right    BREAST LUMPECTOMY Right 2023    Procedure: Right breast wire localized excisional biopsy;  Surgeon: Dasia Marroquin MD;  Location: [facility];  Service: General;  Laterality: Right;    BREAST RECONSTRUCTION Bilateral 2023    Procedure: BILATERAL IMMEDIATE BREAST  RECONSTRUCTION  WITH TISSUE EXPANDERS AND BIOLOGIC;  Surgeon: Georgia Baer MD;  Location: [facility];  Service: Plastics;  Laterality: Bilateral;    BREAST SURGERY  23    Found cancer    CARDIAC CATHETERIZATION  2021    CARDIOVASCULAR STRESS TEST  2018    R.Stress- 6 Min. 46 secs. 7.0 METS. 76% THR. BP- 121/73. Pt had CP. Inconclusive test    CARDIOVASCULAR STRESS TEST  10/17/2018    L. Cardiolite- Negative.    CARDIOVASCULAR STRESS TEST  2022    Lexiscan- EF > 70%. Negative    CARDIOVASCULAR STRESS TEST  2024    Lexiscan-. EF > 70%. Breast attenuation     SECTION      X 3    CHOLECYSTECTOMY      CHOLECYSTECTOMY  2023    CONVERTED (HISTORICAL) HOLTER  2017    @[email or web address].AVG HR 98 BPM    CONVERTED (HISTORICAL) HOLTER  2021    <8 Days. .. 1 run of SVT    CONVERTED (HISTORICAL) HOLTER  2021    Baseline sinus tach, AVG , current advised, follow with EP    ECHO - CONVERTED  2017    @[facility]. EF 60%. Mild AI    ECHO - CONVERTED  2017    EF 60%. MIld- Mod AI. No Shunt    ECHO - CONVERTED  2018    EF 55%. Mild AI & MR. RVSP- 26 mmHg.    ECHO - CONVERTED  2020    @ [facility]. EF 60%. Mild MR & AI. No AS. RVSP- 26 mmHg    FOOT SURGERY Right     HAND SURGERY Right     KNEE ACL RECONSTRUCTION Left     LAPAROSCOPIC TUBAL LIGATION      MASTECTOMY W/ SENTINEL NODE BIOPSY Right 2023    Procedure: bilateral skin sparing mastectomy, right sentinel lymph node biopsy;  Surgeon: Dasia Marroquin MD;  Location: [facility];  Service: General;  Laterality: Right;    OTHER SURGICAL HISTORY  07/10/2017    Extended Monitor-  AVG HR 81 BPM. [place] an Syncope with no EKG changes    OTHER SURGICAL HISTORY  09/18/2018    Tilt Table- Positive.    OTHER SURGICAL HISTORY  05/09/2019    MCOT:  High , low 51, AVG 82, sinus noted, rare PVC, no arrythymia    RIGHT OOPHORECTOMY      STAPEDECTOMY      US CAROTID UNILATERAL  04/09/2019    Jensen:  No flow limiting stenosis bilaterally       Visit Dx:    ICD-10-CM ICD-9-CM   1. Status post bilateral mastectomy  Z90.13 V45.71   2. Decreased ROM of left shoulder  M25.612 719.51   3. Decreased ROM of right shoulder  M25.611 719.51   4. Lymphedema of right arm  I89.0 457.1            Lymphedema       Row Name 01/15/25 1400             Subjective Pain    Able to rate subjective pain? yes  -LB      Pre-Treatment Pain Level 0  -LB      Post-Treatment Pain Level 0  -LB         Subjective    Subjective Comments I am doing well, no real issues.  -LB         Lymphedema Assessment    Lymphedema Classification RUE:;at risk/stage 0  -LB      Lymphedema Cancer Related Sx bilateral;modified radical mastectomy;right;sentinel node biopsy  -LB      Lymphedema Surgery Comments 3/29/23  -LB      Lymph Nodes Removed # 2  -LB      Positive Lymph Nodes # 0  -LB      Chemo Received no  -LB      Radiation Therapy Received no  -LB      Infections or Cellulitis? no  -LB         Posture/Observations    Alignment Options Forward head;Rounded shoulders  -LB      Forward Head Moderate  -LB      Rounded Shoulders Bilateral:;Moderate  -LB         General ROM    GENERAL ROM COMMENTS BUE WFL  -LB         Lymphedema Edema Assessment    Edema Assessment Comment reports B lateral breast edema  -LB         LUE Quick Girth (cm)    Axilla 37.6 cm  -LB      Mid upper arm 35.3 cm  -LB      Elbow 25.8 cm  -LB      Mid forearm 22.9 cm  -LB      Wrist crease 15.4 cm  -LB      Web space 19.4 cm  -LB      Met-heads 18 cm  -LB      LUE Quick Girth Total 174.4  -LB         RUE Quick Girth (cm)    Axilla 39.7 cm  -LB      Mid upper arm 35.8 cm  -LB       Elbow 25.5 cm  -LB      Mid forearm 25 cm  -LB      Wrist crease 16 cm  -LB      Web space 18.6 cm  -LB      Met-heads 18.1 cm  -LB      RUE Quick Girth Total 178.7  -LB         Compression/Skin Care    Compression/Skin Care Comments medium Jobst Carmen Lite regular length (20-30 mmHg)  -LB         L-Dex Bioimpedence Screening    L-Dex Measurement Extremity RUE  -LB      L-Dex Patient Position Standing  -LB      L-Dex UE Dominate Side Right  -LB      L-Dex UE At Risk Side Right  -LB      L-Dex UE Pre Surgical Value No  -LB      L-Dex UE Score 5  -LB      L-Dex UE Baseline Score 3.9  -LB      L-Dex UE Value Change 1.1  -LB      L-Dex UE Comment WNL  -LB      $ L-Dex Charge yes  -LB                User Key  (r) = Recorded By, (t) = Taken By, (c) = Cosigned By      Initials Name Provider Type    Jennifer Mosquera, PT Physical Therapist                                    Therapy Education  Education Details: reviewed s/s of lymphedema, steps to prevention, bioimpedance results and interpretation, use of compression sleeve  Given: Symptoms/condition management, Edema management  Program: Reinforced  How Provided: Verbal  Provided to: Patient  Level of Understanding: Verbalized  31509 - PT Self Care/Mgmt Minutes: 15       OP Exercises       Row Name 01/15/25 1400             Subjective    Subjective Comments I am doing well, no real issues.  -LB         Subjective Pain    Able to rate subjective pain? yes  -LB      Pre-Treatment Pain Level 0  -LB      Post-Treatment Pain Level 0  -LB                User Key  (r) = Recorded By, (t) = Taken By, (c) = Cosigned By      Initials Name Provider Type    Jennifer Mosquera, PT Physical Therapist                                 PT OP Goals       Row Name 01/15/25 1500          Long Term Goals    LTG Date to Achieve 07/13/23  -LB     LTG 1 Patient will be independent in comprehensive HEP to allow ongoing self management.  -LB     LTG 1 Progress Met  -LB     LTG 2 Patient will acquire  appropriately fitted compression garment and verbalize appropriate wear schedule.  -LB     LTG 2 Progress Met  -LB     LTG 3 Patient will demonstrate full painfree AROM B UE.  -LB     LTG 3 Progress Met  -LB     LTG 4 Patient will maintain LDex bioimpedance WNL.  -LB     LTG 4 Progress Ongoing  -LB     LTG 4 Progress Comments WNL at 5.0  -LB     LTG 5 Patient will improve QuickDash score to 20% or less.  -LB     LTG 5 Progress Ongoing  -LB               User Key  (r) = Recorded By, (t) = Taken By, (c) = Cosigned By      Initials Name Provider Type    LB Jennifer Toro, PT Physical Therapist                     PT Assessment/Plan       Row Name 01/15/25 9928          PT Assessment    Functional Limitations Other (comment)  stage 0 lymphedema  -LB     Impairments Edema;Impaired lymphatic circulation;Impaired postural alignment;Posture  -LB     Assessment Comments Pt returns for re-evaluation demonstrating AROM WFL and reporting B lateral breast edema. Suggested return to compression bra to address. She was not able to complete order for new sleeve last session so we will replace today. She will benefit from Jobst Carmen Lite medium 20-30 mmHG compression sleeve to address increasing RUE circumferential measurements and to wear for air travel. We repeated LDex bioimpedance testing today with stable score WNL at 5.0. Pt has met 3/5 goals. She remains appropriate for continued skilled PT services to address deficits as needed and continue to monitor bioimpedance.  -LB     Rehab Potential Good  -LB     Patient/caregiver participated in establishment of treatment plan and goals Yes  -LB     Patient would benefit from skilled therapy intervention Yes  -LB        PT Plan    PT Frequency Other (comment)  -LB     Predicted Duration of Therapy Intervention (PT) repeat bioimpedance in 3 months  -LB     Planned CPT's? PT EVAL LOW COMPLEXITY: 47091;PT RE-EVAL: 76752;PT THER PROC EA 15 MIN: 46554;PT THER ACT EA 15 MIN: 60345;PT  MANUAL THERAPY EA 15 MIN: 16980;PT NEUROMUSC RE-EDUCATION EA 15 MIN: 58613;PT SELF CARE/HOME MGMT/TRAIN EA 15: 13357;PT BIS XTRACELL FLUID ANALYSIS: 11612  -LB     PT Plan Comments repeat bioimpedance, compression garment fit? circumferential  -LB               User Key  (r) = Recorded By, (t) = Taken By, (c) = Cosigned By      Initials Name Provider Type    Jennifer Mosquera, PT Physical Therapist                                 Time Calculation:   Start Time: 1445  Stop Time: 1515  Time Calculation (min): 30 min  Total Timed Code Minutes- PT: 15 minute(s)  Timed Charges  07373 - PT Self Care/Mgmt Minutes: 15  Total Minutes  Timed Charges Total Minutes: 15   Total Minutes: 15   Therapy Charges for Today       Code Description Service Date Service Provider Modifiers Qty    67782968840 HC PT BIS XTRACELL FLUID ANALYSIS 1/15/2025 Jennifer Toro, PT  1    90283732515 HC PT SELF CARE/MGMT/TRAIN EA 15 MIN 1/15/2025 Jennifer Toro, PT GP 1                      Jennifer Toro PT  1/15/2025

## 2025-03-04 ENCOUNTER — TELEPHONE (OUTPATIENT)
Dept: ONCOLOGY | Facility: CLINIC | Age: 50
End: 2025-03-04
Payer: OTHER GOVERNMENT

## 2025-03-04 NOTE — TELEPHONE ENCOUNTER
CT chest order faxed to [facility] as requested.  Fax record shows fax to be successful.

## 2025-03-04 NOTE — TELEPHONE ENCOUNTER
Caller: BENIGNO    Relationship: Other    Best call back number: [phone]     What is the best time to reach you: ANYTIME    Who are you requesting to speak with (clinical staff, provider,  specific staff member): CLINICAL    What was the call regarding: BENIGNO STATED PATIENT IS SCHEDULED FOR A CT SCAN TOMORROW 3/5 - NEED ORDER FAXED -[phone].

## 2025-03-10 ENCOUNTER — TELEPHONE (OUTPATIENT)
Dept: ONCOLOGY | Facility: CLINIC | Age: 50
End: 2025-03-10
Payer: OTHER GOVERNMENT

## 2025-03-10 NOTE — TELEPHONE ENCOUNTER
Called Ms. Dejesus to see why she did not have her CT scan done on 3/5/25 at [facility].  Patient has forgotten about it.  Asked her if she can get it rescheduled, and we will move out her appointment with Dr. Medellin 2-3 weeks in order to give her time to have it done prior to visit.  Patient verbalized understanding.    Message to  to move out appointment with Dr. Medellin.

## 2025-03-21 ENCOUNTER — TELEPHONE (OUTPATIENT)
Dept: ONCOLOGY | Facility: CLINIC | Age: 50
End: 2025-03-21
Payer: OTHER GOVERNMENT

## 2025-03-21 NOTE — TELEPHONE ENCOUNTER
Spoke with pt and told her Dr Medellin wanted me to move her telehealth out to after she has her CT scans 04-15-25. Pt confirmed new apt

## 2025-03-21 NOTE — TELEPHONE ENCOUNTER
Call to Ms. Dejesus to see if her CT had been done at [facility], as she has appointment with Dr. Medellin on Monday, 3/24/25.  Patient states [facility] told her they never had her scheduled and did not have an order.  She thought they were going to call this office.  Let her know we would try to figure this out and move out her appointment to after CT is done.  Understanding verbalized.    RN called [facility] and spoke with Kateryna in scheduling.  She had order for CT chest with contrast received on 3/4/25.  Patient was rescheduled at earliest date, 4/15/25 at 9:30 AM central time.    Call back to Ms. Dejesus and let her know she is scheduled for CT chest at [facility] on 4/15/25 at 9:30 AM central time, and scheduling will call her to reschedule appointment with Dr. Medellin for after CT scan is resulted.  Understanding verbalized.

## 2025-04-08 ENCOUNTER — TRANSCRIBE ORDERS (OUTPATIENT)
Dept: PHYSICAL THERAPY | Facility: HOSPITAL | Age: 50
End: 2025-04-08
Payer: OTHER GOVERNMENT

## 2025-04-08 DIAGNOSIS — Z90.13 STATUS POST BILATERAL MASTECTOMY: Primary | ICD-10-CM

## 2025-04-08 DIAGNOSIS — I89.0 LYMPHEDEMA: ICD-10-CM

## 2025-04-09 ENCOUNTER — HOSPITAL ENCOUNTER (OUTPATIENT)
Dept: PHYSICAL THERAPY | Facility: HOSPITAL | Age: 50
Setting detail: THERAPIES SERIES
Discharge: HOME | End: 2025-04-09
Payer: OTHER GOVERNMENT

## 2025-04-09 DIAGNOSIS — Z91.89 AT RISK FOR LYMPHEDEMA: ICD-10-CM

## 2025-04-09 DIAGNOSIS — M25.612 DECREASED ROM OF LEFT SHOULDER: ICD-10-CM

## 2025-04-09 DIAGNOSIS — M25.611 DECREASED ROM OF RIGHT SHOULDER: ICD-10-CM

## 2025-04-09 DIAGNOSIS — Z90.13 STATUS POST BILATERAL MASTECTOMY: Primary | ICD-10-CM

## 2025-04-09 PROCEDURE — 93702 BIS XTRACELL FLUID ANALYSIS: CPT

## 2025-04-09 PROCEDURE — 97535 SELF CARE MNGMENT TRAINING: CPT

## 2025-04-09 NOTE — THERAPY RE-EVALUATION
Physical Therapy Lymphedema Re-Evaluation  [facility]     Patient Name: Dolores Dejesus  : 1975  MRN: [id number]  Today's Date: 2025      Visit Date: 2025    Visit Dx:    ICD-10-CM ICD-9-CM   1. Status post bilateral mastectomy  Z90.13 V45.71   2. Decreased ROM of left shoulder  M25.612 719.51   3. Decreased ROM of right shoulder  M25.611 719.51   4. At risk for lymphedema  Z91.89 V49.89       Patient Active Problem List   Diagnosis    Near syncope    Dizziness    Migraine aura without headache    Tachycardia    Hx of migraine headaches    POTS (postural orthostatic tachycardia syndrome)    Palpitations    Metabolic syndrome    PSVT (paroxysmal supraventricular tachycardia)    History of cardiac radiofrequency ablation (RFA)    Atypical ductal hyperplasia of breast, bilateral    Ductal carcinoma in situ (DCIS) of breast        Past Medical History:   Diagnosis Date    Abnormal ECG May 2016    Anxiety     Asthma     Breast atypical hyperplasia     Breast cancer 2023    Cholelithiasis 2022    Pain on my right side    Chronic kidney disease     hx of kidney stones    Coronary artery disease May 2026    POTS    DDD (degenerative disc disease), lumbar     Depression     Ductal carcinoma in situ (DCIS) of right breast 2023    GERD (gastroesophageal reflux disease)     History of ear surgery     Hx of hysterectomy 2018    Hypertension     Hypoglycemia     Migraines     Monoclonal (M) protein disease, 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    PONV (postoperative nausea and vomiting) 2006    Torn acl repair work up from surgery very nauseous    POTS (postural orthostatic tachycardia syndrome)     Rectal bleeding 10/2021    Due to constipation    S/P ACL surgery     S/P removal of ovarian cyst     Tattoos     Thyroid nodule         Past Surgical History:   Procedure Laterality Date    ABLATION OF DYSRHYTHMIC FOCUS  2021    [facility]    BREAST AUGMENTATION Bilateral 2023    BREAST  BIOPSY  Oct 2022    Need surgery right    BREAST LUMPECTOMY Right 2023    Procedure: Right breast wire localized excisional biopsy;  Surgeon: Dasia Marroquin MD;  Location: [facility];  Service: General;  Laterality: Right;    BREAST RECONSTRUCTION Bilateral 2023    Procedure: BILATERAL IMMEDIATE BREAST  RECONSTRUCTION  WITH TISSUE EXPANDERS AND BIOLOGIC;  Surgeon: Georgia Baer MD;  Location: [facility];  Service: Plastics;  Laterality: Bilateral;    BREAST SURGERY  23    Found cancer    CARDIAC CATHETERIZATION  2021    CARDIOVASCULAR STRESS TEST  2018    R.Stress- 6 Min. 46 secs. 7.0 METS. 76% THR. BP- 121/73. Pt had CP. Inconclusive test    CARDIOVASCULAR STRESS TEST  10/17/2018    L. Cardiolite- Negative.    CARDIOVASCULAR STRESS TEST  2022    Lexiscan- EF > 70%. Negative    CARDIOVASCULAR STRESS TEST  2024    Lexiscan-. EF > 70%. Breast attenuation     SECTION      X 3    CHOLECYSTECTOMY      CHOLECYSTECTOMY  2023    CONVERTED (HISTORICAL) HOLTER  2017    @[facility].AVG HR 98 BPM    CONVERTED (HISTORICAL) HOLTER  2021    <8 Days. .. 1 run of SVT    CONVERTED (HISTORICAL) HOLTER  2021    Baseline sinus tach, AVG , current advised, follow with EP    ECHO - CONVERTED  2017    @[facility]. EF 60%. Mild AI    ECHO - CONVERTED  2017    EF 60%. MIld- Mod AI. No Shunt    ECHO - CONVERTED  2018    EF 55%. Mild AI & MR. RVSP- 26 mmHg.    ECHO - CONVERTED  2020    @ [facility]. EF 60%. Mild MR & AI. No AS. RVSP- 26 mmHg    FOOT SURGERY Right     HAND SURGERY Right     KNEE ACL RECONSTRUCTION Left     LAPAROSCOPIC TUBAL LIGATION      MASTECTOMY W/ SENTINEL NODE BIOPSY Right 2023    Procedure: bilateral skin sparing mastectomy, right sentinel lymph node biopsy;  Surgeon: Dasia Marroquin MD;  Location: [facility];  Service: General;  Laterality: Right;    OTHER SURGICAL HISTORY  07/10/2017    Extended Monitor-  AVG HR 81 BPM. [place] an Syncope with no EKG changes    OTHER SURGICAL HISTORY  09/18/2018    Tilt Table- Positive.    OTHER SURGICAL HISTORY  05/09/2019    MCOT:  High , low 51, AVG 82, sinus noted, rare PVC, no arrythymia    RIGHT OOPHORECTOMY      STAPEDECTOMY      US CAROTID UNILATERAL  04/09/2019    Jensen:  No flow limiting stenosis bilaterally       Visit Dx:    ICD-10-CM ICD-9-CM   1. Status post bilateral mastectomy  Z90.13 V45.71   2. Decreased ROM of left shoulder  M25.612 719.51   3. Decreased ROM of right shoulder  M25.611 719.51   4. At risk for lymphedema  Z91.89 V49.89            Lymphedema       Row Name 04/09/25 1500             Subjective Pain    Able to rate subjective pain? yes  -LB      Pre-Treatment Pain Level 0  -LB      Post-Treatment Pain Level 0  -LB         Subjective    Subjective Comments I am doing ok. I think my implants are leaking.  -LB         Lymphedema Assessment    Lymphedema Classification RUE:;at risk/stage 0  -LB      Lymphedema Cancer Related Sx bilateral;modified radical mastectomy;right;sentinel node biopsy  -LB      Lymphedema Surgery Comments 3/29/23  -LB      Lymph Nodes Removed # 2  -LB      Positive Lymph Nodes # 0  -LB      Chemo Received no  -LB      Radiation Therapy Received no  -LB      Infections or Cellulitis? no  -LB         Posture/Observations    Alignment Options Forward head;Rounded shoulders  -LB      Forward Head Moderate  -LB      Rounded Shoulders Bilateral:;Moderate  -LB         General ROM    GENERAL ROM COMMENTS BUE WFL  -LB         MMT (Manual Muscle Testing)    General MMT Comments BUE WFL  -LB         Lymphedema Edema Assessment    Edema Assessment Comment B breast edema, flattening of implants  -LB         L-Dex Bioimpedence Screening    L-Dex Measurement Extremity RUE  -LB      L-Dex Patient Position Standing  -LB      L-Dex UE Dominate Side Right  -LB      L-Dex UE At Risk Side Right  -LB      L-Dex UE Pre Surgical Value No  -LB      L-Dex UE  Score -1.8  -LB      L-Dex UE Baseline Score 3.9  -LB      L-Dex UE Value Change -5.7  -LB      L-Dex UE Comment WNL; stable  -LB      $ L-Dex Charge yes  -LB                User Key  (r) = Recorded By, (t) = Taken By, (c) = Cosigned By      Initials Name Provider Type    Jennifer Mosquera, PT Physical Therapist                                    Therapy Education  Education Details: discussed compression bra, self MLD, bioimpedance results and interpretation, use of compression sleeve  Given: Symptoms/condition management, Edema management  Program: Reinforced  How Provided: Verbal, Demonstration  Provided to: Patient  Level of Understanding: Verbalized  47136 - PT Self Care/Mgmt Minutes: 15       OP Exercises       Row Name 04/09/25 1500             Subjective    Subjective Comments I am doing ok. I think my implants are leaking.  -LB         Subjective Pain    Able to rate subjective pain? yes  -LB      Pre-Treatment Pain Level 0  -LB      Post-Treatment Pain Level 0  -LB                User Key  (r) = Recorded By, (t) = Taken By, (c) = Cosigned By      Initials Name Provider Type    Jennifer Mosquera, PT Physical Therapist                                 PT OP Goals       Row Name 04/09/25 1500          Long Term Goals    LTG Date to Achieve 07/13/23  -LB     LTG 1 Patient will be independent in comprehensive HEP to allow ongoing self management.  -LB     LTG 1 Progress Met  -LB     LTG 2 Patient will acquire appropriately fitted compression garment and verbalize appropriate wear schedule.  -LB     LTG 2 Progress Met  -LB     LTG 3 Patient will demonstrate full painfree AROM B UE.  -LB     LTG 3 Progress Met  -LB     LTG 4 Patient will maintain LDex bioimpedance WNL.  -LB     LTG 4 Progress Ongoing  -LB     LTG 4 Progress Comments WNL and stable at -1.8  -LB     LTG 5 Patient will improve QuickDash score to 20% or less.  -LB     LTG 5 Progress Met  -LB               User Key  (r) = Recorded By, (t) = Taken By, (c)  = Cosigned By      Initials Name Provider Type    Jennifer Mosquera, PT Physical Therapist                     PT Assessment/Plan       Row Name 04/09/25 1513          PT Assessment    Functional Limitations Other (comment)  at risk for lymphedema  -LB     Impairments Edema;Impaired lymphatic circulation;Impaired postural alignment;Posture  -LB     Assessment Comments Pt returns for re-evaluation with B lateral breast edema L > R. We discussed compression bra, self MLD, and return to plastic surgeon. She has acquired new compression sleeve and it fits well. We repeated LDex bioimpedance testing today with stable score WNL at -1.8. Pt has met 4/5 goals. She remains appropriate for continued skilled PT services to address deficits as needed and continue to monitor bioimpedance.  -LB     Rehab Potential Good  -LB     Patient/caregiver participated in establishment of treatment plan and goals Yes  -LB     Patient would benefit from skilled therapy intervention Yes  -LB        PT Plan    PT Frequency Other (comment)  -LB     Predicted Duration of Therapy Intervention (PT) repeat bioimpedance in 12 months  -LB     Planned CPT's? PT EVAL LOW COMPLEXITY: 14450;PT RE-EVAL: 57700;PT THER PROC EA 15 MIN: 57733;PT THER ACT EA 15 MIN: 81134;PT MANUAL THERAPY EA 15 MIN: 04998;PT NEUROMUSC RE-EDUCATION EA 15 MIN: 57682;PT SELF CARE/HOME MGMT/TRAIN EA 15: 82966;PT BIS XTRACELL FLUID ANALYSIS: 42828  -LB     PT Plan Comments repeat bioimpedance in 12 months and consider d/c  -LB               User Key  (r) = Recorded By, (t) = Taken By, (c) = Cosigned By      Initials Name Provider Type    Jennifer Mosquera PT Physical Therapist                                 Time Calculation:   Start Time: 1445  Stop Time: 1515  Time Calculation (min): 30 min  Total Timed Code Minutes- PT: 15 minute(s)  Timed Charges  63308 - PT Self Care/Mgmt Minutes: 15  Total Minutes  Timed Charges Total Minutes: 15   Total Minutes: 15   Therapy Charges for  Today       Code Description Service Date Service Provider Modifiers Qty    14495547519 HC PT BIS XTRACELL FLUID ANALYSIS 4/9/2025 Jennifer Toro, PT  1    10839155711 HC PT SELF CARE/MGMT/TRAIN EA 15 MIN 4/9/2025 Jennifer Toro, PT GP 1                      Jennifer Toro, PT  4/9/2025

## 2025-04-16 NOTE — PROGRESS NOTES
Patient was given instructions and counseling regarding her condition or for health maintenance advice. Please see specific information pulled into the AVS if appropriate.     Mode of Visit: Video  Location of patient: home  Location of provider: [facility]  You have chosen to receive care through a telehealth visit.      Does the patient consent to use a video/audio connection for your medical care today? Yes  The visit included audio and video interaction. No technical issues occurred during this visit.       No chief complaint on file.    2025, interval history:  This was completed as video visit.  We reviewed results of imaging.  She had to reschedule her EGD and colonoscopy, initially scheduled for 2025 due to being diagnosed with pneumonia and bronchitis.  Currently doing okay.  No new concerns or complaints        Oncologic history  Patient is a 47-year-old female who has had problems with itching on the right breast for quite some time.  She had bilateral diagnostic mammogram with ultrasound by seen on ultrasound.    Patient has a family history of breast cancer in 1 sister who was 52 and  of breast cancer at 54 with liver metastasis.  A second sister had breast cancer at age 30 and is currently alive at age 50.  She required chemotherapy.  Her maternal aunt's daughter had breast cancer and another maternal aunt had brain tumor in her 50s.  On the paternal side there is leukemia in a cousin at age 50.  Patient sisters have not been tested but patient tells me that her testing was done.  [facility] genetic test last week, results are pending.  Patient states that she had BRCA testing done in the past by Dr. Adin kenyon in  because of her 2 sisters being diagnosed with breast cancer and her BRCA testing was negative but we do not have the actual report.    Details are as follows    10/12/2022 bilateral diagnostic mammogram with ultrasound:  Finding 1: There is no radiographic abnormality in  the region of the pain in the right breast at 9:00.  There are no suspicious masses, calcifications, or areas of architectural distortion.  Finding 2: There is no radiographic abnormality in the region of the pain in the left breast at 6:00.  No solid or suspicious masses are seen.  Remainder of report pending.    October 12, 2022: Bilateral breast ultrasound showed  1.  Simple cyst with circumscribed margins 4 mm at 9 o'clock position which correlates with pain in the right breast  2.  There is no sonographic correlate for the area of pain in the left breast at 6:00.  No solid or suspicious masses seen  3.  There is a oval parallel complicated cyst versus solid mass with circumscribed margins 8 x 5 x 6 mm at the 10:30 position in the right breast 6 cm from the nipple.     11/10/2022 right breast ultrasound-guided biopsy:  The mass in the right breast at 1030 was located.  5 cores were obtained.  A mini cork tissue marker was placed.  Clip was in satisfactory position.  Pathology returned as atypical ductal hyperplasia which is concordant.     11/10/2022 pathology:  Right breast, 1030:  Atypical ductal hyperplasia     12/27/2022 Bilateral Breast MRI   IMPRESSION:  High risk screening breast MR demonstrating no MR findings of malignancy.   BIRADS: DIAGNOSTIC CATEGORY 1--NEGATIVE.       2/9/2023 Right breast wire localized excisional biopsy  Final Diagnosis   1. Right Breast, Oriented Needle Localization Lumpectomy (11 grams): LOW GRADE DUCTAL CARCINOMA IN SITU (DCIS).  A. Tumor site: 10:30 o'clock.               B. Extent of DCIS: DCIS is multifocal, contiguously spanning approximately 25 mm from medial to lateral.               C. Architecture patterns: Solid and micropapillary.               D. Nuclear grade: Low.               E. Margins: DCIS focally extends to the anterior (3.0 mm focus) and inferior (0.4 mm) margins, is present 1.5 mm       from the superior margin (2.0 mm focus), 2.3 mm from the posterior  margin and 5 mm from medial and lateral       margins of excision.  F. No lymph nodes submitted.  G. Hormone receptor status: ER moderately positive, CT strongly positive, Ki67 ~10% (see biomarker template).   H. Pathologic stage: pTis, NX.       3/29/2023 Bilateral breast skin sparing mastectomy with right sentinel lymph node biopsy   Final Diagnosis   1. Breast, Left, Mastectomy: Breast tissue with  A. Atypical ductal hyperplasia.  B. [place] ductal hyperplasia of the usual type.  C. Simple cyst.  D. Secretory change.  E. Duct ectasia.  F. Clustered cysts.     2. Lymph Node, Right [place] Node #1, Excision (2 mm slices):               A. Single benign lymph node.     3. Breast, Right, Mastectomy: Ductal carcinoma in situ.               A. The ductal carcinoma in situ is of low to intermediate nuclear grade with solid and cribriform architecture and        focal necrosis.   B. The ductal carcinoma in situ measures up to 40 mm  (4 consecutive 1 cm slices).  C. The ductal carcinoma in situ comes within 2.5 mm of the posterior margin, 10 mm of the medial margin,       16 mm of the anterior margin, 75 mm of the inferior margin, 85 mm of the superior and 130 mm of the lateral         margin.  D. Microcalcifications are not associated with the ductal carcinoma in situ.  E. Calcifications are present with non-neoplastic tissue.  F. Foci of atypical ductal hyperplasia  G. Lumpectomy site changes are present with the ductal carcinoma in situ surrounding the areas of the        lumpectomy site.     Comment: The case has been previously staged [id number] that will not be repeated as only residual ductal carcinoma in situ is identified. It was originally staged as Pathologic stage pTis, NX. Given the additional lymph nodes it is pTis, pN0. Hormone receptors were performed on prior tissue and documented in case [id number] which are ER moderate positivity, CT strongly positive, Ki-67 10%. The ER was 81-90% positive. The CT was  % positive.     4. Lymph Node, Right [place] Node #2, Excision (2 mm slices):               A. Single benign lymph node.       Patient is healing from surgery and referred here for evaluation following bilateral mastectomy with reconstruction             Current Outpatient Medications on File Prior to Visit   Medication Sig Dispense Refill    ALBUTEROL SULFATE HFA IN Inhale Every 4 (Four) Hours As Needed.      bisoprolol (ZEBeta) 5 MG tablet Take 1 tablet by mouth Every Night. 90 tablet 3    butalbital-acetaminophen-caffeine (FIORICET, ESGIC) -40 MG per tablet Take 1 tablet by mouth Every 4 (Four) Hours As Needed for Headache.      cyclobenzaprine (FLEXERIL) 10 MG tablet Take 2 tablets by mouth Daily.      diazePAM (VALIUM) 2 MG tablet Take 1 tablet by mouth 3 (Three) Times a Day.      diclofenac (VOLTAREN) 50 MG EC tablet Take 1 tablet by mouth 2 (Two) Times a Day. HOLD PER MD INSTRUCTIONS PRIOR TO SURGERY      Diclofenac Sodium (VOLTAREN) 1 % gel gel Apply 4 g topically to the appropriate area as directed Daily.      dilTIAZem SR (CARDIZEM SR) 60 MG 12 hr capsule Take 1 tablet by mouth every day and can take 1 extra tablet daily as needed for palpitations 180 capsule 3    Erenumab-aooe (AIMOVIG, 140 MG DOSE, SC) Inject 1 dose under the skin into the appropriate area as directed Every 30 (Thirty) Days.      fludrocortisone 0.1 MG tablet Take 2 tablets by mouth Daily. 180 tablet 2    fluticasone (FLONASE) 50 MCG/ACT nasal spray Administer 2 sprays into the nostril(s) as directed by provider Every Night.      ketorolac (TORADOL) 10 MG tablet Take 1 tablet by mouth 2 (Two) Times a Day As Needed. for pain      Loratadine 10 MG capsule Take 1 capsule by mouth Daily.      metoclopramide (REGLAN) 10 MG tablet Take 1 tablet by mouth 2 (Two) Times a Day Before Meals.      NON FORMULARY Epidural-Bupivicane-lidocaine injections every 3 months      NON FORMULARY 125 mg Daily As Needed. Sodium tablets       omeprazole (priLOSEC) 40 MG capsule Take 1 capsule by mouth Daily.      OnabotulinumtoxinA (BOTOX IJ) Inject  as directed Every 3 (Three) Months.      Spiriva Respimat 1.25 MCG/ACT aerosol solution inhaler Inhale 2 puffs Daily.      topiramate (TOPAMAX) 50 MG tablet Take 1.5 tablets by mouth 2 (Two) Times a Day.      traZODone (DESYREL) 50 MG tablet Take 1 tablet by mouth Every Night.       No current facility-administered medications on file prior to visit.        I have reviewed Past Medical, Surgical History, Social History, Meds and Allergies.     REVIEW OF SYSTEMS:  See interval History      Objective     There were no vitals filed for this visit.            12/17/2024     1:11 PM   Current Status   ECOG score 0       Physical Exam  Not done as this was video visit        RECENT LABS:  Hematology WBC   Date Value Ref Range Status   12/17/2024 6.45 3.40 - 10.80 10*3/mm3 Final   02/25/2021 3.81 3.70 - 11.00 k/uL Final   08/31/2018 26 to 100 0 - 5 /hpf Final     Comment:     Culture indicated, results to follow.     RBC   Date Value Ref Range Status   12/17/2024 4.62 3.77 - 5.28 10*6/mm3 Final   02/25/2021 4.15 3.90 - 5.20 m/uL Final     Hemoglobin   Date Value Ref Range Status   12/17/2024 14.6 12.0 - 15.9 g/dL Final   02/25/2021 12.1 11.5 - 15.5 g/dL Final     Hematocrit   Date Value Ref Range Status   12/17/2024 44.0 34.0 - 46.6 % Final   02/25/2021 38.0 36.0 - 46.0 % Final     Platelets   Date Value Ref Range Status   12/17/2024 247 140 - 450 10*3/mm3 Final   02/25/2021 255 150 - 400 k/uL Final        Imaging  MRI Breast Bilateral Screening With & Without Contrast (12/06/2024 13:40)   CT Chest With Contrast Diagnostic (11/26/2024 12:35)  CT Abdomen Pelvis With Contrast (11/26/2024 12:35)    Assessment & Plan     *pTis N0 M0, stage 0, right breast ductal carcinoma in situ, ER/OR positive   *Status post bilateral mastectomy  initially had symptoms of itching in the right breast  Bilateral diagnostic mammogram  and ultrasound on October 2022 showed that the bilateral diagnostic mammogram was negative but the ultrasound showed a lesion at 10:30 position which could be cyst versus solid mass  Ultrasound-guided biopsy of the mass showed atypical ductal hyperplasia  February 9, 2022: Right breast excisional biopsy showed ductal carcinoma in situ, multifocal  S/p bilateral mastectomy with sentinel lymph nodes on the right sentinel lymph node surgery showed left breast was showing atypical ductal hyperplasia but right breast showed evidence of ductal carcinoma in situ.  40 mm ER/ID positive s/p bilateral men mastectomy and reconstruction  Patient is s/p bilateral mastectomy for DCIS and does not require any endocrine therapy  December 2024: Patient established care with me.  I have reviewed the results of MRI breast ordered by plastic surgeon for evaluation of her pruritus. MRI breast is unremarkable.  Examination unremarkable as well    *Pulmonary nodules  *Right hilar lymphadenopathy  *Abnormal CT scan of the colon  July 31, 2024 CT chest abdomen pelvis-nonspecific scattered pulmonary nodules measuring up to 0.4 cm.  Follow-up CT chest in 3 to 6 months recommended.  Nonspecific prominent right hilar lymph node.  Short segment ascending colon narrowing, incompletely assessed.  Underlying lesion cannot be excluded  August 2, 2024-Dr. Anderson reviewed results of the CT scan, provided a referral to Dr. Olvin Mendez to follow-up on lung nodules; and Dr. Marian Vera at [facility] gastroenterology for colonoscopy consideration  November 26, 2024 CT chest abdomen pelvis-new 6 mm right upper lobe nodule and a new small groundglass opacity in left upper lobe.  Infectious versus inflammatory.  Follow-up CT in 3 to 6 months to ensure clearing recommended.  Stable additional micronodules.  12/17/2024: Reviewed results of scan with the patient.  Will obtain repeat scans in 3 months  4/15/2025 CT chest (comparison with CT chest from  April 15, 2022)-trace bilateral pleural effusion with bibasilar atelectasis.  Likely reactive mediastinal and bilateral hilar adenopathy.  Stable right middle lobe pulmonary nodule measuring 4 mm.  Hepatic steatosis.    *Family history of breast cancer  Patient's sister had renal cell at age 50 and  of renal cell cancer at age 52 with liver metastasis. Patient had a second sister who had breast cancer at age 30 and required chemotherapy and still alive at 50. Maternal cousin had brain cancer and paternal cousin had leukemia around 50  [facility] genetic test negative for 9 gene panel    Plan  CT chest was reviewed with the patient.  Of note the comparison study was from 2022 as opposed to her most recent CT from 2024  She had to postpone EGD and colonoscopy which was initially scheduled for 2025 due to pneumonia and bronchitis.  Discussed continued follow-up with her primary care regarding pulmonary nodules.  We will see her on an as-needed basis        Annamaria Medellin MD

## 2025-04-21 ENCOUNTER — TELEMEDICINE (OUTPATIENT)
Dept: ONCOLOGY | Facility: CLINIC | Age: 50
End: 2025-04-21
Payer: OTHER GOVERNMENT

## 2025-04-21 DIAGNOSIS — D05.10 DUCTAL CARCINOMA IN SITU (DCIS) OF BREAST, UNSPECIFIED LATERALITY: ICD-10-CM

## 2025-04-21 DIAGNOSIS — R91.1 LUNG NODULE SEEN ON IMAGING STUDY: Primary | ICD-10-CM

## 2025-04-21 PROCEDURE — 99213 OFFICE O/P EST LOW 20 MIN: CPT | Performed by: STUDENT IN AN ORGANIZED HEALTH CARE EDUCATION/TRAINING PROGRAM

## 2025-05-12 ENCOUNTER — OFFICE VISIT (OUTPATIENT)
Dept: CARDIOLOGY | Facility: CLINIC | Age: 50
End: 2025-05-12
Payer: OTHER GOVERNMENT

## 2025-05-12 VITALS
BODY MASS INDEX: 36.26 KG/M2 | SYSTOLIC BLOOD PRESSURE: 122 MMHG | HEART RATE: 68 BPM | HEIGHT: 64 IN | DIASTOLIC BLOOD PRESSURE: 80 MMHG | WEIGHT: 212.4 LBS

## 2025-05-12 DIAGNOSIS — I49.5 TACHY-BRADY SYNDROME: ICD-10-CM

## 2025-05-12 DIAGNOSIS — G90.A POTS (POSTURAL ORTHOSTATIC TACHYCARDIA SYNDROME): Primary | ICD-10-CM

## 2025-05-12 DIAGNOSIS — R00.0 TACHYCARDIA: ICD-10-CM

## 2025-05-12 DIAGNOSIS — R00.2 PALPITATIONS: ICD-10-CM

## 2025-05-12 DIAGNOSIS — R42 DIZZINESS: ICD-10-CM

## 2025-05-12 PROCEDURE — 99214 OFFICE O/P EST MOD 30 MIN: CPT | Performed by: NURSE PRACTITIONER

## 2025-05-12 RX ORDER — BISOPROLOL FUMARATE 5 MG/1
5 TABLET, FILM COATED ORAL NIGHTLY
Qty: 90 TABLET | Refills: 3 | Status: SHIPPED | OUTPATIENT
Start: 2025-05-12

## 2025-05-12 RX ORDER — DILTIAZEM HYDROCHLORIDE 60 MG/1
CAPSULE, EXTENDED RELEASE ORAL
Qty: 180 CAPSULE | Refills: 3 | Status: SHIPPED | OUTPATIENT
Start: 2025-05-12

## 2025-05-12 NOTE — PROGRESS NOTES
Chief Complaint   Patient presents with    Follow-up     Cardiac management    Lab     Last labs in chart.    Near syncope      Having more episodes of dizziness, nausea and near syncopal episodes since last visit. Reports heart will start racing and she becomes hot prior to episodes. She reports BP has been stable, was sick with pneumonia and bronchitis the first of year.    Med Refill     Will need refills on cardiac medications-90 day        HPI:  HPI   Dolores Dejesus is a 49 y.o. female with anxiety, POTS, palpitations, migraines, and syncope. She underwent multiple investigations including echocardiogram, Holter monitor, stress test, and a Tilt table.  She had normal LV systolic function, mild valvular regurgitation and no ischemia.  The tilt table was positive for hypotension and syncopal episode. Symptoms persisted and MCOT ordered. Results showed an average heart rate of 82 with no arrythmia and several instances of sinus tachycardia for which patient was symptomatic.  She was advised to add metoprolol prn but became symptomatic with medication. It was then changed to CCB (diltiazem 30mg)  to be used prn if palpitations should occur. Carotid US was negative.  In April 2020 she saw Dr. Martínez.  He recommended referral to autonomic dysfunction clinic, specifically  at [facility]. She was seen at [facility] in October and underwent Extensive workup was done including normal echocardiogram, 26 day cardiac telemetry in which symptoms correlated with sinus tach, and unremarkable carotid ultrasound. Neuro cardio autonomic test panel reported no evidence of orthostatic tachycardia or orthostatic tachycardia or hypotension.  Workup 2022 EF 70%, no ischemic burden.  Remains followed by EP for autonomic dysfunction. Florinef and BB have been continued. She reports right breast cancer with bilateral mastectomy and reconstruction, followed by oncology.      Turns today for follow-up visit. She has been  having more POTS episodes of dizziness and presyncope. Patient denies chest pain, pressure, palpitations, tightness, shortness of air.  Labs with PCP.  She states she has been having more episodes of dizziness, nausea, near syncope which are related to POTS.  We discussed symptom management.    Cardiac History:    Past Surgical History:   Procedure Laterality Date    ABLATION OF DYSRHYTHMIC FOCUS  2021    [facility]    BREAST AUGMENTATION Bilateral 2023    BREAST BIOPSY  Oct 2022    Need surgery right    BREAST LUMPECTOMY Right 2023    Procedure: Right breast wire localized excisional biopsy;  Surgeon: Dasia Marroquin MD;  Location: [facility];  Service: General;  Laterality: Right;    BREAST RECONSTRUCTION Bilateral 2023    Procedure: BILATERAL IMMEDIATE BREAST  RECONSTRUCTION  WITH TISSUE EXPANDERS AND BIOLOGIC;  Surgeon: Georgia Baer MD;  Location: [facility];  Service: Plastics;  Laterality: Bilateral;    BREAST SURGERY  23    Found cancer    CARDIAC CATHETERIZATION  2021    CARDIOVASCULAR STRESS TEST  2018    R.Stress- 6 Min. 46 secs. 7.0 METS. 76% THR. BP- 121/73. Pt had CP. Inconclusive test    CARDIOVASCULAR STRESS TEST  10/17/2018    L. Cardiolite- Negative.    CARDIOVASCULAR STRESS TEST  2022    Lexiscan- EF > 70%. Negative    CARDIOVASCULAR STRESS TEST  2024    Lexiscan-. EF > 70%. Breast attenuation     SECTION      X 3    CHOLECYSTECTOMY      CHOLECYSTECTOMY  2023    CONVERTED (HISTORICAL) HOLTER  2017    @[facility].AVG HR 98 BPM    CONVERTED (HISTORICAL) HOLTER  2021    <8 Days. .. 1 run of SVT    CONVERTED (HISTORICAL) HOLTER  2021    Baseline sinus tach, AVG , current advised, follow with EP    ECHO - CONVERTED  2017    @[facility]. EF 60%. Mild AI    ECHO - CONVERTED  2017    EF 60%. MIld- Mod AI. No Shunt    ECHO - CONVERTED  2018    EF 55%. Mild AI & MR. RVSP- 26 mmHg.    ECHO -  CONVERTED  08/03/2020    @ [facility].  60%. Mild MR & AI. No AS. RVSP- 26 mmHg    FOOT SURGERY Right     HAND SURGERY Right     KNEE ACL RECONSTRUCTION Left     LAPAROSCOPIC TUBAL LIGATION      MASTECTOMY W/ SENTINEL NODE BIOPSY Right 03/29/2023    Procedure: bilateral skin sparing mastectomy, right sentinel lymph node biopsy;  Surgeon: Dasia Marroquin MD;  Location: [facility];  Service: General;  Laterality: Right;    OTHER SURGICAL HISTORY  07/10/2017    Extended Monitor- AVG HR 81 BPM. [place] an Syncope with no EKG changes    OTHER SURGICAL HISTORY  09/18/2018    Tilt Table- Positive.    OTHER SURGICAL HISTORY  05/09/2019    MCOT:  High , low 51, AVG 82, sinus noted, rare PVC, no arrythymia    RIGHT OOPHORECTOMY      STAPEDECTOMY      US CAROTID UNILATERAL  04/09/2019    Jensen:  No flow limiting stenosis bilaterally       Current Outpatient Medications   Medication Sig Dispense Refill    ALBUTEROL SULFATE HFA IN Inhale Every 4 (Four) Hours As Needed.      bisoprolol (ZEBeta) 5 MG tablet Take 1 tablet by mouth Every Night. 90 tablet 3    butalbital-acetaminophen-caffeine (FIORICET, ESGIC) -40 MG per tablet Take 1 tablet by mouth Every 4 (Four) Hours As Needed for Headache.      cyclobenzaprine (FLEXERIL) 10 MG tablet Take 2 tablets by mouth Daily.      diazePAM (VALIUM) 2 MG tablet Take 1 tablet by mouth 3 (Three) Times a Day.      diclofenac (VOLTAREN) 50 MG EC tablet Take 1 tablet by mouth 2 (Two) Times a Day. HOLD PER MD INSTRUCTIONS PRIOR TO SURGERY      Diclofenac Sodium (VOLTAREN) 1 % gel gel Apply 4 g topically to the appropriate area as directed Daily.      dilTIAZem SR (CARDIZEM SR) 60 MG 12 hr capsule Take 1 tablet by mouth every day and can take 1 extra tablet daily as needed for palpitations 180 capsule 3    Erenumab-aooe (AIMOVIG, 140 MG DOSE, SC) Inject 1 dose under the skin into the appropriate area as directed Every 30 (Thirty) Days.      fludrocortisone 0.1 MG tablet Take 2 tablets by  mouth Daily. 180 tablet 2    fluticasone (FLONASE) 50 MCG/ACT nasal spray Administer 2 sprays into the nostril(s) as directed by provider Every Night.      Loratadine 10 MG capsule Take 1 capsule by mouth Daily.      metoclopramide (REGLAN) 10 MG tablet Take 1 tablet by mouth 2 (Two) Times a Day Before Meals.      NON FORMULARY Epidural-Bupivicane-lidocaine injections every 3 months      NON FORMULARY 125 mg Daily As Needed. Sodium tablets      omeprazole (priLOSEC) 40 MG capsule Take 1 capsule by mouth Daily.      OnabotulinumtoxinA (BOTOX IJ) Inject  as directed Every 3 (Three) Months.      Spiriva Respimat 1.25 MCG/ACT aerosol solution inhaler Inhale 2 puffs Daily.      topiramate (TOPAMAX) 50 MG tablet Take 1.5 tablets by mouth 2 (Two) Times a Day.      traZODone (DESYREL) 50 MG tablet Take 1 tablet by mouth Every Night.       No current facility-administered medications for this visit.       Other, Carbamates, Carisoprodol, and Triptans    Past Medical History:   Diagnosis Date    Abnormal ECG May 2016    Anxiety     Asthma     Breast atypical hyperplasia     Breast cancer 1/2023    Cholelithiasis 8/2022    Pain on my right side    Chronic kidney disease     hx of kidney stones    Coronary artery disease May 2026    POTS    DDD (degenerative disc disease), lumbar     Depression     Ductal carcinoma in situ (DCIS) of right breast 02/28/2023    GERD (gastroesophageal reflux disease)     History of ear surgery     Hx of hysterectomy 06/12/2018    Hypertension     Hypoglycemia     Migraines     Monoclonal (M) protein disease, 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    PONV (postoperative nausea and vomiting) 2006    Torn acl repair work up from surgery very nauseous    POTS (postural orthostatic tachycardia syndrome)     Rectal bleeding 10/2021    Due to constipation    S/P ACL surgery     S/P removal of ovarian cyst     Tattoos     Thyroid nodule        Social History     Socioeconomic History    Marital status:       "Spouse name: Darien    Number of children: 5   Tobacco Use    Smoking status: Never     Passive exposure: Never    Smokeless tobacco: Never   Vaping Use    Vaping status: Never Used   Substance and Sexual Activity    Alcohol use: Not Currently     Comment: Maybe once a month if we go out to dinner    Drug use: No    Sexual activity: Yes     Partners: Male     Birth control/protection: None, Tubal ligation, Hysterectomy     Comment: 2018 Hysterectomy was done leaving one overy left       Family History   Problem Relation Age of Onset    Diabetes Mother     Arthritis Father     Asthma Father         Covid related Asthma    Breast cancer Sister     Cancer Sister         Liver 2020 passed 2022    Early death Sister         Liver cancer  2022    Hearing loss Sister     No Known Problems Sister     Cancer Sister         Breast right     Miscarriages / Stillbirths Daughter         Miscarriage/ stillbirth    Heart disease Son         Caused by Kawasaki disease    Heart attack Son     Autism Son     Learning disabilities Son         Autism    Miscarriages / Stillbirths Son         Misscarriage/ stillbirth    Heart attack Paternal Grandfather         Heart Attack    Heart disease Paternal Grandfather         Heart failure    Heart attack Paternal Uncle         Heart Attack has a defibrillator    Heart disease Paternal Uncle     Malig Hyperthermia Neg Hx        Vitals:   /80 (BP Location: Left arm, Patient Position: Sitting)   Pulse 68   Ht 162.6 cm (64.02\")   Wt 96.3 kg (212 lb 6.4 oz)   BMI 36.44 kg/m²     Physical Exam  Vitals and nursing note reviewed.   Constitutional:       Appearance: She is morbidly obese.   Neck:      Vascular: No carotid bruit.   Cardiovascular:      Rate and Rhythm: Normal rate and regular rhythm.      Pulses: Normal pulses.      Heart sounds: Normal heart sounds. No murmur heard.     No friction rub. No gallop.   Pulmonary:      Effort: Pulmonary effort is " normal.      Breath sounds: Normal breath sounds and air entry.   Musculoskeletal:      Right lower leg: No edema.      Left lower leg: No edema.   Skin:     General: Skin is warm and dry.      Capillary Refill: Capillary refill takes less than 2 seconds.   Neurological:      Mental Status: She is alert and oriented to person, place, and time.       Procedures     Assessment & Plan     Diagnoses and all orders for this visit:    1. POTS (postural orthostatic tachycardia syndrome) (Primary)    2. Tachycardia  -     bisoprolol (ZEBeta) 5 MG tablet; Take 1 tablet by mouth Every Night.  Dispense: 90 tablet; Refill: 3  -     dilTIAZem SR (CARDIZEM SR) 60 MG 12 hr capsule; Take 1 tablet by mouth every day and can take 1 extra tablet daily as needed for palpitations  Dispense: 180 capsule; Refill: 3    3. Palpitations  -     bisoprolol (ZEBeta) 5 MG tablet; Take 1 tablet by mouth Every Night.  Dispense: 90 tablet; Refill: 3  -     dilTIAZem SR (CARDIZEM SR) 60 MG 12 hr capsule; Take 1 tablet by mouth every day and can take 1 extra tablet daily as needed for palpitations  Dispense: 180 capsule; Refill: 3    4. Tachy-semaj syndrome  -     bisoprolol (ZEBeta) 5 MG tablet; Take 1 tablet by mouth Every Night.  Dispense: 90 tablet; Refill: 3  -     dilTIAZem SR (CARDIZEM SR) 60 MG 12 hr capsule; Take 1 tablet by mouth every day and can take 1 extra tablet daily as needed for palpitations  Dispense: 180 capsule; Refill: 3    5. Dizziness    POTS/dizziness/palpitations  - Encourage adequate hydration, compression socks  - Continue beta-blocker, Florinef, diltiazem  - Continue salt tablets as needed  - Follows with EP    Stable from a cardiac standpoint. No further testing recommended at this time. No medication changes made today.  Refills sent    Visit Diagnoses:    ICD-10-CM ICD-9-CM   1. POTS (postural orthostatic tachycardia syndrome)  G90.A 427.89   2. Tachycardia  R00.0 785.0   3. Palpitations  R00.2 785.1   4. Tachy-semaj  syndrome  I49.5 427.81   5. Dizziness  R42 780.4       Meds Ordered During Visit:  New Medications Ordered This Visit   Medications    bisoprolol (ZEBeta) 5 MG tablet     Sig: Take 1 tablet by mouth Every Night.     Dispense:  90 tablet     Refill:  3    dilTIAZem SR (CARDIZEM SR) 60 MG 12 hr capsule     Sig: Take 1 tablet by mouth every day and can take 1 extra tablet daily as needed for palpitations     Dispense:  180 capsule     Refill:  3       Follow Up Appointment:   Return in about 6 months (around 11/12/2025), or if symptoms worsen or fail to improve.           This document has been electronically signed by GIDEON Patel  May 12, 2025 16:33 EDT    Dictated Utilizing Dragon Dictation: Part of this note may be an electronic transcription/translation of spoken language to printed text using the Dragon Dictation System.

## 2025-05-30 ENCOUNTER — TELEPHONE (OUTPATIENT)
Dept: SURGERY | Facility: CLINIC | Age: 50
End: 2025-05-30
Payer: OTHER GOVERNMENT

## 2025-05-30 NOTE — TELEPHONE ENCOUNTER
VOICEMAIL LEFT FOR PATIENT TO MOVE HER 1 YR F/U APPT WITH DR HORTA FROM 1:00 PM TO 11:30 AM. REQUESTED PATIENT RETURN CALL TO CONFIRM.

## (undated) DEVICE — TRAP FLD MINIVAC MEGADYNE 100ML

## (undated) DEVICE — PROXIMATE RH ROTATING HEAD SKIN STAPLERS (35 WIDE) CONTAINS 35 STAINLESS STEEL STAPLES: Brand: PROXIMATE

## (undated) DEVICE — GLV SURG BIOGEL LTX PF 6 1/2

## (undated) DEVICE — JACKSON-PRATT 100CC BULB RESERVOIR: Brand: CARDINAL HEALTH

## (undated) DEVICE — STRIP,CLOSURE,WOUND,MEDI-STRIP,1/2X4: Brand: MEDLINE

## (undated) DEVICE — LOU MINOR PROCEDURE: Brand: MEDLINE INDUSTRIES, INC.

## (undated) DEVICE — DRSNG SURESITE WNDW 4X4.5

## (undated) DEVICE — SPNG LAP 18X18IN LF STRL PK/5

## (undated) DEVICE — Device

## (undated) DEVICE — SYR LUERLOK 5CC

## (undated) DEVICE — CONTAINER,SPECIMEN,OR STERILE,4OZ: Brand: MEDLINE

## (undated) DEVICE — BNDG ELAS ELITE V/CLOSE 6IN 5YD LF STRL

## (undated) DEVICE — SUT PDS 3/0 SH 27IN DYED Z316H

## (undated) DEVICE — PAD,ABDOMINAL,8"X10",ST,LF: Brand: MEDLINE

## (undated) DEVICE — STCKNT IMPERV 9X36IN STRL

## (undated) DEVICE — SUT MNCRYL PLS ANTIB UD 4/0 PS2 18IN

## (undated) DEVICE — CVR TRANSD CIV FLX TPR 11.9 TO 3.8X61CM

## (undated) DEVICE — GLV SURG BIOGEL M LTX PF 6 1/2

## (undated) DEVICE — SUT MNCRYL 3/0 PS2 18IN MCP497G

## (undated) DEVICE — NDL HYPO ECLPS SFTY 22G 1 1/2IN

## (undated) DEVICE — STPLR SKIN VISISTAT WD 35CT

## (undated) DEVICE — EXTRCT STPL SKIN STRL BX/12

## (undated) DEVICE — INTENDED FOR TISSUE SEPARATION, AND OTHER PROCEDURES THAT REQUIRE A SHARP SURGICAL BLADE TO PUNCTURE OR CUT.: Brand: BARD-PARKER ® STAINLESS STEEL BLADES

## (undated) DEVICE — LEGGINGS, PAIR, 31X48, STERILE: Brand: MEDLINE

## (undated) DEVICE — SUT ETHLN 4/0 PS2 PLSTC 1667G

## (undated) DEVICE — ELECTRD BLD EZ CLN MOD 2.5IN

## (undated) DEVICE — BIOPATCH™ ANTIMICROBIAL DRESSING WITH CHLORHEXIDINE GLUCONATE IS A HYDROPHILLIC POLYURETHANE ABSORPTIVE FOAM WITH CHLORHEXIDINE GLUCONATE (CHG) WHICH INHIBITS BACTERIAL GROWTH UNDER THE DRESSING. THE DRESSING IS INTENDED TO BE USED TO ABSORB EXUDATE, COVER A WOUND CAUSED BY VASCULAR AND NONVASCULAR PERCUTANEOUS MEDICAL DEVICES DURING SURGERY, AS WELL AS REDUCE LOCAL INFECTION AND COLONIZATION OF MICROORGANISMS.: Brand: BIOPATCH

## (undated) DEVICE — ADHS SKIN SURG TISS VISC PREMIERPRO EXOFIN HI/VISC FAST/DRY

## (undated) DEVICE — SUT SILK 2/0 SH 30IN K833H

## (undated) DEVICE — PATIENT RETURN ELECTRODE, SINGLE-USE, CONTACT QUALITY MONITORING, ADULT, WITH 9FT CORD, FOR PATIENTS WEIGING OVER 33LBS. (15KG): Brand: MEGADYNE

## (undated) DEVICE — SUT VIC 3/0 SH 27IN J416H

## (undated) DEVICE — PK CHST BRST 40

## (undated) DEVICE — NDL HYPO PRECISIONGLIDE REG 25G 1 1/2

## (undated) DEVICE — APPL CHLORAPREP HI/LITE 26ML ORNG

## (undated) DEVICE — TOTAL TRAY, 16FR 10ML SIL FOLEY, URN: Brand: MEDLINE

## (undated) DEVICE — SYRINGE, LUER LOCK, 60ML: Brand: MEDLINE

## (undated) DEVICE — TBG PENCL TELESCP MEGADYNE SMOKE EVAC 10FT

## (undated) DEVICE — NDL HYPO PRECISIONGLIDE/REG 18G 11/2 PNK

## (undated) DEVICE — SUT SILK 2/0 FS BLK 18IN 685G

## (undated) DEVICE — PK UNIV COMPL 40